# Patient Record
Sex: MALE | Race: BLACK OR AFRICAN AMERICAN | NOT HISPANIC OR LATINO | Employment: OTHER | URBAN - METROPOLITAN AREA
[De-identification: names, ages, dates, MRNs, and addresses within clinical notes are randomized per-mention and may not be internally consistent; named-entity substitution may affect disease eponyms.]

---

## 2017-01-06 ENCOUNTER — CARE COORDINATION (OUTPATIENT)
Dept: GERIATRIC MEDICINE | Facility: CLINIC | Age: 72
End: 2017-01-06

## 2017-01-06 NOTE — PROGRESS NOTES
TM left requesting a call back.  Patricia Isbell RN Case Manager  [facility]  [phone]

## 2017-01-09 ENCOUNTER — CARE COORDINATION (OUTPATIENT)
Dept: GERIATRIC MEDICINE | Facility: CLINIC | Age: 72
End: 2017-01-09

## 2017-01-09 NOTE — PROGRESS NOTES
[facility] Six-Month Telephone Assessment    6 month telephone assessment completed on January 9, 2017.    ER visits: No  Hospitalizations: No  TCU stays: No  Significant health status changes: none  Falls/Injuries: No  ADL/IADL changes: No  Changes in services: No    Caregiver Assessment follow up:  NA    Goals: See POC in chart for goal progress documentation.  Client stated that he got a couple teeth pulled, but he is doing well. No needs at this time.    Will see client in 6 months for an annual health risk assessment.   Encouraged client to call CM with any questions or concerns in the meantime.     Patricia Isbell RN Case Manager  [facility]  [phone]

## 2017-03-31 ENCOUNTER — TELEPHONE (OUTPATIENT)
Dept: FAMILY MEDICINE | Facility: CLINIC | Age: 72
End: 2017-03-31

## 2017-03-31 ENCOUNTER — OFFICE VISIT (OUTPATIENT)
Dept: FAMILY MEDICINE | Facility: CLINIC | Age: 72
End: 2017-03-31
Payer: COMMERCIAL

## 2017-03-31 DIAGNOSIS — Z13.6 SCREENING FOR AAA (ABDOMINAL AORTIC ANEURYSM): ICD-10-CM

## 2017-03-31 DIAGNOSIS — Z13.89 SCREENING FOR DIABETIC PERIPHERAL NEUROPATHY: ICD-10-CM

## 2017-03-31 DIAGNOSIS — Z00.01 ENCOUNTER FOR GENERAL ADULT MEDICAL EXAMINATION WITH ABNORMAL FINDINGS: Primary | ICD-10-CM

## 2017-03-31 DIAGNOSIS — K05.10 GINGIVITIS: ICD-10-CM

## 2017-03-31 DIAGNOSIS — E78.5 HYPERLIPIDEMIA WITH TARGET LDL LESS THAN 100: ICD-10-CM

## 2017-03-31 DIAGNOSIS — I10 ESSENTIAL HYPERTENSION WITH GOAL BLOOD PRESSURE LESS THAN 140/90: ICD-10-CM

## 2017-03-31 DIAGNOSIS — E11.65 TYPE 2 DIABETES MELLITUS WITH HYPERGLYCEMIA, WITHOUT LONG-TERM CURRENT USE OF INSULIN (H): ICD-10-CM

## 2017-03-31 DIAGNOSIS — E11.9 TYPE 2 DIABETES MELLITUS WITHOUT COMPLICATION, WITHOUT LONG-TERM CURRENT USE OF INSULIN (H): ICD-10-CM

## 2017-03-31 DIAGNOSIS — I10 HYPERTENSION GOAL BP (BLOOD PRESSURE) < 140/90: ICD-10-CM

## 2017-03-31 DIAGNOSIS — Z12.5 SCREENING FOR PROSTATE CANCER: ICD-10-CM

## 2017-03-31 DIAGNOSIS — Z87.891 HISTORY OF SMOKING: ICD-10-CM

## 2017-03-31 DIAGNOSIS — Z12.11 SCREEN FOR COLON CANCER: ICD-10-CM

## 2017-03-31 DIAGNOSIS — N40.1 BENIGN NON-NODULAR PROSTATIC HYPERPLASIA WITH LOWER URINARY TRACT SYMPTOMS: ICD-10-CM

## 2017-03-31 DIAGNOSIS — N32.81 OVERACTIVE BLADDER: ICD-10-CM

## 2017-03-31 DIAGNOSIS — Z23 NEED FOR PROPHYLACTIC VACCINATION WITH TETANUS-DIPHTHERIA (TD): ICD-10-CM

## 2017-03-31 LAB — HBA1C MFR BLD: 13.4 % (ref 4.3–6)

## 2017-03-31 PROCEDURE — 80061 LIPID PANEL: CPT | Performed by: FAMILY MEDICINE

## 2017-03-31 PROCEDURE — 84450 TRANSFERASE (AST) (SGOT): CPT | Performed by: FAMILY MEDICINE

## 2017-03-31 PROCEDURE — 99213 OFFICE O/P EST LOW 20 MIN: CPT | Mod: 25 | Performed by: FAMILY MEDICINE

## 2017-03-31 PROCEDURE — 36415 COLL VENOUS BLD VENIPUNCTURE: CPT | Performed by: FAMILY MEDICINE

## 2017-03-31 PROCEDURE — G0103 PSA SCREENING: HCPCS | Performed by: FAMILY MEDICINE

## 2017-03-31 PROCEDURE — 83036 HEMOGLOBIN GLYCOSYLATED A1C: CPT | Performed by: FAMILY MEDICINE

## 2017-03-31 PROCEDURE — 99397 PER PM REEVAL EST PAT 65+ YR: CPT | Performed by: FAMILY MEDICINE

## 2017-03-31 PROCEDURE — 84460 ALANINE AMINO (ALT) (SGPT): CPT | Performed by: FAMILY MEDICINE

## 2017-03-31 PROCEDURE — 99207 C FOOT EXAM  NO CHARGE: CPT | Mod: 25 | Performed by: FAMILY MEDICINE

## 2017-03-31 PROCEDURE — 80048 BASIC METABOLIC PNL TOTAL CA: CPT | Performed by: FAMILY MEDICINE

## 2017-03-31 RX ORDER — DOXYCYCLINE HYCLATE 100 MG
100 TABLET ORAL 2 TIMES DAILY
Qty: 20 TABLET | Refills: 0 | Status: SHIPPED | OUTPATIENT
Start: 2017-03-31 | End: 2017-04-07

## 2017-03-31 RX ORDER — PIOGLITAZONEHYDROCHLORIDE 30 MG/1
30 TABLET ORAL DAILY
Qty: 90 TABLET | Refills: 3 | Status: SHIPPED | OUTPATIENT
Start: 2017-03-31 | End: 2017-08-22

## 2017-03-31 NOTE — LETTER
[facility]   [address] [place]   [address]  [phone]    April 3, 2017      David Gutierrez .  [address] [place] [address]              Dear Mr. Gutierrez,    The blood sugar was EXTREMELY high and in a very dangerous range for heart attacks, stroke and kidney failure.      I hope the new medication (Actos) is going well.      I'll have my team call you to schedule follow up in 3-4 weeks since it doesn't look like you scheduled that at our appointment.     Results for orders placed or performed in visit on 03/31/17   BASIC METABOLIC PANEL   Result Value Ref Range    Sodium 138 133 - 144 mmol/L    Potassium 3.8 3.4 - 5.3 mmol/L    Chloride 100 94 - 109 mmol/L    Carbon Dioxide 26 20 - 32 mmol/L    Anion Gap 12 3 - 14 mmol/L    Glucose 274 (H) 70 - 99 mg/dL    Urea Nitrogen 25 7 - 30 mg/dL    Creatinine 1.17 0.66 - 1.25 mg/dL    GFR Estimate 62 >60 mL/min/1.7m2    GFR Estimate If Black 75 >60 mL/min/1.7m2    Calcium 9.3 8.5 - 10.1 mg/dL   HEMOGLOBIN A1C   Result Value Ref Range    Hemoglobin A1C 13.4 (H) 4.3 - 6.0 %   Lipid panel reflex to direct LDL   Result Value Ref Range    Cholesterol 144 <200 mg/dL    Triglycerides 115 <150 mg/dL    HDL Cholesterol 34 (L) >39 mg/dL    LDL Cholesterol Calculated 87 <100 mg/dL    Non HDL Cholesterol 110 <130 mg/dL   ALT   Result Value Ref Range    ALT 20 0 - 70 U/L   AST   Result Value Ref Range    AST 7 0 - 45 U/L   Prostate spec antigen screen   Result Value Ref Range    PSA 3.88 0 - 4 ug/L           Sincerely,    Joel Wegener, MD/nr

## 2017-03-31 NOTE — PATIENT INSTRUCTIONS
ASSESSMENT AND PLAN  1. Encounter for general adult medical examination with abnormal findings  Recommend:  tdap today, aortic ultrasound screening, lab monitoring for diabetes/ckd, fit test for colon cancer screening.     Generally not interested in increasing metformin or other diabetes medications although a1c generally high.      Up to date on pneumonia vaccines.     2. Hypertension goal BP (blood pressure) < 140/90  Re-check today.   - BASIC METABOLIC PANEL    3. Type 2 diabetes mellitus with hyperglycemia, without long-term current use of insulin (H)    - HEMOGLOBIN A1C    4. Hyperlipidemia with target LDL less than 100    - Lipid panel reflex to direct LDL  - ALT  - AST    5. Screen for colon cancer    - Fecal colorectal cancer screen FIT - Future (S+30); Future    6. Screening for diabetic peripheral neuropathy    - FOOT EXAM  NO CHARGE [08953.204]    7. Need for prophylactic vaccination with tetanus-diphtheria (TD)    - TDAP VACCINE (BOOSTRIX)    8. Screening for AAA (abdominal aortic aneurysm)    - AORTIC CENTER NOTIFICATION  - US Abdominal Aorta Limited; Future    9. History of smoking    - US Abdominal Aorta Limited; Future        MYCHART SIGNUP FOR E-VISITS AND EASIER COMMUNICATION:  [email or web address]     Zipnosis:  [employer].  Sign up for e-visits for common illnesses!     RADIOLOGY:   [facility]:  [phone] to schedule any radiology tests at [facility]: [phone]    Mammograms/colonoscopies:  [phone]    CONSUMER PRICE LINE for estimates of test costs:  [phone]       Preventive Health Recommendations:       Male Ages 65 and over    Yearly exam:             See your health care provider every year in order to  o   Review health changes.   o   Discuss preventive care.    o   Review your medicines if your doctor has prescribed any.    Talk with your health care provider about whether you should have a test to screen for prostate cancer  (PSA).    Every 3 years, have a diabetes test (fasting glucose). If you are at risk for diabetes, you should have this test more often.    Every 5 years, have a cholesterol test. Have this test more often if you are at risk for high cholesterol or heart disease.     Every 10 years, have a colonoscopy. Or, have a yearly FIT test (stool test). These exams will check for colon cancer.    Talk to with your health care provider about screening for Abdominal Aortic Aneurysm if you have a family history of AAA or have a history of smoking.  Shots:     Get a flu shot each year.     Get a tetanus shot every 10 years.     Talk to your doctor about your pneumonia vaccines. There are now two you should receive - Pneumovax (PPSV 23) and Prevnar (PCV 13).    Talk to your doctor about a shingles vaccine.     Talk to your doctor about the hepatitis B vaccine.  Nutrition:     Eat at least 5 servings of fruits and vegetables each day.     Eat whole-grain bread, whole-wheat pasta and brown rice instead of white grains and rice.     Talk to your doctor about Calcium and Vitamin D.   Lifestyle    Exercise for at least 150 minutes a week (30 minutes a day, 5 days a week). This will help you control your weight and prevent disease.     Limit alcohol to one drink per day.     No smoking.     Wear sunscreen to prevent skin cancer.     See your dentist every six months for an exam and cleaning.     See your eye doctor every 1 to 2 years to screen for conditions such as glaucoma, macular degeneration and cataracts.

## 2017-03-31 NOTE — NURSING NOTE
Chief Complaint   Patient presents with     Physical       Initial /70 (BP Location: Left arm, Patient Position: Chair, Cuff Size: Adult Regular)  Pulse 107  Temp 98.8  F (37.1  C) (Oral)  Ht 6' (1.829 m)  Wt 196 lb 8 oz (89.1 kg)  SpO2 96%  BMI 26.65 kg/m2 Estimated body mass index is 26.65 kg/(m^2) as calculated from the following:    Height as of this encounter: 6' (1.829 m).    Weight as of this encounter: 196 lb 8 oz (89.1 kg).  Medication Reconciliation: complete Jassi Winston MA

## 2017-03-31 NOTE — PROGRESS NOTES
"  SUBJECTIVE:                                                            David Gutierrez Jr. is a 71 year old male who presents for Preventive Visit.      Are you in the first 12 months of your Medicare Part B coverage?  No    Healthy Habits:Answers for HPI/ROS submitted by the patient on 3/31/2017   Annual Exam:  Getting at least 3 servings of Calcium per day:: NO  Bi-annual eye exam:: Yes  Dental care twice a year:: Yes  Sleep apnea or symptoms of sleep apnea:: None  Diet:: Regular (no restrictions)  Taking medications regularly:: Not Applicable  Medication side effects:: None  Additional concerns today:: YES  Q1: Little interest or pleasure in doing things: 0=Not at all  Q2: Feeling down, depressed or hopeless: 0=Not at all  PHQ-2 Score: 0    COGNITIVE SCREEN  1) Repeat 3 items (Banana, Sunrise, Chair)      2) Clock draw: ABNORMAL   3) 3 item recall: Recalls 1 object   Results: ABNORMAL clock, 1-2 items recalled: PROBABLE COGNITIVE IMPAIRMENT, **INFORM PROVIDER**    Mini-CogTM Copyright GAYLE Aleman. Licensed by the author for use in [facility]; reprinted with permission ([email or web address]). All rights reserved.          Pt want medication refills    Blood sugars reported at about 300.  Checks daily.  Thinks that is \"better than used to be.\"  When confronted with years of high blood sugars states would like to use a daily \"supplement\" he has seen on TV to get them down.  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  Tries to keep carbs low but doesn't always.  Endorses taking metformin 500mg twice daily but afraid of taking higher due to hypoglycemia.  Med list reviewed and endorses taking the blood pressue meds, stating and ace inhibitor.  Does remember that he is due for a colonoscopy on his own and asks for the scheduling number for this.     No vision loss, no numbness. " Does endorse seeing and eye doctor yearly at least.     Reviewed and updated as needed this visit by clinical staff  Tobacco  Allergies  Meds  Med Hx  Surg Hx  Fam Hx  Soc Hx        Reviewed and updated as needed this visit by Provider  Tobacco        Social History   Substance Use Topics     Smoking status: Former Smoker     Quit date: 1/1/1992     Smokeless tobacco: Never Used      Comment: social     Alcohol use Yes      Comment: rare       The patient does not drink >3 drinks per day nor >7 drinks per week.    Today's PHQ-2 Score:   PHQ-2 ( 1999 Pfizer) 3/31/2017 7/26/2016   Q1: Little interest or pleasure in doing things 0 0   Q2: Feeling down, depressed or hopeless 0 0   PHQ-2 Score 0 0   Little interest or pleasure in doing things Not at all -   Feeling down, depressed or hopeless Not at all -   PHQ-2 Score 0 -       Do you feel safe in your environment - Yes    Do you have a Health Care Directive?: No: Advance care planning reviewed with patient; information given to patient to review.             Current providers sharing in care for this patient include:   Patient Care Team:  Wegener, Joel Daniel Irwin, MD as PCP - General (Family Practice)  Patricia Isbell, RN as   Sita Hernandez as Other (see comments)  Edel Durand as Other (see comments)      Hearing impairment: No    Ability to successfully perform activities of daily living: Yes, no assistance needed     Fall risk:  Fallen 2 or more times in the past year?: Yes  Any fall with injury in the past year?: No    Home safety:  none identified  click delete button to remove this line now    The following health maintenance items are reviewed in Epic and correct as of today:  Health Maintenance   Topic Date Due     COLONOSCOPY Q10 YEARS NO INBASKET MESSAGE  06/05/1955     EYE EXAM Q1 YEAR( NO INBASKET)  06/12/2016     PNEUMOCOCCAL (2 of 2 - PPSV23) 11/10/2016     TETANUS IMMUNIZATION (SYSTEM ASSIGNED)  01/09/2017     ADVANCE  DIRECTIVE PLANNING Q5 YRS (NO INBASKET)  03/23/2017     A1C Q3 MO( NO INBASKET)  06/30/2017     MICROALBUMIN Q1 YEAR( NO INBASKET)  07/29/2017     FIT Q1 YR (NO INBASKET)  07/29/2017     INFLUENZA VACCINE (SYSTEM ASSIGNED)  09/01/2017     BMP Q6 MOS (NO INBASKET)  09/30/2017     LIPID MONITORING Q6 MO( NO INBASKET)  09/30/2017     FOOT EXAM Q1 YEAR( NO INBASKET)  03/31/2018     FALL RISK ASSESSMENT  03/31/2018     TSH W/ FREE T4 REFLEX Q2 YEAR (NO INBASKET)  07/29/2018     AORTIC ANEURYSM SCREENING (SYSTEM ASSIGNED)  Completed     HEPATITIS C SCREENING  Completed           ROS:  Constitutional, HEENT, cardiovascular, pulmonary, GI, , musculoskeletal, neuro, skin, endocrine and psych systems are negative, except as otherwise noted.    Problem list, Medication list, Allergies, and Medical/Social/Surgical histories reviewed in [facility] and updated as appropriate.  OBJECTIVE:                                                            /74  Pulse 107  Temp 98.8  F (37.1  C) (Oral)  Ht 6' (1.829 m)  Wt 196 lb 8 oz (89.1 kg)  SpO2 96%  BMI 26.65 kg/m2 Estimated body mass index is 26.65 kg/(m^2) as calculated from the following:    Height as of this encounter: 6' (1.829 m).    Weight as of this encounter: 196 lb 8 oz (89.1 kg).  EXAM:   GENERAL: healthy, alert and no distress  EYES: Eyes grossly normal to inspection, PERRL and conjunctivae and sclerae normal  HENT: ear canals and TM's normal, nose and mouth without ulcers or lesions  NECK: no adenopathy, no asymmetry, masses, or scars and thyroid normal to palpation  RESP: lungs clear to auscultation - no rales, rhonchi or wheezes  CV: regular rate and rhythm, normal S1 S2, no S3 or S4, no murmur, click or rub, no peripheral edema and peripheral pulses strong  ABDOMEN: soft, nontender, no hepatosplenomegaly, no masses and bowel sounds normal   (male): normal male genitalia without lesions or urethral discharge, no hernia  RECTAL: normal sphincter tone, no rectal  "masses, prostate fairly enlarged but no nodules or masses  MS: no gross musculoskeletal defects noted, no edema  SKIN: no suspicious lesions or rashes  NEURO: Normal strength and tone, mentation intact and speech normal  PSYCH: mentation appears normal, affect normal/bright    Bilateral diabetic monofilament foot exam normal     ASSESSMENT / PLAN:                                                            ASSESSMENT AND PLAN  1. Encounter for general adult medical examination with abnormal findings  Recommend:  tdap today, aortic ultrasound screening, lab monitoring for diabetes/ckd, fit test for colon cancer screening.     Generally not interested in increasing metformin or other diabetes medications although a1c generally high.      Long discussion - more than 1/2 of 25 additional minutes) spent discussing the imminent risks of his prolonged hyperglycemia including heart attack, stroke, neuropathy, kidney failure, dka.  He is not very convinced to change medications or to increase the metformin dose especially since that would be the maximum dose.  Interested in a daily \"supplement\" medication  Given that we discussed adding actos 30mg daily.  Discussed some increased heart risk with this med inherently but overall lower risk if this reduces blood sugar.  I think this would be a good addition to the metformin as it is often covered since it is generic now and is only once a day.  Of all diabetes medications it also has some possibility of being \"disease  Modifying\" meaning unlike others it could have the potential to get him to goal even with just this addition.  Started 30mg after reviewing dosing range of 15-30mg.  He is ok with this since it is not the \"highest\" dose.      I have not seen formal diagnosis in his chart but given his perseverative and slightly odd history about his family and his preoccupation with supplements, personal and family achievements I suspect he has a form of asperger's syndrome or " similar. Adjusting therapy may take a long time and patience for insight.     Up to date on pneumonia vaccines.     2. Hypertension goal BP (blood pressure) < 140/90  Re-check today.   - BASIC METABOLIC PANEL    3. Type 2 diabetes mellitus with hyperglycemia, without long-term current use of insulin (H)    - HEMOGLOBIN A1C    4. Hyperlipidemia with target LDL less than 100    - Lipid panel reflex to direct LDL  - ALT  - AST    5. Screen for colon cancer    - Fecal colorectal cancer screen FIT - Future (S+30); Future    6. Screening for diabetic peripheral neuropathy    - FOOT EXAM  NO CHARGE [34521.114]    7. Need for prophylactic vaccination with tetanus-diphtheria (TD)    - TDAP VACCINE (BOOSTRIX)    8. Screening for AAA (abdominal aortic aneurysm)    - AORTIC CENTER NOTIFICATION  -  Abdominal Aorta Limited; Future    9. History of smoking    -  Abdominal Aorta Limited; Future          End of Life Planning:  Patient currently has an advanced directive: No.  I have verified the patient's ablity to prepare an advanced directive/make health care decisions.  Literature was provided to assist patient in preparing an advanced directive.    COUNSELING:  Reviewed preventive health counseling, as reflected in patient instructions       Regular exercise       Healthy diet/nutrition       Colon cancer screening       Prostate cancer screening        Estimated body mass index is 26.65 kg/(m^2) as calculated from the following:    Height as of this encounter: 6' (1.829 m).    Weight as of this encounter: 196 lb 8 oz (89.1 kg).  Weight management plan: Discussed healthy diet and exercise guidelines and patient will follow up in 1 month in clinic to re-evaluate.   reports that he quit smoking about 25 years ago. He has never used smokeless tobacco.      Appropriate preventive services were discussed with this patient, including applicable screening as appropriate for cardiovascular disease, diabetes, osteopenia/osteoporosis,  and glaucoma.  As appropriate for age/gender, discussed screening for colorectal cancer, prostate cancer, breast cancer, and cervical cancer. Checklist reviewing preventive services available has been given to the patient.    Reviewed patients plan of care and provided an AVS. The Intermediate Care Plan ( asthma action plan, low back pain action plan, and migraine action plan) for David meets the Care Plan requirement. This Care Plan has been established and reviewed with the Patient.    Counseling Resources:  ATP IV Guidelines  Pooled Cohorts Equation Calculator  Breast Cancer Risk Calculator  FRAX Risk Assessment  ICSI Preventive Guidelines  Dietary Guidelines for Americans, 2010  USDA's MyPlate  ASA Prophylaxis  Lung CA Screening    Joel Daniel Wegener, MD  [facility]

## 2017-03-31 NOTE — MR AVS SNAPSHOT
After Visit Summary   3/31/2017    David Gutierrez Jr.    MRN: [id number]           Patient Information     Date Of Birth          1945        Visit Information        Provider Department      3/31/2017 11:20 AM Wegener, Joel Daniel Irwin, MD [facility]        Today's Diagnoses     Encounter for general adult medical examination with abnormal findings    -  1    Hypertension goal BP (blood pressure) < 140/90        Type 2 diabetes mellitus with hyperglycemia, without long-term current use of insulin (H)        Hyperlipidemia with target LDL less than 100        Screen for colon cancer        Screening for diabetic peripheral neuropathy        Need for prophylactic vaccination with tetanus-diphtheria (TD)        Screening for AAA (abdominal aortic aneurysm)        History of smoking          Care Instructions    ASSESSMENT AND PLAN  1. Encounter for general adult medical examination with abnormal findings  Recommend:  tdap today, aortic ultrasound screening, lab monitoring for diabetes/ckd, fit test for colon cancer screening.     Generally not interested in increasing metformin or other diabetes medications although a1c generally high.      Up to date on pneumonia vaccines.     2. Hypertension goal BP (blood pressure) < 140/90  Re-check today.   - BASIC METABOLIC PANEL    3. Type 2 diabetes mellitus with hyperglycemia, without long-term current use of insulin (H)    - HEMOGLOBIN A1C    4. Hyperlipidemia with target LDL less than 100    - Lipid panel reflex to direct LDL  - ALT  - AST    5. Screen for colon cancer    - Fecal colorectal cancer screen FIT - Future (S+30); Future    6. Screening for diabetic peripheral neuropathy    - FOOT EXAM  NO CHARGE [90702.114]    7. Need for prophylactic vaccination with tetanus-diphtheria (TD)    - TDAP VACCINE (BOOSTRIX)    8. Screening for AAA (abdominal aortic aneurysm)    - AORTIC CENTER NOTIFICATION  -  Abdominal Aorta Limited;  Future    9. History of smoking    - US Abdominal Aorta Limited; Future        MYCHART SIGNUP FOR E-VISITS AND EASIER COMMUNICATION:  [email or web address]     Zipnosis:  [email or web address].  Sign up for e-visits for common illnesses!     RADIOLOGY:   [facility]:  [phone] to schedule any radiology tests at [facility]: [phone]    Mammograms/colonoscopies:  [phone]    CONSUMER PRICE LINE for estimates of test costs:  [phone]       Preventive Health Recommendations:       Male Ages 65 and over    Yearly exam:             See your health care provider every year in order to  o   Review health changes.   o   Discuss preventive care.    o   Review your medicines if your doctor has prescribed any.    Talk with your health care provider about whether you should have a test to screen for prostate cancer (PSA).    Every 3 years, have a diabetes test (fasting glucose). If you are at risk for diabetes, you should have this test more often.    Every 5 years, have a cholesterol test. Have this test more often if you are at risk for high cholesterol or heart disease.     Every 10 years, have a colonoscopy. Or, have a yearly FIT test (stool test). These exams will check for colon cancer.    Talk to with your health care provider about screening for Abdominal Aortic Aneurysm if you have a family history of AAA or have a history of smoking.  Shots:     Get a flu shot each year.     Get a tetanus shot every 10 years.     Talk to your doctor about your pneumonia vaccines. There are now two you should receive - Pneumovax (PPSV 23) and Prevnar (PCV 13).    Talk to your doctor about a shingles vaccine.     Talk to your doctor about the hepatitis B vaccine.  Nutrition:     Eat at least 5 servings of fruits and vegetables each day.     Eat whole-grain bread, whole-wheat pasta and brown rice instead of white grains and rice.     Talk to your doctor about Calcium and Vitamin D.  "  Lifestyle    Exercise for at least 150 minutes a week (30 minutes a day, 5 days a week). This will help you control your weight and prevent disease.     Limit alcohol to one drink per day.     No smoking.     Wear sunscreen to prevent skin cancer.     See your dentist every six months for an exam and cleaning.     See your eye doctor every 1 to 2 years to screen for conditions such as glaucoma, macular degeneration and cataracts.        Follow-ups after your visit        Future tests that were ordered for you today     Open Future Orders        Priority Expected Expires Ordered    Fecal colorectal cancer screen FIT - Future (S+30) Routine 4/21/2017 4/30/2017 3/31/2017     Abdominal Aorta Limited Routine  3/31/2018 3/31/2017            Who to contact     If you have questions or need follow up information about today's clinic visit or your schedule please contact [facility] directly at [phone].  Normal or non-critical lab and imaging results will be communicated to you by Citycelebrityhart, letter or phone within 4 business days after the clinic has received the results. If you do not hear from us within 7 days, please contact the clinic through Citycelebrityhart or phone. If you have a critical or abnormal lab result, we will notify you by phone as soon as possible.  Submit refill requests through [email or web address] or call your pharmacy and they will forward the refill request to us. Please allow 3 business days for your refill to be completed.          Additional Information About Your Visit        [employer] Information     [email or web address] lets you send messages to your doctor, view your test results, renew your prescriptions, schedule appointments and more. To sign up, go to [email or web address] . Click on \"Log in\" on the left side of the screen, which will take you to the Welcome page. Then click on \"Sign up Now\" on the right side of the page.     You will be asked to enter the access code listed below, as well as some " personal information. Please follow the directions to create your username and password.     Your access code is: [email or web address]  Expires: 2017 12:32 PM     Your access code will  in 90 days. If you need help or a new code, please call your [place] clinic or [phone].        Care EveryWhere ID     This is your Care EveryWhere ID. This could be used by other organizations to access your [place] medical records  [id number]        Your Vitals Were     Pulse Temperature Height Pulse Oximetry BMI (Body Mass Index)       107 98.8  F (37.1  C) (Oral) 6' (1.829 m) 96% 26.65 kg/m2        Blood Pressure from Last 3 Encounters:   17 140/70   16 124/72   11/10/15 118/68    Weight from Last 3 Encounters:   17 196 lb 8 oz (89.1 kg)   16 188 lb (85.3 kg)   11/10/15 196 lb (88.9 kg)              We Performed the Following     ALT     AORTIC CENTER NOTIFICATION     AST     BASIC METABOLIC PANEL     FOOT EXAM  NO CHARGE [73402.114]     HEMOGLOBIN A1C     Lipid panel reflex to direct LDL     TDAP VACCINE (BOOSTRIX)        Primary Care Provider Office Phone # Fax #    Joel Daniel Irwin Wegener, [phone]       [facility] [address] [place] [id number]        Thank you!     Thank you for choosing [facility]  for your care. Our goal is always to provide you with excellent care. Hearing back from our patients is one way we can continue to improve our services. Please take a few minutes to complete the written survey that you may receive in the mail after your visit with us. Thank you!             Your Updated Medication List - Protect others around you: Learn how to safely use, store and throw away your medicines at www.disposemymeds.org.          This list is accurate as of: 3/31/17 12:32 PM.  Always use your most recent med list.                   Brand Name Dispense Instructions for use    aspirin 81 MG tablet     100 tablet    Take 1 tablet (81 mg)  by mouth daily       blood glucose monitor Kit     1 kit    1 each daily.       blood glucose monitoring test strip    no brand specified    3 Box    Use twice daily before meals and as needed for symptoms of low blood sugar.       DAILY MULTIVITAMIN PO      Take 1 tablet by mouth.       DOXYCYCLINE PO      Take 20 mg by mouth       GARLIC      1 po qd       lisinopril 20 MG tablet    PRINIVIL/ZESTRIL    90 tablet    Take 1 tablet (20 mg) by mouth daily       metFORMIN 500 MG tablet    GLUCOPHAGE    180 tablet    Take 1 tablet (500 mg) by mouth 2 times daily (with meals)       Milk Thistle 500 MG Caps      Take  by mouth every 48 hours.       oxybutynin 5 MG 24 hr tablet    DITROPAN XL    90 tablet    Take 1 tablet (5 mg) by mouth daily       sildenafil 50 MG cap/tab    REVATIO/VIAGRA    12 tablet    Take 0.5-1 tablets (25-50 mg) by mouth daily as needed for erectile dysfunction Take 30 min to 4 hours before intercourse.  Never use with nitroglycerin, terazosin or doxazosin.       simvastatin 20 MG tablet    ZOCOR    90 tablet    Take 1 tablet (20 mg) by mouth At Bedtime       tamsulosin 0.4 MG capsule    FLOMAX    90 capsule    Take 1 capsule (0.4 mg) by mouth daily       thin lancets    NO BRAND SPECIFIED    180 each    1 Device 2 times daily (before meals)       triamterene-hydrochlorothiazide 37.5-25 MG per capsule    DYAZIDE    90 capsule    Take 1 capsule by mouth daily       verapamil 180 MG Cp24 24 hr capsule    VERELAN    90 capsule    Take 1 capsule (180 mg) by mouth daily

## 2017-03-31 NOTE — TELEPHONE ENCOUNTER
PT stopped at the desk after appt with pcp.  Pt was wondering about refill on this med-  DOXYCYCLINE PO     --      Sig: Take 20 mg by mouth       This is listed as hx.  RAHEL Pimentel

## 2017-04-01 LAB
ALT SERPL W P-5'-P-CCNC: 20 U/L (ref 0–70)
ANION GAP SERPL CALCULATED.3IONS-SCNC: 12 MMOL/L (ref 3–14)
AST SERPL W P-5'-P-CCNC: 7 U/L (ref 0–45)
BUN SERPL-MCNC: 25 MG/DL (ref 7–30)
CALCIUM SERPL-MCNC: 9.3 MG/DL (ref 8.5–10.1)
CHLORIDE SERPL-SCNC: 100 MMOL/L (ref 94–109)
CHOLEST SERPL-MCNC: 144 MG/DL
CO2 SERPL-SCNC: 26 MMOL/L (ref 20–32)
CREAT SERPL-MCNC: 1.17 MG/DL (ref 0.66–1.25)
GFR SERPL CREATININE-BSD FRML MDRD: 62 ML/MIN/1.7M2
GLUCOSE SERPL-MCNC: 274 MG/DL (ref 70–99)
HDLC SERPL-MCNC: 34 MG/DL
LDLC SERPL CALC-MCNC: 87 MG/DL
NONHDLC SERPL-MCNC: 110 MG/DL
POTASSIUM SERPL-SCNC: 3.8 MMOL/L (ref 3.4–5.3)
PSA SERPL-ACNC: 3.88 UG/L (ref 0–4)
SODIUM SERPL-SCNC: 138 MMOL/L (ref 133–144)
TRIGL SERPL-MCNC: 115 MG/DL

## 2017-04-02 ENCOUNTER — TELEPHONE (OUTPATIENT)
Dept: FAMILY MEDICINE | Facility: CLINIC | Age: 72
End: 2017-04-02

## 2017-04-02 VITALS
HEIGHT: 72 IN | DIASTOLIC BLOOD PRESSURE: 74 MMHG | TEMPERATURE: 98.8 F | HEART RATE: 107 BPM | SYSTOLIC BLOOD PRESSURE: 135 MMHG | WEIGHT: 196.5 LBS | OXYGEN SATURATION: 96 % | BODY MASS INDEX: 26.61 KG/M2

## 2017-04-02 RX ORDER — TAMSULOSIN HYDROCHLORIDE 0.4 MG/1
0.4 CAPSULE ORAL DAILY
Qty: 90 CAPSULE | Refills: 3 | Status: SHIPPED | OUTPATIENT
Start: 2017-04-02 | End: 2017-12-04

## 2017-04-02 RX ORDER — OXYBUTYNIN CHLORIDE 5 MG/1
5 TABLET, EXTENDED RELEASE ORAL DAILY
Qty: 90 TABLET | Refills: 3 | Status: SHIPPED | OUTPATIENT
Start: 2017-04-02 | End: 2018-11-21

## 2017-04-02 RX ORDER — LISINOPRIL 20 MG/1
20 TABLET ORAL DAILY
Qty: 90 TABLET | Refills: 3 | Status: SHIPPED | OUTPATIENT
Start: 2017-04-02 | End: 2018-04-19

## 2017-04-02 RX ORDER — VERAPAMIL HYDROCHLORIDE 180 MG/1
180 CAPSULE, EXTENDED RELEASE ORAL DAILY
Qty: 90 CAPSULE | Refills: 3 | Status: SHIPPED | OUTPATIENT
Start: 2017-04-02 | End: 2018-06-12

## 2017-04-02 RX ORDER — SIMVASTATIN 20 MG
20 TABLET ORAL AT BEDTIME
Qty: 90 TABLET | Refills: 3 | Status: SHIPPED | OUTPATIENT
Start: 2017-04-02 | End: 2018-08-17

## 2017-04-02 RX ORDER — TRIAMTERENE AND HYDROCHLOROTHIAZIDE 37.5; 25 MG/1; MG/1
1 CAPSULE ORAL DAILY
Qty: 90 CAPSULE | Refills: 3 | Status: SHIPPED | OUTPATIENT
Start: 2017-04-02 | End: 2019-04-01

## 2017-04-02 NOTE — TELEPHONE ENCOUNTER
Call pt re:    Mr. Gutierrez,     The blood sugar was EXTREMELY high and in a very dangerous range for heart attacks, stroke and kidney failure.      I hope the new medication (Actos) is going well.      I'll have my team call you to schedule follow up in 3-4 weeks since it doesn't look like you scheduled that at our appointment.     Best,     Joel Wegener,MD

## 2017-04-03 ENCOUNTER — DOCUMENTATION ONLY (OUTPATIENT)
Dept: VASCULAR SURGERY | Facility: CLINIC | Age: 72
End: 2017-04-03

## 2017-04-05 ENCOUNTER — TELEPHONE (OUTPATIENT)
Dept: FAMILY MEDICINE | Facility: CLINIC | Age: 72
End: 2017-04-05

## 2017-04-06 ENCOUNTER — TELEPHONE (OUTPATIENT)
Dept: FAMILY MEDICINE | Facility: CLINIC | Age: 72
End: 2017-04-06

## 2017-04-06 ENCOUNTER — TELEPHONE (OUTPATIENT)
Dept: PHARMACY | Facility: OTHER | Age: 72
End: 2017-04-06

## 2017-04-06 DIAGNOSIS — K05.10 GINGIVITIS: ICD-10-CM

## 2017-04-06 DIAGNOSIS — E11.65 TYPE 2 DIABETES MELLITUS WITH HYPERGLYCEMIA, WITHOUT LONG-TERM CURRENT USE OF INSULIN (H): Primary | ICD-10-CM

## 2017-04-06 NOTE — TELEPHONE ENCOUNTER
LVM requesting patient call clinic back to discuss Dr. Wegener's message below.    Thanks! Nicolle Mccann RN

## 2017-04-06 NOTE — TELEPHONE ENCOUNTER
MTM referral from: [facility]     MTM referral outreach attempt #1 on April 6, 2017 at 12:50 PM      Outcome: Patient is not interested at this time because he feels its not necessary, will route to MTM Pharmacist/Provider as an FYI. Thank you for the referral.     Rose Marie Boo, MTM Coordinator Intern

## 2017-04-06 NOTE — TELEPHONE ENCOUNTER
Team,     Can you ask Mr. Gutierrez if he started the actos yet?      If he didn't I realized he may qualify for the GRADE study at the [facility] for diabetes care.     He would get free medication and diabetes visit with them.     Is this something he would be interested in?     Joel Wegener,MD

## 2017-04-07 RX ORDER — DOXYCYCLINE HYCLATE 20 MG
20 TABLET ORAL DAILY
Qty: 90 TABLET | Refills: 3 | Status: SHIPPED | OUTPATIENT
Start: 2017-04-07 | End: 2017-04-10

## 2017-04-07 NOTE — TELEPHONE ENCOUNTER
I changed prescription for doxycycline (uses chronically for gingivitis.)     Also Margarito gave him info on grade study which he is interested in.     Joel Wegener,MD        You may be eligible for the GRADE study at the [facility]  (Glycemia reduction approaches in Diabetes: A comparative effectiveness study).    The point of the study is to determine what is the best second drug to add to metformin in patients with type 2 diabetes.  Adullts with type 2 for less than 10 years who take metformin or no medication at all.  Study participants would come to the [facility] 4 times a year for seven years and receive FREE medications, lab tests, diabetes supplies.    Call , email [email or web address], or visit ZACK on faceboook at [email or web address]

## 2017-04-07 NOTE — TELEPHONE ENCOUNTER
Talked to pt.  The doxycycline needs to be fixed. The dose is 20 mg daily. Pended. Discussed with JW.        Pt is interested in the GRADE study.    LM for pt.   I told him to stop the actos. Per JW, Ok to stop if he was only on this for a few days.  Left pt the GRADE phone # [phone] to get registered with Diab study at [facility] [address] [place] [address].  Call clinic with any questions.  RAHEL Pimentel

## 2017-04-10 RX ORDER — DOXYCYCLINE HYCLATE 20 MG
20 TABLET ORAL 2 TIMES DAILY
Qty: 180 TABLET | Refills: 3 | Status: SHIPPED | OUTPATIENT
Start: 2017-04-10 | End: 2017-04-10

## 2017-04-10 RX ORDER — DOXYCYCLINE HYCLATE 20 MG
20 TABLET ORAL DAILY
Qty: 90 TABLET | Refills: 0 | Status: SHIPPED | OUTPATIENT
Start: 2017-04-10 | End: 2017-08-04

## 2017-04-10 NOTE — TELEPHONE ENCOUNTER
Reviewed this message with pt.  He will pu his rx for doxycycline.    I wanted to make sure he got the study info.  After further thought , he doesn't want to do the diab GRADE study.  He wants to continue on the 2 meds rx and hopefully this will handle the diabetes, per the pt.  RAHEL Pimentel

## 2017-04-10 NOTE — TELEPHONE ENCOUNTER
Received two messages today regarding doxycycline.  First message I felt was telling me he thought his previous dose had been 20mg twice daily.     In fact as he comes to get prescription filled clarifies it has been once daily for quite a long time.     Since gingivitis was well controlled/low at his visit with me I feel 20mg once daily is reasonable even though this is lower than recommended dosing guidelines.     I sent in new prescription for once daily.     Joel Wegener,MD

## 2017-04-12 PROCEDURE — 82274 ASSAY TEST FOR BLOOD FECAL: CPT | Performed by: FAMILY MEDICINE

## 2017-04-14 DIAGNOSIS — Z12.11 SCREEN FOR COLON CANCER: ICD-10-CM

## 2017-04-14 LAB — HEMOCCULT STL QL IA: NEGATIVE

## 2017-04-14 NOTE — LETTER
"[facility]   [address] [place] [address]  [phone]      April 17, 2017      David Gutierrez Jr.  [address] [place] [address]          Dear Mr. Gutierrez,    The results of your recent lab tests were within normal limits. Enclosed is a copy of these results.  If you have any further questions or problems, please contact our office.    Results for orders placed or performed in visit on 04/14/17   Fecal colorectal cancer screen FIT - Future (S+30)   Result Value Ref Range    Occult Blood Scn FIT Negative NEG         Sincerely,      Rox \"David\" Yovanny PAPrashanthC/nr        "

## 2017-04-14 NOTE — LETTER
[facility]   [address] [place]   [address]  [phone]    April 20, 2017      David Gutierrez Jr.  [address] [place] [address]              Dear Mr. Gutierrez,    Your results were normal.     Results for orders placed or performed in visit on 04/14/17   Fecal colorectal cancer screen FIT - Future (S+30)   Result Value Ref Range    Occult Blood Scn FIT Negative NEG           Sincerely,    Joel Wegener, MD/nr

## 2017-06-19 ENCOUNTER — CARE COORDINATION (OUTPATIENT)
Dept: GERIATRIC MEDICINE | Facility: CLINIC | Age: 72
End: 2017-06-19

## 2017-06-19 NOTE — PROGRESS NOTES
Attempted to contact client through his number and was told it was the wrong number. It is also for one of his emergency contacts. TM left for client's daughter Dana requesting a call back to get client's number.  Patricia Isbell RN Case Manager  [facility]  [phone]

## 2017-06-20 ENCOUNTER — CARE COORDINATION (OUTPATIENT)
Dept: GERIATRIC MEDICINE | Facility: CLINIC | Age: 72
End: 2017-06-20

## 2017-06-20 NOTE — PROGRESS NOTES
TC to client. Discussed a home visit. He stated he would think about it and get back with CM (CM doubts client will agree to a visit as he has always declined in the past and plans to send a refusal of visit to client). Reports he is doing well, no falls, ER visits or hospitalizations.  Patricia Isbell RN Case Manager  [facility]  [phone]

## 2017-06-21 ENCOUNTER — CARE COORDINATION (OUTPATIENT)
Dept: GERIATRIC MEDICINE | Facility: CLINIC | Age: 72
End: 2017-06-21

## 2017-06-21 NOTE — PROGRESS NOTES
"Per CC, mailed client a \"Refusal of Home Visit\" letter.  MMIS entry.    Lindsay Tavarez  Case Management Specialist  [facility]  [phone]          "

## 2017-08-02 DIAGNOSIS — K05.10 GINGIVITIS: ICD-10-CM

## 2017-08-03 RX ORDER — DOXYCYCLINE HYCLATE 100 MG
100 TABLET ORAL 2 TIMES DAILY
Qty: 30 TABLET | Refills: 0 | Status: SHIPPED | OUTPATIENT
Start: 2017-08-03 | End: 2017-08-04

## 2017-08-03 NOTE — TELEPHONE ENCOUNTER
Call pt.  High levels of no shows, last 4 visits no shows.     Needs to schedule with me for diabetes follow up.     Per current [facility] policy the clinic is preparing to dismiss him unless I provide a plan with him for improved reliability.     He has been a long-standing patient so I want to help with this but he does need to see me and keep the appointment within the next month.     Joel Wegener,MD

## 2017-08-03 NOTE — TELEPHONE ENCOUNTER
Team coordinators-Please contact patient, review message per Dr. Wegener and assist patient to schedule office visit.    Thank you!  ROSE MARIE MayorgaN, RN

## 2017-08-03 NOTE — TELEPHONE ENCOUNTER
Routing refill request to provider for review/approval because:  -Overdue for gingivitis and diabetes follow up.  -four no shows since last office visit on 3/31/17      Dr. Wegener-Please sign if agree. 15 day supply pended.    Team coordinators-please contact patient to schedule diabetes and gingivitis follow up visit.    Thank you!  ROSE MARIE MayorgaN, RN

## 2017-08-03 NOTE — TELEPHONE ENCOUNTER
doxycycline (PERIOSTAT) 20 MG tablet      Last Written Prescription Date: 4/10/17  Last Fill Quantity: 90,  # refills: 0   Last Office Visit with [facility], [facility] or [facility] prescribing provider: 7/21/17

## 2017-08-04 ENCOUNTER — TELEPHONE (OUTPATIENT)
Dept: FAMILY MEDICINE | Facility: CLINIC | Age: 72
End: 2017-08-04

## 2017-08-04 DIAGNOSIS — K05.10 GINGIVITIS: ICD-10-CM

## 2017-08-04 RX ORDER — DOXYCYCLINE HYCLATE 20 MG
20 TABLET ORAL DAILY
Qty: 30 TABLET | Refills: 0 | Status: SHIPPED | OUTPATIENT
Start: 2017-08-04 | End: 2023-07-14

## 2017-08-04 NOTE — TELEPHONE ENCOUNTER
I called pt and left message re:    No-shows:    1.  Needs to follow up with me for diabetes, etc or he will be released from our clinic and will need to find a new clinic.     2.  Doxycycline:  I will refill one more month of 20mg daily for gingivitis.     He will need to establish care with a dentist for further treatment since we are well beyond the normal time frame of use for this medication and I don't normally treat this condition.     If this is not the correct dose I asked him to please call me back and let me know.     Joel Wegener,MD

## 2017-08-22 ENCOUNTER — OFFICE VISIT (OUTPATIENT)
Dept: FAMILY MEDICINE | Facility: CLINIC | Age: 72
End: 2017-08-22
Payer: COMMERCIAL

## 2017-08-22 VITALS
SYSTOLIC BLOOD PRESSURE: 105 MMHG | WEIGHT: 195 LBS | OXYGEN SATURATION: 97 % | BODY MASS INDEX: 26.45 KG/M2 | HEART RATE: 102 BPM | DIASTOLIC BLOOD PRESSURE: 65 MMHG | TEMPERATURE: 96.6 F

## 2017-08-22 DIAGNOSIS — I10 HYPERTENSION GOAL BP (BLOOD PRESSURE) < 140/90: ICD-10-CM

## 2017-08-22 DIAGNOSIS — K05.10 CHRONIC GINGIVITIS: ICD-10-CM

## 2017-08-22 DIAGNOSIS — Z23 NEED FOR VACCINATION: ICD-10-CM

## 2017-08-22 DIAGNOSIS — L84 CALLUS OF FOOT: ICD-10-CM

## 2017-08-22 DIAGNOSIS — E11.65 TYPE 2 DIABETES MELLITUS WITH HYPERGLYCEMIA, WITHOUT LONG-TERM CURRENT USE OF INSULIN (H): Primary | ICD-10-CM

## 2017-08-22 LAB
CREAT UR-MCNC: 144 MG/DL
HBA1C MFR BLD: 9.2 % (ref 4.3–6)
MICROALBUMIN UR-MCNC: 20 MG/L
MICROALBUMIN/CREAT UR: 13.96 MG/G CR (ref 0–17)

## 2017-08-22 PROCEDURE — 99214 OFFICE O/P EST MOD 30 MIN: CPT | Mod: 25 | Performed by: FAMILY MEDICINE

## 2017-08-22 PROCEDURE — 83036 HEMOGLOBIN GLYCOSYLATED A1C: CPT | Performed by: FAMILY MEDICINE

## 2017-08-22 PROCEDURE — 36415 COLL VENOUS BLD VENIPUNCTURE: CPT | Performed by: FAMILY MEDICINE

## 2017-08-22 PROCEDURE — 90732 PPSV23 VACC 2 YRS+ SUBQ/IM: CPT | Performed by: FAMILY MEDICINE

## 2017-08-22 PROCEDURE — G0009 ADMIN PNEUMOCOCCAL VACCINE: HCPCS | Performed by: FAMILY MEDICINE

## 2017-08-22 PROCEDURE — 82043 UR ALBUMIN QUANTITATIVE: CPT | Performed by: FAMILY MEDICINE

## 2017-08-22 RX ORDER — PIOGLITAZONEHYDROCHLORIDE 45 MG/1
45 TABLET ORAL DAILY
Qty: 90 TABLET | Refills: 3 | Status: SHIPPED | OUTPATIENT
Start: 2017-08-22 | End: 2018-05-15

## 2017-08-22 NOTE — NURSING NOTE
Screening Questionnaire for Adult Immunization    Are you sick today?   No   Do you have allergies to medications, food, a vaccine component or latex?   No   Have you ever had a serious reaction after receiving a vaccination?   No   Do you have a long-term health problem with heart disease, lung disease, asthma, kidney disease, metabolic disease (e.g. diabetes), anemia, or other blood disorder?   No-notify PCP   Do you have cancer, leukemia, HIV/AIDS, or any other immune system problem?   No   In the past 3 months, have you taken medications that affect  your immune system, such as prednisone, other steroids, or anticancer drugs; drugs for the treatment of rheumatoid arthritis, Crohn s disease, or psoriasis; or have you had radiation treatments?   No   Have you had a seizure, or a brain or other nervous system problem?   No   During the past year, have you received a transfusion of blood or blood     products, or been given immune (gamma) globulin or antiviral drug?   No   For women: Are you pregnant or is there a chance you could become        pregnant during the next month?   No   Have you received any vaccinations in the past 4 weeks?   No     Immunization questionnaire answers were all negative.          Per orders of Dr. Wegener , injection of  PVC 23 given by Jassi Winston. Patient instructed to remain in clinic for 15 minutes afterwards, and to report any adverse reaction to me immediately.       Screening performed by Jassi Winston on 8/22/2017 at 10:33 AM.

## 2017-08-22 NOTE — MR AVS SNAPSHOT
After Visit Summary   8/22/2017    David Gutierrez Jr.    MRN: [id number]           Patient Information     Date Of Birth          1945        Visit Information        Provider Department      8/22/2017 10:00 AM Wegener, Joel Daniel Irwin, MD [facility]        Today's Diagnoses     Type 2 diabetes mellitus with hyperglycemia, without long-term current use of insulin (H)    -  1    Need for vaccination        Hypertension goal BP (blood pressure) < 140/90        Chronic gingivitis        Callus of foot          Care Instructions    ASSESSMENT AND PLAN  1. Type 2 diabetes mellitus with hyperglycemia, without long-term current use of insulin (H)  Lab Results   Component Value Date    A1C 9.2 08/22/2017    A1C 13.4 03/31/2017    A1C 10.3 07/29/2016    A1C 11.3 11/10/2015    A1C 13.0 01/28/2015     Uncontrolled but improved.     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  She helps keep track of appointments.     Increase metformin to 1000 mg twice daily and actos to 45 mg twice daily.     Follow up three months for next diabetes check /a1c.     Pneumonia vaccine today.     2.  Gingivitis:  Appreciated refill, is looking for a dentist now.     3.  Right great toe callous:  We will help him schedule with our podiatrist.         MYCHART SIGNUP FOR E-VISITS AND EASIER COMMUNICATION:  [email or web address]     Zipnosis:  [email or web address].  Sign up for e-visits for common illnesses!     RADIOLOGY:   [facility]:  [phone] to schedule any radiology tests at [facility]: [phone]    Mammograms/colonoscopies:  [phone]    CONSUMER PRICE LINE for estimates of test costs:  [phone]               Follow-ups after your visit        Your next 10 appointments already scheduled     Nov 24, 2017  9:00 AM CST   SHORT with Joel Daniel Irwin Wegener, MD   [facility] ([facility]  "[place]) [address] [place] [id number] [phone]              Who to contact     If you have questions or need follow up information about today's clinic visit or your schedule please contact [facility] directly at [phone].  Normal or non-critical lab and imaging results will be communicated to you by MyChart, letter or phone within 4 business days after the clinic has received the results. If you do not hear from us within 7 days, please contact the clinic through MyChart or phone. If you have a critical or abnormal lab result, we will notify you by phone as soon as possible.  Submit refill requests through [employer] or call your pharmacy and they will forward the refill request to us. Please allow 3 business days for your refill to be completed.          Additional Information About Your Visit        MyChart Information     [employer] lets you send messages to your doctor, view your test results, renew your prescriptions, schedule appointments and more. To sign up, go to [email or web address] . Click on \"Log in\" on the left side of the screen, which will take you to the Welcome page. Then click on \"Sign up Now\" on the right side of the page.     You will be asked to enter the access code listed below, as well as some personal information. Please follow the directions to create your username and password.     Your access code is: [email or web address]  Expires: 10/19/2017 11:19 AM     Your access code will  in 90 days. If you need help or a new code, please call your [place] clinic or [phone].        Care EveryWhere ID     This is your Care EveryWhere ID. This could be used by other organizations to access your [place] medical records  [id number]        Your Vitals Were     Pulse Temperature Pulse Oximetry BMI (Body Mass Index)          102 96.6  F (35.9  C) (Tympanic) 97% 26.45 kg/m2         Blood Pressure from Last 3 Encounters:   17 105/65   17 " 135/74   07/26/16 124/72    Weight from Last 3 Encounters:   08/22/17 195 lb (88.5 kg)   03/31/17 196 lb 8 oz (89.1 kg)   07/26/16 188 lb (85.3 kg)              We Performed the Following     1st  Administration  [82947]     Albumin Random Urine Quantitative     Hemoglobin A1c     Pneumococcal vaccine 23 valent PPSV23  (Pneumovax) [54198]          Today's Medication Changes          These changes are accurate as of: 8/22/17 11:28 AM.  If you have any questions, ask your nurse or doctor.               These medicines have changed or have updated prescriptions.        Dose/Directions    metFORMIN 1000 MG tablet   Commonly known as:  GLUCOPHAGE   This may have changed:    - medication strength  - how much to take   Used for:  Type 2 diabetes mellitus with hyperglycemia, without long-term current use of insulin (H)   Changed by:  Wegener, Joel Daniel Irwin, MD        Dose:  1000 mg   Take 1 tablet (1,000 mg) by mouth 2 times daily (with meals)   Quantity:  180 tablet   Refills:  3       pioglitazone 45 MG tablet   Commonly known as:  ACTOS   This may have changed:    - medication strength  - how much to take   Used for:  Type 2 diabetes mellitus with hyperglycemia, without long-term current use of insulin (H)   Changed by:  Wegener, Joel Daniel Irwin, MD        Dose:  45 mg   Take 1 tablet (45 mg) by mouth daily   Quantity:  90 tablet   Refills:  3            Where to get your medicines      These medications were sent to [facility] [address]  [address] [facility] [address]     Phone:  [phone]     metFORMIN 1000 MG tablet    pioglitazone 45 MG tablet                Primary Care Provider Office Phone # Fax #    Joel Daniel Irwin Wegener, [phone]       [address]  [facility] [address]        Equal Access to Services     TADEO MOY : Adriana Pena, elías moran, lisa ceballos  lakenrick nayak. So [facility] [phone].    ATENCIÓN: Si habla idnorah, tiene a rodriguez disposición servicios gratuitos de asistencia lingüística. Milly lofton [phone].    We comply with applicable federal civil rights laws and [place] laws. We do not discriminate on the basis of race, color, national origin, age, disability sex, sexual orientation or gender identity.            Thank you!     Thank you for choosing [facility]  for your care. Our goal is always to provide you with excellent care. Hearing back from our patients is one way we can continue to improve our services. Please take a few minutes to complete the written survey that you may receive in the mail after your visit with us. Thank you!             Your Updated Medication List - Protect others around you: Learn how to safely use, store and throw away your medicines at www.disposemymeds.org.          This list is accurate as of: 8/22/17 11:28 AM.  Always use your most recent med list.                   Brand Name Dispense Instructions for use Diagnosis    aspirin 81 MG tablet     90 tablet    Take 1 tablet (81 mg) by mouth daily    Type 2 diabetes mellitus without complication, without long-term current use of insulin (H)       blood glucose monitor Kit     1 kit    1 each daily.    Type 2 diabetes, HbA1c goal < 7% (H)       blood glucose monitoring test strip    no brand specified    3 Box    Use twice daily before meals and as needed for symptoms of low blood sugar.    Type 2 diabetes, HbA1c goal < 7% (H)       DAILY MULTIVITAMIN PO      Take 1 tablet by mouth.        doxycycline 20 MG tablet    PERIOSTAT    30 tablet    Take 1 tablet (20 mg) by mouth daily    Gingivitis       GARLIC      1 po qd        lisinopril 20 MG tablet    PRINIVIL/ZESTRIL    90 tablet    Take 1 tablet (20 mg) by mouth daily    Essential hypertension with goal blood pressure less than 140/90       metFORMIN 1000 MG tablet    GLUCOPHAGE    180 tablet    Take 1 tablet  (1,000 mg) by mouth 2 times daily (with meals)    Type 2 diabetes mellitus with hyperglycemia, without long-term current use of insulin (H)       Milk Thistle 500 MG Caps      Take  by mouth every 48 hours.        oxybutynin 5 MG 24 hr tablet    DITROPAN XL    90 tablet    Take 1 tablet (5 mg) by mouth daily    Overactive bladder       pioglitazone 45 MG tablet    ACTOS    90 tablet    Take 1 tablet (45 mg) by mouth daily    Type 2 diabetes mellitus with hyperglycemia, without long-term current use of insulin (H)       sildenafil 50 MG cap/tab    REVATIO/VIAGRA    12 tablet    Take 0.5-1 tablets (25-50 mg) by mouth daily as needed for erectile dysfunction Take 30 min to 4 hours before intercourse.  Never use with nitroglycerin, terazosin or doxazosin.    ED (erectile dysfunction)       simvastatin 20 MG tablet    ZOCOR    90 tablet    Take 1 tablet (20 mg) by mouth At Bedtime    Hyperlipidemia with target LDL less than 100       tamsulosin 0.4 MG capsule    FLOMAX    90 capsule    Take 1 capsule (0.4 mg) by mouth daily    Benign non-nodular prostatic hyperplasia with lower urinary tract symptoms       thin lancets    NO BRAND SPECIFIED    180 each    1 Device 2 times daily (before meals)    Type 2 diabetes, HbA1c goal < 7% (H)       triamterene-hydrochlorothiazide 37.5-25 MG per capsule    DYAZIDE    90 capsule    Take 1 capsule by mouth daily    Essential hypertension with goal blood pressure less than 140/90       verapamil 180 MG Cp24 24 hr capsule    VERELAN    90 capsule    Take 1 capsule (180 mg) by mouth daily    Essential hypertension with goal blood pressure less than 140/90

## 2017-08-22 NOTE — PATIENT INSTRUCTIONS
ASSESSMENT AND PLAN  1. Type 2 diabetes mellitus with hyperglycemia, without long-term current use of insulin (H)  Lab Results   Component Value Date    A1C 9.2 08/22/2017    A1C 13.4 03/31/2017    A1C 10.3 07/29/2016    A1C 11.3 11/10/2015    A1C 13.0 01/28/2015     Uncontrolled but improved.     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  She helps keep track of appointments.     Increase metformin to 1000 mg twice daily and actos to 45 mg twice daily.     Follow up three months for next diabetes check /a1c.     Pneumonia vaccine today.     2.  Gingivitis:  Appreciated refill, is looking for a dentist now.     3.  Right great toe callous:  We will help him schedule with our podiatrist.         MYCHART SIGNUP FOR E-VISITS AND EASIER COMMUNICATION:  [email or web address]     Zipnosis:  [email or web address].  Sign up for e-visits for common illnesses!     RADIOLOGY:   [facility]:  [phone] to schedule any radiology tests at [facility]: [phone]    Mammograms/colonoscopies:  [phone]    CONSUMER PRICE LINE for estimates of test costs:  [phone]

## 2017-08-22 NOTE — PROGRESS NOTES
SUBJECTIVE:   David Gutierrez Jr. is a 72 year old male who presents to clinic today for the following health issues:      Medication Followup of pioglitazone (ACTOS) 30 MG tablet    Taking Medication as prescribed: yes    Side Effects:  None    Medication Helping Symptoms:  yes     Additional history/life update:      Type 2 diabetes mellitus with hyperglycemia, without long-term current use of insulin (H): cut pop down from 4-5/day to two/day.  Apologizes for missed appointments.  Appreciates my care has new system in place with addison/per for keeping track of appointments.  Does not check often.   Need for vaccination  Hypertension goal BP (blood pressure) < 140/90: usually better second check he feels.   Chronic gingivitis: did get refill.  Is working on getting a land lord.   Callus of foot :right great toe, painful  Wondering if can be shaved off.           Problem list, Medication list, Allergies, and Medical/Social/Surgical histories reviewed in [facility] and updated as appropriate.  Labs reviewed in EPIC  BP Readings from Last 3 Encounters:   08/22/17 105/65   03/31/17 135/74   07/26/16 124/72    Wt Readings from Last 3 Encounters:   08/22/17 195 lb (88.5 kg)   03/31/17 196 lb 8 oz (89.1 kg)   07/26/16 188 lb (85.3 kg)                  Patient Active Problem List   Diagnosis     Hyperlipidemia with target LDL less than 100     Type 2 diabetes, HbA1c goal < 7% (H)     Hypertension goal BP (blood pressure) < 140/90     Advanced directives, counseling/discussion     Health Care Home     Type 2 diabetes mellitus with hyperglycemia (H)     Chronic gingivitis     Past Surgical History:   Procedure Laterality Date     SURGICAL HISTORY OF -   8/2003    thyroid nodule removal       Social History   Substance Use Topics     Smoking status: Former Smoker     Quit date: 1/1/1992     Smokeless tobacco: Never Used      Comment: social     Alcohol use Yes      Comment: rare     Family History   Problem Relation Age of  Onset     Breast Cancer Mother       age 51     C.A.D. Father      Hypertension Father      Cancer - colorectal Father       age 72     C.A.D. Maternal Grandfather      DIABETES Brother      type 2     DIABETES Sister      type 2         Current Outpatient Prescriptions   Medication Sig Dispense Refill     metFORMIN (GLUCOPHAGE) 1000 MG tablet Take 1 tablet (1,000 mg) by mouth 2 times daily (with meals) 180 tablet 3     pioglitazone (ACTOS) 45 MG tablet Take 1 tablet (45 mg) by mouth daily 90 tablet 3     doxycycline (PERIOSTAT) 20 MG tablet Take 1 tablet (20 mg) by mouth daily 30 tablet 0     simvastatin (ZOCOR) 20 MG tablet Take 1 tablet (20 mg) by mouth At Bedtime 90 tablet 3     tamsulosin (FLOMAX) 0.4 MG capsule Take 1 capsule (0.4 mg) by mouth daily 90 capsule 3     lisinopril (PRINIVIL/ZESTRIL) 20 MG tablet Take 1 tablet (20 mg) by mouth daily 90 tablet 3     triamterene-hydrochlorothiazide (DYAZIDE) 37.5-25 MG per capsule Take 1 capsule by mouth daily 90 capsule 3     verapamil (VERELAN) 180 MG CP24 24 hr capsule Take 1 capsule (180 mg) by mouth daily 90 capsule 3     oxybutynin (DITROPAN XL) 5 MG 24 hr tablet Take 1 tablet (5 mg) by mouth daily 90 tablet 3     aspirin 81 MG tablet Take 1 tablet (81 mg) by mouth daily 90 tablet 3     sildenafil (VIAGRA) 50 MG tablet Take 0.5-1 tablets (25-50 mg) by mouth daily as needed for erectile dysfunction Take 30 min to 4 hours before intercourse.  Never use with nitroglycerin, terazosin or doxazosin. 12 tablet 3     lancets thin MISC 1 Device 2 times daily (before meals) 180 each 0     glucose blood VI test strips strip Use twice daily before meals and as needed for symptoms of low blood sugar. 3 Box 3     blood glucose monitor KIT 1 each daily. 1 kit 0     Milk Thistle 500 MG CAPS Take  by mouth every 48 hours.       Multiple Vitamin (DAILY MULTIVITAMIN PO) Take 1 tablet by mouth.       GARLIC 1 po qd       [DISCONTINUED] metFORMIN (GLUCOPHAGE) 500 MG tablet  Take 1 tablet (500 mg) by mouth 2 times daily (with meals) 180 tablet 3     [DISCONTINUED] pioglitazone (ACTOS) 30 MG tablet Take 1 tablet (30 mg) by mouth daily 90 tablet 3     [DISCONTINUED] oxybutynin (DITROPAN XL) 5 MG 24 hr tablet Take 1 tablet by mouth daily. 90 tablet 3     Allergies   Allergen Reactions     No Known Drug Allergies      Recent Labs   Lab Test  08/22/17   1025  03/31/17   1252  07/29/16   1147  11/10/15   1010   02/28/14   1018   A1C  9.2*  13.4*  10.3*  11.3*   < >  13.6*   LDL   --   87  65  71   < >  103   HDL   --   34*  30*  33*   < >  30*   TRIG   --   115  84  84   < >  130   ALT   --   20  23  34   --   32   CR   --   1.17  1.07  1.01   < >  1.15   GFRESTIMATED   --   62  68  73   < >  64   GFRESTBLACK   --   75  83  89   < >  77   POTASSIUM   --   3.8  4.0  3.9   < >  4.1   TSH   --    --   1.10   --    --   1.39    < > = values in this interval not displayed.        ROS:  Constitutional, HEENT, cardiovascular, pulmonary, GI, , musculoskeletal, neuro, skin, endocrine and psych systems are negative, except as otherwise noted.        OBJECTIVE:  /65  Pulse 102  Temp 96.6  F (35.9  C) (Tympanic)  Wt 195 lb (88.5 kg)  SpO2 97%  BMI 26.45 kg/m2    EXAM:  GENERAL APPEARANCE: healthy, alert and no distress  RESP: lungs clear to auscultation - no rales, rhonchi or wheezes  CV: regular rates and rhythm, normal S1 S2, no S3 or S4 and no murmur, click or rub -    Right great toe with large 3 cm oval medial callous.       ASSESSMENT AND PLAN  Patient Instructions     ASSESSMENT AND PLAN  1. Type 2 diabetes mellitus with hyperglycemia, without long-term current use of insulin (H) - uncontrolled.   Lab Results   Component Value Date    A1C 9.2 08/22/2017    A1C 13.4 03/31/2017    A1C 10.3 07/29/2016    A1C 11.3 11/10/2015    A1C 13.0 01/28/2015     Uncontrolled but improved. On statin and aspirin appropriately.     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  She helps keep track of appointments.     Increase metformin to 1000 mg twice daily and actos to 45 mg daily.     Follow up three months for next diabetes check /a1c.     Pneumonia vaccine today.     Great job reducing pop!  Keep up the good work!     2.  Gingivitis:  Appreciated refill, is looking for a dentist now.     3.  Right great toe callous:  We will help him schedule with our podiatrist.     4.  Htn:  Re-check at goal.     Joel Wegener,MD

## 2017-09-12 DIAGNOSIS — K05.10 GINGIVITIS: ICD-10-CM

## 2017-09-12 NOTE — TELEPHONE ENCOUNTER
Medication Detail      Disp Refills Start End PAVAN   doxycycline (PERIOSTAT) 20 MG tablet 30 tablet 0 8/4/2017  No   Sig: Take 1 tablet (20 mg) by mouth daily       Last Office Visit with [facility], [facility] or [facility] prescribing provider: 8/22/17                                         Next 5 appointments (look out 90 days)     Nov 24, 2017  9:00 AM CST   SHORT with Joel Daniel Irwin Wegener, MD   [facility]    [address] [place] [id number] [phone]

## 2017-09-12 NOTE — TELEPHONE ENCOUNTER
Is this a nonstop abx for pt mouth health? If so, I added refills on.  Please advise.  Thanks!     Agnieszka Blood RN

## 2017-09-13 NOTE — TELEPHONE ENCOUNTER
Please call pt.  Remind him I told him I would no longer be prescribing this, he needs to establish care with a dentist and get the prescription from a dentist.     Let pharmacy know after discussing with him.     Joel Wegener,MD

## 2017-09-14 RX ORDER — DOXYCYCLINE HYCLATE 20 MG
TABLET ORAL
Qty: 30 TABLET | Refills: 11 | OUTPATIENT
Start: 2017-09-14

## 2017-12-04 DIAGNOSIS — N40.1 BENIGN NON-NODULAR PROSTATIC HYPERPLASIA WITH LOWER URINARY TRACT SYMPTOMS: ICD-10-CM

## 2017-12-05 RX ORDER — TAMSULOSIN HYDROCHLORIDE 0.4 MG/1
CAPSULE ORAL
Qty: 90 CAPSULE | Refills: 2 | Status: SHIPPED | OUTPATIENT
Start: 2017-12-05 | End: 2018-08-25

## 2017-12-05 NOTE — TELEPHONE ENCOUNTER
Last office visit 11/24/2017    Routing refill request to provider for review/approval because:  Interaction on med list - Sildenafil-  Provider to review and fill please      Thank you,  Modesto Guzman RN

## 2018-01-22 ENCOUNTER — CARE COORDINATION (OUTPATIENT)
Dept: GERIATRIC MEDICINE | Facility: CLINIC | Age: 73
End: 2018-01-22

## 2018-01-22 NOTE — PROGRESS NOTES
1/22/18:  TM left for client requesting a call back to complete 6 month phone screen.  Patricia Hayward RN, BSN, PHN  [facility]   [phone]

## 2018-01-26 ENCOUNTER — CARE COORDINATION (OUTPATIENT)
Dept: GERIATRIC MEDICINE | Facility: CLINIC | Age: 73
End: 2018-01-26

## 2018-01-26 NOTE — PROGRESS NOTES
TM left for client requesting call back to complete 6 month phone screen.  Patricia Hayward RN, BSN, PHN  [facility]   [phone]

## 2018-02-05 ENCOUNTER — OFFICE VISIT (OUTPATIENT)
Dept: FAMILY MEDICINE | Facility: CLINIC | Age: 73
End: 2018-02-05
Payer: COMMERCIAL

## 2018-02-05 VITALS
RESPIRATION RATE: 12 BRPM | TEMPERATURE: 98.6 F | SYSTOLIC BLOOD PRESSURE: 137 MMHG | BODY MASS INDEX: 27.36 KG/M2 | DIASTOLIC BLOOD PRESSURE: 78 MMHG | HEIGHT: 72 IN | HEART RATE: 114 BPM | WEIGHT: 202 LBS | OXYGEN SATURATION: 100 %

## 2018-02-05 DIAGNOSIS — E78.5 HYPERLIPIDEMIA WITH TARGET LDL LESS THAN 100: ICD-10-CM

## 2018-02-05 DIAGNOSIS — Z23 NEED FOR PROPHYLACTIC VACCINATION AND INOCULATION AGAINST INFLUENZA: ICD-10-CM

## 2018-02-05 DIAGNOSIS — Z23 NEED FOR TDAP VACCINATION: ICD-10-CM

## 2018-02-05 DIAGNOSIS — E11.65 TYPE 2 DIABETES MELLITUS WITH HYPERGLYCEMIA, WITHOUT LONG-TERM CURRENT USE OF INSULIN (H): ICD-10-CM

## 2018-02-05 DIAGNOSIS — Z00.01 ENCOUNTER FOR GENERAL ADULT MEDICAL EXAMINATION WITH ABNORMAL FINDINGS: Primary | ICD-10-CM

## 2018-02-05 LAB — HBA1C MFR BLD: 10.4 % (ref 4.3–6)

## 2018-02-05 PROCEDURE — 90662 IIV NO PRSV INCREASED AG IM: CPT | Performed by: FAMILY MEDICINE

## 2018-02-05 PROCEDURE — 82043 UR ALBUMIN QUANTITATIVE: CPT | Performed by: FAMILY MEDICINE

## 2018-02-05 PROCEDURE — 99213 OFFICE O/P EST LOW 20 MIN: CPT | Mod: 25 | Performed by: FAMILY MEDICINE

## 2018-02-05 PROCEDURE — 90471 IMMUNIZATION ADMIN: CPT | Performed by: FAMILY MEDICINE

## 2018-02-05 PROCEDURE — 80061 LIPID PANEL: CPT | Performed by: FAMILY MEDICINE

## 2018-02-05 PROCEDURE — 36415 COLL VENOUS BLD VENIPUNCTURE: CPT | Performed by: FAMILY MEDICINE

## 2018-02-05 PROCEDURE — G0008 ADMIN INFLUENZA VIRUS VAC: HCPCS | Mod: 59 | Performed by: FAMILY MEDICINE

## 2018-02-05 PROCEDURE — G0439 PPPS, SUBSEQ VISIT: HCPCS | Performed by: FAMILY MEDICINE

## 2018-02-05 PROCEDURE — 84443 ASSAY THYROID STIM HORMONE: CPT | Performed by: FAMILY MEDICINE

## 2018-02-05 PROCEDURE — 90715 TDAP VACCINE 7 YRS/> IM: CPT | Performed by: FAMILY MEDICINE

## 2018-02-05 PROCEDURE — 83036 HEMOGLOBIN GLYCOSYLATED A1C: CPT | Performed by: FAMILY MEDICINE

## 2018-02-05 PROCEDURE — 80053 COMPREHEN METABOLIC PANEL: CPT | Mod: 59 | Performed by: FAMILY MEDICINE

## 2018-02-05 PROCEDURE — 99207 C PAF COMPLETED  NO CHARGE: CPT | Performed by: FAMILY MEDICINE

## 2018-02-05 NOTE — PROGRESS NOTES
"    SUBJECTIVE:   David Gutierrez Jr. is a 72 year old male who presents for Preventive Visit.    Are you in the first 12 months of your Medicare Part B coverage?  No    Healthy Habits:    Do you get at least three servings of calcium containing foods daily (dairy, green leafy vegetables, etc.)? no, taking calcium and/or vitamin D supplement: no    Amount of exercise or daily activities, outside of work: 7 day(s) per week    Problems taking medications regularly No    Medication side effects: No    Have you had an eye exam in the past two years? yes    Do you see a dentist twice per year? yes    Do you have sleep apnea, excessive snoring or daytime drowsiness?no      Ability to successfully perform activities of daily living: Yes, no assistance needed    Home safety:  none identified     Hearing impairment: No    Fall risk:  Fallen 2 or more times in the past year?: No  Any fall with injury in the past year?: No      COGNITIVE SCREEN  1) Repeat 3 items (Banana, Sunrise, Chair)    2) Clock draw: ABNORMAL missing number 7   3) 3 item recall: Recalls 1 object   Results: ABNORMAL clock, 1-2 items recalled: PROBABLE COGNITIVE IMPAIRMENT, **INFORM PROVIDER**    Mini-CogTM Copyright S Love. Licensed by the author for use in [facility]; reprinted with permission ([email or web address]). All rights reserved.            -------------------------------------  Diabetes:  Thinks \"better\" than it was .  Sugars around 200.  Less than when diagnosed at 300.      Reviewed and updated as needed this visit by clinical staff  Tobacco  Allergies  Meds  Med Hx  Surg Hx  Fam Hx  Soc Hx        Reviewed and updated as needed this visit by Provider  Meds        Social History   Substance Use Topics     Smoking status: Former Smoker     Quit date: 1/1/1992     Smokeless tobacco: Never Used      Comment: social     Alcohol use Yes      Comment: rare       If you drink alcohol do you typically have >3 drinks per day or >7 drinks " "per week? No                        Today's PHQ-2 Score:   PHQ-2 ( 1999 [employer]) 8/22/2017 3/31/2017   Q1: Little interest or pleasure in doing things 0 0   Q2: Feeling down, depressed or hopeless 0 0   PHQ-2 Score 0 0   Q1: Little interest or pleasure in doing things - Not at all   Q2: Feeling down, depressed or hopeless - Not at all   PHQ-2 Score - 0       Do you feel safe in your environment - Yes    Do you have a Health Care Directive?: No: Advance care planning reviewed with patient; information given to patient to review.    Current providers sharing in care for this patient include:   Patient Care Team:  Wegener, Joel Daniel Irwin, MD as PCP - General (Family Practice)  Patricia Hayward RN as   Sita Hernandez as Other (see comments)  Edel Durand as Other (see comments)    The following health maintenance items are reviewed in Epic and correct as of today:  Health Maintenance   Topic Date Due     EYE EXAM Q1 YEAR  06/12/2016     ADVANCE DIRECTIVE PLANNING Q5 YRS  03/23/2017     FOOT EXAM Q1 YEAR  03/31/2018     FIT Q1 YR  04/12/2018     A1C Q3 MO  05/05/2018     INFLUENZA VACCINE (SYSTEM ASSIGNED)  09/01/2018     BMP Q1 YR  02/05/2019     FALL RISK ASSESSMENT  02/05/2019     LIPID MONITORING Q1 YEAR  02/05/2019     MICROALBUMIN Q1 YEAR  02/05/2019     TSH W/ FREE T4 REFLEX Q2 YEAR  02/05/2020     TETANUS IMMUNIZATION (SYSTEM ASSIGNED)  02/05/2028     PNEUMOCOCCAL  Completed     AORTIC ANEURYSM SCREENING (SYSTEM ASSIGNED)  Completed     HEPATITIS C SCREENING  Completed     Labs reviewed in EPIC        ROS:  Constitutional, HEENT, cardiovascular, pulmonary, GI, , musculoskeletal, neuro, skin, endocrine and psych systems are negative, except as otherwise noted.    OBJECTIVE:   /78 (BP Location: Right arm, Patient Position: Chair, Cuff Size: Adult Large)  Pulse 114  Temp 98.6  F (37  C) (Oral)  Resp 12  Ht 6' 0.25\" (1.835 m)  Wt 202 lb (91.6 kg)  SpO2 100%  BMI 27.21 kg/m2 " "Estimated body mass index is 27.21 kg/(m^2) as calculated from the following:    Height as of this encounter: 6' 0.25\" (1.835 m).    Weight as of this encounter: 202 lb (91.6 kg).  EXAM:   GENERAL: healthy, alert and no distress  EYES: Eyes grossly normal to inspection, PERRL and conjunctivae and sclerae normal  HENT: ear canals and TM's normal, nose and mouth without ulcers or lesions  NECK: no adenopathy, no asymmetry, masses, or scars and thyroid normal to palpation  RESP: lungs clear to auscultation - no rales, rhonchi or wheezes  CV: regular rate and rhythm, normal S1 S2, no S3 or S4, no murmur, click or rub, no peripheral edema and peripheral pulses strong  ABDOMEN: soft, nontender, no hepatosplenomegaly, no masses and bowel sounds normal   (male): normal male genitalia without lesions or urethral discharge, no hernia  RECTAL: normal sphincter tone, no rectal masses, prostate normal size, smooth, nontender without nodules or masses  MS: no gross musculoskeletal defects noted, no edema  SKIN: no suspicious lesions or rashes  NEURO: Normal strength and tone, mentation intact and speech normal  PSYCH: mentation appears normal, affect normal/bright    Lab Results   Component Value Date    A1C 10.4 02/05/2018    A1C 9.2 08/22/2017    A1C 13.4 03/31/2017    A1C 10.3 07/29/2016    A1C 11.3 11/10/2015         ASSESSMENT / PLAN:   1. Encounter for general adult medical examination with abnormal findings  Agrees to tdap and flu vaccines.     2. Type 2 diabetes mellitus with hyperglycemia, without long-term current use of insulin (H)  Uncontrolled.  I explained to him a1c was very high and we really have not made progress.  Drinks some pop, recommended eliminating.  Explained really worse controlled than he feels and it is increasing heart attack and stroke risk.  We have discussed this mutliple times.  Today he voiced more concern that I may be correct.  Does not want to change/add medications.  I discussed several " "options (non insulin injectables, januvia, insulin) will continue metformin and actos.  On ace and statin appropriately.  Will re-start aspirin, not taking regularly.     Follow up 3 months .  He has agreed to advance therapy if not improved.  Offered/declined nutrition consultation/education.  Will eliminate pop.     Labs to evaluate for organ damage and hyperthyroid contributing:   - Comprehensive metabolic panel  - Albumin Random Urine Quantitative with Creat Ratio  - TSH with free T4 reflex  - Hemoglobin A1c    3. Hyperlipidemia with target LDL less than 100    - Lipid panel reflex to direct LDL Fasting    4. Need for Tdap vaccination    - TDAP VACCINE (ADACEL)  - EA ADD'L VACCINE    5. Need for prophylactic vaccination and inoculation against influenza    - FLU VACCINE, INCREASED ANTIGEN, PRESV FREE, AGE 65+ [34594]  - Vaccine Administration, Initial [42259]    End of Life Planning:  Patient currently has an advanced directive: No.  I have verified the patient's ablity to prepare an advanced directive/make health care decisions.  Literature was provided to assist patient in preparing an advanced directive.    COUNSELING:  Reviewed preventive health counseling, as reflected in patient instructions       Regular exercise       Healthy diet/nutrition       Colon cancer screening        Estimated body mass index is 27.21 kg/(m^2) as calculated from the following:    Height as of this encounter: 6' 0.25\" (1.835 m).    Weight as of this encounter: 202 lb (91.6 kg).  Weight management plan: Discussed healthy diet and exercise guidelines and patient will follow up in 12 months in clinic to re-evaluate.     reports that he quit smoking about 26 years ago. He has never used smokeless tobacco.      Appropriate preventive services were discussed with this patient, including applicable screening as appropriate for cardiovascular disease, diabetes, osteopenia/osteoporosis, and glaucoma.  As appropriate for age/gender, " discussed screening for colorectal cancer, prostate cancer, breast cancer, and cervical cancer. Checklist reviewing preventive services available has been given to the patient.    Reviewed patients plan of care and provided an AVS. The Basic Care Plan (routine screening as documented in Health Maintenance) for David meets the Care Plan requirement. This Care Plan has been established and reviewed with the Patient.    Counseling Resources:  ATP IV Guidelines  Pooled Cohorts Equation Calculator  Breast Cancer Risk Calculator  FRAX Risk Assessment  ICSI Preventive Guidelines  Dietary Guidelines for Americans, 2010  Risk Ident's MyPlate  ASA Prophylaxis  Lung CA Screening    Joel Daniel Wegener, MD  [facility]    Injectable Influenza Immunization Documentation    1.  Is the person to be vaccinated sick today?   No    2. Does the person to be vaccinated have an allergy to a component   of the vaccine?   No  Egg Allergy Algorithm Link    3. Has the person to be vaccinated ever had a serious reaction   to influenza vaccine in the past?   No    4. Has the person to be vaccinated ever had Guillain-Barré syndrome?   No    Form completed by Josselin Marte [facility]

## 2018-02-05 NOTE — LETTER
February 9, 2018      David Gutierrez Jr.  [address] [place] [address]        Dear ,    We are writing to inform you of your test results.    Here are your labs - no further medication changes are needed based on these.    Resulted Orders   Comprehensive metabolic panel   Result Value Ref Range    Sodium 137 133 - 144 mmol/L    Potassium 4.1 3.4 - 5.3 mmol/L    Chloride 102 94 - 109 mmol/L    Carbon Dioxide 27 20 - 32 mmol/L    Anion Gap 8 3 - 14 mmol/L    Glucose 260 (H) 70 - 99 mg/dL      Comment:      Non Fasting    Urea Nitrogen 26 7 - 30 mg/dL    Creatinine 1.06 0.66 - 1.25 mg/dL    GFR Estimate 69 >60 mL/min/1.7m2      Comment:      Non  GFR Calc    GFR Estimate If Black 83 >60 mL/min/1.7m2      Comment:       GFR Calc    Calcium 9.3 8.5 - 10.1 mg/dL    Bilirubin Total 0.8 0.2 - 1.3 mg/dL    Albumin 3.8 3.4 - 5.0 g/dL    Protein Total 7.3 6.8 - 8.8 g/dL    Alkaline Phosphatase 96 40 - 150 U/L    ALT 23 0 - 70 U/L    AST 15 0 - 45 U/L   Lipid panel reflex to direct LDL Fasting   Result Value Ref Range    Cholesterol 142 <200 mg/dL    Triglycerides 97 <150 mg/dL      Comment:      Non Fasting    HDL Cholesterol 39 (L) >39 mg/dL    LDL Cholesterol Calculated 84 <100 mg/dL      Comment:      Desirable:       <100 mg/dl    Non HDL Cholesterol 103 <130 mg/dL   Albumin Random Urine Quantitative with Creat Ratio   Result Value Ref Range    Creatinine Urine 92 mg/dL    Albumin Urine mg/L 43 mg/L    Albumin Urine mg/g Cr 46.81 (H) 0 - 17 mg/g Cr   TSH with free T4 reflex   Result Value Ref Range    TSH 1.38 0.40 - 4.00 mU/L   Hemoglobin A1c   Result Value Ref Range    Hemoglobin A1C 10.4 (H) 4.3 - 6.0 %       If you have any questions or concerns, please call the clinic at the number listed above.       Sincerely,        Joel Daniel Wegener, MD/nr

## 2018-02-06 LAB
ALBUMIN SERPL-MCNC: 3.8 G/DL (ref 3.4–5)
ALP SERPL-CCNC: 96 U/L (ref 40–150)
ALT SERPL W P-5'-P-CCNC: 23 U/L (ref 0–70)
ANION GAP SERPL CALCULATED.3IONS-SCNC: 8 MMOL/L (ref 3–14)
AST SERPL W P-5'-P-CCNC: 15 U/L (ref 0–45)
BILIRUB SERPL-MCNC: 0.8 MG/DL (ref 0.2–1.3)
BUN SERPL-MCNC: 26 MG/DL (ref 7–30)
CALCIUM SERPL-MCNC: 9.3 MG/DL (ref 8.5–10.1)
CHLORIDE SERPL-SCNC: 102 MMOL/L (ref 94–109)
CHOLEST SERPL-MCNC: 142 MG/DL
CO2 SERPL-SCNC: 27 MMOL/L (ref 20–32)
CREAT SERPL-MCNC: 1.06 MG/DL (ref 0.66–1.25)
CREAT UR-MCNC: 92 MG/DL
GFR SERPL CREATININE-BSD FRML MDRD: 69 ML/MIN/1.7M2
GLUCOSE SERPL-MCNC: 260 MG/DL (ref 70–99)
HDLC SERPL-MCNC: 39 MG/DL
LDLC SERPL CALC-MCNC: 84 MG/DL
MICROALBUMIN UR-MCNC: 43 MG/L
MICROALBUMIN/CREAT UR: 46.81 MG/G CR (ref 0–17)
NONHDLC SERPL-MCNC: 103 MG/DL
POTASSIUM SERPL-SCNC: 4.1 MMOL/L (ref 3.4–5.3)
PROT SERPL-MCNC: 7.3 G/DL (ref 6.8–8.8)
SODIUM SERPL-SCNC: 137 MMOL/L (ref 133–144)
TRIGL SERPL-MCNC: 97 MG/DL
TSH SERPL DL<=0.005 MIU/L-ACNC: 1.38 MU/L (ref 0.4–4)

## 2018-02-12 ENCOUNTER — CARE COORDINATION (OUTPATIENT)
Dept: GERIATRIC MEDICINE | Facility: CLINIC | Age: 73
End: 2018-02-12

## 2018-02-12 NOTE — PROGRESS NOTES
TM left for client requesting a call back.  Patricia Hayward RN, BSN, PHN  [facility]   [phone]

## 2018-02-12 NOTE — PROGRESS NOTES
"Per CC, mailed client an \"Unable to Contact\" letter.    Lindsay Tavarez  Case Management Specialist  [facility]  [phone]      "

## 2018-04-19 DIAGNOSIS — I10 ESSENTIAL HYPERTENSION WITH GOAL BLOOD PRESSURE LESS THAN 140/90: ICD-10-CM

## 2018-04-20 NOTE — TELEPHONE ENCOUNTER
"Requested Prescriptions   Pending Prescriptions Disp Refills     lisinopril (PRINIVIL/ZESTRIL) 20 MG tablet [Pharmacy Med Name: LISINOPRIL 20MG TABS]  Last Written Prescription Date:  4/2/2017  Last Fill Quantity: 90 tabs,  # refills: 3   Last office visit: 2/5/2018 with prescribing provider:  Choco   Future Office Visit:   Next 5 appointments (look out 90 days)     May 04, 2018  9:40 AM CDT   SHORT with Joel Daniel Irwin Wegener, MD   [facility]    [address] [place] [id number] [phone]                  90 tablet 3     Sig: TAKE ONE TABLET BY MOUTH EVERY DAY    ACE Inhibitors (Including Combos) Protocol Passed    4/19/2018  7:00 PM       Passed - Blood pressure under 140/90 in past 12 months    BP Readings from Last 3 Encounters:   02/05/18 137/78   08/22/17 105/65   03/31/17 135/74                Passed - Recent (12 mo) or future (30 days) visit within the authorizing provider's specialty    Patient had office visit in the last 12 months or has a visit in the next 30 days with authorizing provider or within the authorizing provider's specialty.  See \"Patient Info\" tab in inbasket, or \"Choose Columns\" in Meds & Orders section of the refill encounter.           Passed - Patient is age 18 or older       Passed - Normal serum creatinine on file in past 12 months    Recent Labs   Lab Test  02/05/18   1114   CR  1.06            Passed - Normal serum potassium on file in past 12 months    Recent Labs   Lab Test  02/05/18   1114   POTASSIUM  4.1               "

## 2018-04-23 RX ORDER — LISINOPRIL 20 MG/1
TABLET ORAL
Qty: 90 TABLET | Refills: 2 | Status: SHIPPED | OUTPATIENT
Start: 2018-04-23 | End: 2019-07-05

## 2018-05-10 DIAGNOSIS — E11.65 TYPE 2 DIABETES MELLITUS WITH HYPERGLYCEMIA, WITHOUT LONG-TERM CURRENT USE OF INSULIN (H): ICD-10-CM

## 2018-05-11 NOTE — TELEPHONE ENCOUNTER
"Requested Prescriptions   Pending Prescriptions Disp Refills     pioglitazone (ACTOS) 30 MG tablet [Pharmacy Med Name: PIOGLITAZONE HCL 30MG TABS]  Last Written Prescription Date:  8/22/2017  Last Fill Quantity: 90 tabs,  # refills: 3   Last office visit: 2/5/2018 with prescribing provider:  Wegener   Future Office Visit:     90 tablet 3     Sig: TAKE ONE TABLET BY MOUTH EVERY DAY    Thiazolidinedione Agents (TZDs)  Failed    5/10/2018  6:59 PM       Failed - Patient has documented A1c within the specified period of time.    If HgbA1C is 8 or greater, it needs to be on file within the past 3 months.  If less than 8, must be on file within the past 6 months.     Recent Labs   Lab Test  02/05/18   1114   A1C  10.4*            Passed - Blood pressure less than 140/90 in past 6 months    BP Readings from Last 3 Encounters:   02/05/18 137/78   08/22/17 105/65   03/31/17 135/74                Passed - Patient has documented LDL within the past 12 mos.    Recent Labs   Lab Test  02/05/18   1114   LDL  84            Passed - Patient has a normal ALT within the past 12 mos.    Recent Labs   Lab Test  02/05/18   1114   ALT  23            Passed - Patient has a normal AST within the past 12 mos.     Recent Labs   Lab Test  02/05/18   1114   AST  15            Passed - Patient has had a Microalbumin in the past 12 mos.    Recent Labs   Lab Test  02/05/18   1115   MICROL  43   UMALCR  46.81*            Passed - Diagnosis not CHF       Passed - Patient is age 18 or older       Passed - Patient has a normal serum Creatinine in the past 12 months    Recent Labs   Lab Test  02/05/18   1114   CR  1.06            Passed - Recent (6 mo) or future (30 days) visit within the authorizing provider's specialty    Patient had office visit in the last 6 months or has a visit in the next 30 days with authorizing provider or within the authorizing provider's specialty.  See \"Patient Info\" tab in inbasket, or \"Choose Columns\" in Meds & Orders " section of the refill encounter.

## 2018-05-15 RX ORDER — PIOGLITAZONEHYDROCHLORIDE 45 MG/1
45 TABLET ORAL DAILY
Qty: 90 TABLET | Refills: 3 | Status: SHIPPED | OUTPATIENT
Start: 2018-05-15 | End: 2019-06-03

## 2018-05-15 RX ORDER — PIOGLITAZONEHYDROCHLORIDE 30 MG/1
TABLET ORAL
Qty: 30 TABLET | Refills: 0 | OUTPATIENT
Start: 2018-05-15

## 2018-05-15 NOTE — TELEPHONE ENCOUNTER
Pharmacy is asking for 30 mg tabs  They have a script on file for 45 mg tabs  Provider please review.  Current med list says 45 mg.  Isa Merrill RN

## 2018-06-11 ENCOUNTER — PATIENT OUTREACH (OUTPATIENT)
Dept: GERIATRIC MEDICINE | Facility: CLINIC | Age: 73
End: 2018-06-11

## 2018-06-11 NOTE — PROGRESS NOTES
[facility] Care Coordination Contact  Call placed to member to schedule a home visit appointment to complete the annual HRA. Client's phone was disconnected. TC to client's emergency contact, Daughter Dana Gutierrez, but her mailbox was full. Writer will have CMS send an unable to contact letter to client.  Patricia Hayward RN, BSN, PHN  [facility]   [phone]

## 2018-06-12 ENCOUNTER — PATIENT OUTREACH (OUTPATIENT)
Dept: GERIATRIC MEDICINE | Facility: CLINIC | Age: 73
End: 2018-06-12

## 2018-06-12 NOTE — PROGRESS NOTES
[facility] Care Coordination Contact  Client's phone not in services. TC to client's daughter Dana Gutierrez LM requesting a call back with client's number or to have him call me regarding an annual home visit.  Patricia Hayward, RN, BSN, PHN  [facility]   [phone]

## 2018-06-12 NOTE — LETTER
June 12, 2018    ANDRADE ARANDA JR.  [address]  [place] [address]    Dear Andrade,     I have been unable to reach you by telephone. I am writing to ask you or a family member to call me at [phone]. If you reach my voicemail, please leave a message with your daytime telephone number and a date and time that I can call you. If you are hearing impaired, please call the [place] [employer] at [phone] or [phone] (kwicag-qg-vpulyo relay service).     The reason I am trying to reach you is:    [] Six (6) month check-in  [] To schedule your annual assessment  [] Other:        Please call me as soon as you receive this letter. I look forward to speaking with you.    Sincerely,       Patricia Hayward RN, BSN, PHN  [facility]  [phone]  [email or web address]    [facility] is a health plan that contracts with both Medicare and the [place] Medical Assistance (Medicaid) program to provide benefits of both programs to enrollees. Enrollment in [facility] depends on contract renewal.    MSC+ [id number] ([id number])      (11/16)

## 2018-06-12 NOTE — PROGRESS NOTES
"[facility] Care Coordination Contact  Per CC, mailed client an \"Unable to Contact\" letter.    Lindsay Tavarez  Case Management Specialist  [facility]  [phone]    "

## 2018-06-18 NOTE — PROGRESS NOTES
[facility] Care Coordination Contact    [facility] Refusal Telephone Assessment    Member refused home visit HRA on June 18, 2018.    ER visits: No  Hospitalizations: No  Health concerns: None  Falls/Injuries: No  ADL/IADL Dependencies:        Member currently receiving the following home care services:   None  Member currently receiving the following community resources:  None  Informal support(s):  Unknown    Follow-Up Plan: Member informed of future contact, plan to f/u with member with a 6 month telephone assessment and offer a home visit.  Contact information shared with member and family, encouraged member to call with any questions or concerns at any time.    Requested CMS to mail refusal letter.    Patricia Hayward RN, BSN, PHN  [facility]   [phone]

## 2018-06-19 ENCOUNTER — PATIENT OUTREACH (OUTPATIENT)
Dept: GERIATRIC MEDICINE | Facility: CLINIC | Age: 73
End: 2018-06-19

## 2018-06-19 NOTE — LETTER
[address], [address]  [place] [address]  Phone:  [phone]  Fax:  [phone]        June 19, 2018    ANDRADE ARANDA JR.  [address]  [place] [address]    Dear Andrade,    My name is Patricia Hayward RN, BSN, PHN and I am your [facility] Care Coordinator.  You have indicated that you are not interested in meeting with me in person to complete a Health Risk Assessment.     As your care coordinator, I work in collaboration with your primary physician and clinic.  We also want you to be aware of the health plan initiatives that are available to you, free of charge.     Currently, the health plan is supporting the pneumonia vaccine and prescription ordered calcium and vitamin D supplements.  Please call me for more details, my number is listed below.    [facility] encourages all members to contact their care coordinator if they have had any change in condition, a recent emergency room visit or hospital stay.    Thank you,       Patricia Hayward RN, BSN, PHN  Care Coordinator  [facility]  [phone]

## 2018-06-19 NOTE — PROGRESS NOTES
"[facility] Care Coordination Contact  Per CC, mailed client a \"Refusal of Home Visit\" letter.  MMIS entry.    Lindsay Tavarez  Case Management Specialist  [facility]  [phone]       "

## 2018-08-17 DIAGNOSIS — E78.5 HYPERLIPIDEMIA WITH TARGET LDL LESS THAN 100: ICD-10-CM

## 2018-08-17 NOTE — TELEPHONE ENCOUNTER
"Requested Prescriptions   Pending Prescriptions Disp Refills     simvastatin (ZOCOR) 20 MG tablet [Pharmacy Med Name: SIMVASTATIN 20MG TABS]  Last Written Prescription Date:  4-2-17  Last Fill Quantity: 90 tab,  # refills: 3   Last office visit: 7-31-18 with prescribing provider:  Wegener, Joel   Future Office Visit:    90 tablet 3     Sig: TAKE ONE TABLET BY MOUTH AT BEDTIME    Statins Protocol Passed    8/17/2018  1:33 PM       Passed - LDL on file in past 12 months    Recent Labs   Lab Test  02/05/18   1114   LDL  84          Passed - No abnormal creatine kinase in past 12 months    No lab results found.          Passed - Recent (12 mo) or future (30 days) visit within the authorizing provider's specialty    Patient had office visit in the last 12 months or has a visit in the next 30 days with authorizing provider or within the authorizing provider's specialty.  See \"Patient Info\" tab in inbasket, or \"Choose Columns\" in Meds & Orders section of the refill encounter.           Passed - Patient is age 18 or older          "

## 2018-08-22 RX ORDER — SIMVASTATIN 20 MG
TABLET ORAL
Qty: 90 TABLET | Refills: 3 | Status: SHIPPED | OUTPATIENT
Start: 2018-08-22 | End: 2019-04-01

## 2018-08-22 NOTE — TELEPHONE ENCOUNTER
Routing refill request to provider for review/approval because:  A break in medication  Agnieszka Mahoney RN - Triage  [facility]

## 2018-08-25 DIAGNOSIS — N40.1 BENIGN NON-NODULAR PROSTATIC HYPERPLASIA WITH LOWER URINARY TRACT SYMPTOMS: ICD-10-CM

## 2018-08-26 NOTE — TELEPHONE ENCOUNTER
"Requested Prescriptions   Pending Prescriptions Disp Refills     tamsulosin (FLOMAX) 0.4 MG capsule [Pharmacy Med Name: TAMSULOSIN HCL 0.4MG CAPS]  Last Written Prescription Date:  12/5/17  Last Fill Quantity: 90,  # refills: 2   Last office visit: 2/5/2018 with prescribing provider:     Future Office Visit:   Next 5 appointments (look out 90 days)     Sep 04, 2018  8:40 AM CDT   Office Visit with Joel Daniel Irwin Wegener, MD   [facility] ([facility])    [address] [place] [id number] [phone]                90 capsule 2     Sig: TAKE ONE CAPSULE BY MOUTH EVERY DAY    Alpha Blockers Failed    8/25/2018 11:30 AM       Failed - Patient does not have Tadalafil, Vardenafil, or Sildenafil on their medication list       Passed - Blood pressure under 140/90 in past 12 months    BP Readings from Last 3 Encounters:   02/05/18 137/78   08/22/17 105/65   03/31/17 135/74          Passed - Recent (12 mo) or future (30 days) visit within the authorizing provider's specialty    Patient had office visit in the last 12 months or has a visit in the next 30 days with authorizing provider or within the authorizing provider's specialty.  See \"Patient Info\" tab in inbasket, or \"Choose Columns\" in Meds & Orders section of the refill encounter.        Passed - Patient is 18 years of age or older          "

## 2018-08-27 RX ORDER — TAMSULOSIN HYDROCHLORIDE 0.4 MG/1
CAPSULE ORAL
Qty: 90 CAPSULE | Refills: 1 | Status: SHIPPED | OUTPATIENT
Start: 2018-08-27 | End: 2019-02-16

## 2018-08-27 NOTE — TELEPHONE ENCOUNTER
Prescription approved per [facility] Refill Protocol.    Signed Prescriptions:                        Disp   Refills    tamsulosin (FLOMAX) 0.4 MG capsule         90 cap*1        Sig: TAKE ONE CAPSULE BY MOUTH EVERY DAY  Authorizing Provider: WEGENER, JOEL DANIEL IRWIN  Ordering User: ORION STEVEN      Closing encounter - no further actions needed at this time    Orion Steven RN

## 2018-09-17 ENCOUNTER — TELEPHONE (OUTPATIENT)
Dept: FAMILY MEDICINE | Facility: CLINIC | Age: 73
End: 2018-09-17

## 2018-10-12 ENCOUNTER — TELEPHONE (OUTPATIENT)
Dept: FAMILY MEDICINE | Facility: CLINIC | Age: 73
End: 2018-10-12

## 2018-10-12 NOTE — TELEPHONE ENCOUNTER
1st attempt Mailed letter to patient letting them know they a due for a diabetes check appointment

## 2018-10-15 ENCOUNTER — TELEPHONE (OUTPATIENT)
Dept: FAMILY MEDICINE | Facility: CLINIC | Age: 73
End: 2018-10-15

## 2018-10-15 DIAGNOSIS — I10 HYPERTENSION GOAL BP (BLOOD PRESSURE) < 140/90: Primary | ICD-10-CM

## 2018-10-15 NOTE — TELEPHONE ENCOUNTER
Verapamil 180mg caps are no longer available. Med is on long term backorder no eta available. We do have tablets in stock. Please send a new rx for tablets asap.         Thanks, Melina Maria [facility]

## 2018-10-16 RX ORDER — VERAPAMIL HYDROCHLORIDE 180 MG/1
180 TABLET, EXTENDED RELEASE ORAL DAILY
Qty: 90 TABLET | Refills: 2 | Status: SHIPPED | OUTPATIENT
Start: 2018-10-16 | End: 2019-04-01

## 2018-10-19 NOTE — TELEPHONE ENCOUNTER
2nd attempt lm on vm to call clinic for message from PCP that he is due for a diabetes follow up appt

## 2018-11-15 ENCOUNTER — TELEPHONE (OUTPATIENT)
Dept: FAMILY MEDICINE | Facility: CLINIC | Age: 73
End: 2018-11-15

## 2018-11-21 DIAGNOSIS — N32.81 OVERACTIVE BLADDER: ICD-10-CM

## 2018-11-21 NOTE — TELEPHONE ENCOUNTER
"Requested Prescriptions   Pending Prescriptions Disp Refills     oxybutynin (DITROPAN-XL) 5 MG 24 hr tablet [Pharmacy Med Name: OXYBUTYNIN CHLORIDE ER 5MG TB24]  Last Written Prescription Date:  4/2/2107  Last Fill Quantity: 90 tablet,  # refills: 3   Last Office Visit: 9/4/2018   Future Office Visit:      90 tablet 3     Sig: TAKE ONE TABLET BY MOUTH EVERY DAY    Muscarinic Antagonists (Urinary Incontinence Agents) Passed    11/21/2018 12:30 PM       Passed - Recent (12 mo) or future (30 days) visit within the authorizing provider's specialty    Patient had office visit in the last 12 months or has a visit in the next 30 days with authorizing provider or within the authorizing provider's specialty.  See \"Patient Info\" tab in inbasket, or \"Choose Columns\" in Meds & Orders section of the refill encounter.           Passed - Medication is Oxybutynin and patient is 5 years of age or older       Passed - Patient does not have a diagnosis of glaucoma on the problem list    If glaucoma diagnosis is new, refer refill to physician.       Passed - Patient is 18 years of age or older          "

## 2018-11-24 NOTE — TELEPHONE ENCOUNTER
Routing refill request to provider for review/approval because:  --Appears that staff have been trying to get this patient in for a diabetes visit for some time.  --Last office visit was 2/2018.  --Many canceled office visits OR now shows since then.  --Vm and several letters have been sent.  --Seems to be a gap in taking this urinary incontinence medication.  --Do you want to deny?

## 2018-11-26 RX ORDER — OXYBUTYNIN CHLORIDE 5 MG/1
TABLET, EXTENDED RELEASE ORAL
Qty: 14 TABLET | Refills: 0 | Status: SHIPPED | OUTPATIENT
Start: 2018-11-26 | End: 2019-04-01

## 2018-12-27 ENCOUNTER — TELEPHONE (OUTPATIENT)
Dept: GASTROENTEROLOGY | Facility: OUTPATIENT CENTER | Age: 73
End: 2018-12-27

## 2018-12-27 DIAGNOSIS — Z12.11 ENCOUNTER FOR SCREENING COLONOSCOPY: Primary | ICD-10-CM

## 2018-12-27 NOTE — TELEPHONE ENCOUNTER
Patient taking any blood thinners ? 81 mg aspirin    Heart disease ? Denies     Lung disease ? Denies       Sleep apnea ? Denies     Diabetic ? Type 2    Kidney disease ? Denies     Dialysis ? N/a     Electronic implanted medical devices ? Denies     Are you taking any narcotic pain medication ?   No What is your daily dosage ?    PTSD ? N/a     Prep instructions reviewed with patient ? Instructions, policy,MAC sedation plan reviewed. Advised patient to have someone stay with them post exam.    Ph[facility]acy : [facility]    Indication for procedure : Screening colonoscopy    Referring provider : Self     Arrival Time : 9 AM

## 2019-01-04 ENCOUNTER — PATIENT OUTREACH (OUTPATIENT)
Dept: GERIATRIC MEDICINE | Facility: CLINIC | Age: 74
End: 2019-01-04

## 2019-01-07 NOTE — PROGRESS NOTES
[facility] Care Coordination Contact      [facility] Six-Month Telephone Assessment    6 month telephone assessment completed on January 4, 2019.    ER visits: No  Hospitalizations: No  TCU stays: No  Significant health status changes: None  Falls/Injuries: No  ADL/IADL changes: No  Changes in services: No    Caregiver Assessment follow up:  NA    Goals: See POC in chart for goal progress documentation.      Client reports that he had a colonoscopy. He doesn't want a home visit. He stated he has had no falls, ED Visits or Hospitalizations. He also stated that when he wins the lottery he will call for a visit.    Will call member in 6 months for an annual health risk assessment.   Encouraged member to call CC with any questions or concerns in the meantime.     Patricia Hayward RN, BSN, PHN  [facility] Care Coordinator  [phone]

## 2019-02-16 DIAGNOSIS — N40.1 BENIGN NON-NODULAR PROSTATIC HYPERPLASIA WITH LOWER URINARY TRACT SYMPTOMS: ICD-10-CM

## 2019-02-19 RX ORDER — TAMSULOSIN HYDROCHLORIDE 0.4 MG/1
CAPSULE ORAL
Qty: 90 CAPSULE | Refills: 3 | Status: SHIPPED | OUTPATIENT
Start: 2019-02-19 | End: 2020-04-16

## 2019-02-19 NOTE — TELEPHONE ENCOUNTER
"Requested Prescriptions   Pending Prescriptions Disp Refills     tamsulosin (FLOMAX) 0.4 MG capsule [Pharmacy Med Name: TAMSULOSIN HCL 0.4MG CAPS] 90 capsule 1     Sig: TAKE ONE CAPSULE BY MOUTH EVERY DAY    Alpha Blockers Failed - 2/16/2019 11:35 AM       Failed - Blood pressure under 140/90 in past 12 months    BP Readings from Last 3 Encounters:   02/05/18 137/78   08/22/17 105/65   03/31/17 135/74                Failed - Recent (12 mo) or future (30 days) visit within the authorizing provider's specialty    Patient had office visit in the last 12 months or has a visit in the next 30 days with authorizing provider or within the authorizing provider's specialty.  See \"Patient Info\" tab in inbasket, or \"Choose Columns\" in Meds & Orders section of the refill encounter.             Failed - Patient does not have Tadalafil, Vardenafil, or Sildenafil on their medication list       Passed - Medication is active on med list       Passed - Patient is 18 years of age or older          "

## 2019-03-29 ENCOUNTER — DOCUMENTATION ONLY (OUTPATIENT)
Dept: FAMILY MEDICINE | Facility: CLINIC | Age: 74
End: 2019-03-29

## 2019-03-29 NOTE — PROGRESS NOTES
FIT test kit sent by inter office mail to:    Reema Trujillo, [phone], [email or web address]   [address] [place] [id number]

## 2019-04-01 ENCOUNTER — OFFICE VISIT (OUTPATIENT)
Dept: FAMILY MEDICINE | Facility: CLINIC | Age: 74
End: 2019-04-01
Payer: COMMERCIAL

## 2019-04-01 VITALS
SYSTOLIC BLOOD PRESSURE: 118 MMHG | DIASTOLIC BLOOD PRESSURE: 64 MMHG | HEART RATE: 107 BPM | RESPIRATION RATE: 18 BRPM | BODY MASS INDEX: 27.77 KG/M2 | OXYGEN SATURATION: 98 % | HEIGHT: 72 IN | TEMPERATURE: 98.5 F | WEIGHT: 205 LBS

## 2019-04-01 DIAGNOSIS — I10 HYPERTENSION GOAL BP (BLOOD PRESSURE) < 140/90: ICD-10-CM

## 2019-04-01 DIAGNOSIS — N32.81 OVERACTIVE BLADDER: ICD-10-CM

## 2019-04-01 DIAGNOSIS — I10 ESSENTIAL HYPERTENSION WITH GOAL BLOOD PRESSURE LESS THAN 140/90: ICD-10-CM

## 2019-04-01 DIAGNOSIS — E11.9 TYPE 2 DIABETES MELLITUS WITHOUT COMPLICATION, WITHOUT LONG-TERM CURRENT USE OF INSULIN (H): ICD-10-CM

## 2019-04-01 DIAGNOSIS — E11.65 TYPE 2 DIABETES MELLITUS WITH HYPERGLYCEMIA, WITHOUT LONG-TERM CURRENT USE OF INSULIN (H): Primary | ICD-10-CM

## 2019-04-01 DIAGNOSIS — Z79.899 MEDICATION MANAGEMENT: ICD-10-CM

## 2019-04-01 DIAGNOSIS — E78.5 HYPERLIPIDEMIA WITH TARGET LDL LESS THAN 100: ICD-10-CM

## 2019-04-01 DIAGNOSIS — M25.561 ACUTE PAIN OF RIGHT KNEE: ICD-10-CM

## 2019-04-01 PROCEDURE — 99214 OFFICE O/P EST MOD 30 MIN: CPT | Performed by: FAMILY MEDICINE

## 2019-04-01 PROCEDURE — 82043 UR ALBUMIN QUANTITATIVE: CPT | Performed by: FAMILY MEDICINE

## 2019-04-01 RX ORDER — TRIAMTERENE AND HYDROCHLOROTHIAZIDE 37.5; 25 MG/1; MG/1
1 CAPSULE ORAL DAILY
Qty: 90 CAPSULE | Refills: 3 | Status: SHIPPED | OUTPATIENT
Start: 2019-04-01 | End: 2020-06-15

## 2019-04-01 RX ORDER — OXYBUTYNIN CHLORIDE 5 MG/1
5 TABLET, EXTENDED RELEASE ORAL DAILY
Qty: 14 TABLET | Refills: 0 | Status: SHIPPED | OUTPATIENT
Start: 2019-04-01 | End: 2023-07-14

## 2019-04-01 RX ORDER — SIMVASTATIN 20 MG
TABLET ORAL
Qty: 90 TABLET | Refills: 3 | Status: SHIPPED | OUTPATIENT
Start: 2019-04-01 | End: 2020-04-13

## 2019-04-01 RX ORDER — VERAPAMIL HYDROCHLORIDE 180 MG/1
180 TABLET, EXTENDED RELEASE ORAL DAILY
Qty: 90 TABLET | Refills: 3 | Status: SHIPPED | OUTPATIENT
Start: 2019-04-01 | End: 2020-06-15

## 2019-04-01 ASSESSMENT — MIFFLIN-ST. JEOR: SCORE: 1714.93

## 2019-04-01 NOTE — PATIENT INSTRUCTIONS
Will follow up Thursday for labs/right knee xray.     Desires diabetes nutrition education which I agree with.

## 2019-04-01 NOTE — PROGRESS NOTES
"  SUBJECTIVE:   David Gutierrez Jr. is a 73 year old male who presents to clinic today for the following health issues:  Diabetes Follow-up      Patient is checking blood sugars: not at all    Diabetic concerns: None and other - weakness on his knees      Symptoms of hypoglycemia (low blood sugar): none     Paresthesias (numbness or burning in feet) or sores: No     Date of last diabetic eye exam: 01/2019    BP Readings from Last 2 Encounters:   02/05/18 137/78   08/22/17 105/65     Hemoglobin A1C (%)   Date Value   02/05/2018 10.4 (H)   08/22/2017 9.2 (H)     LDL Cholesterol Calculated (mg/dL)   Date Value   02/05/2018 84   03/31/2017 87       Diabetes Management Resources    Amount of exercise or physical activity: walking    Problems taking medications regularly: No    Medication side effects: none    Diet: regular (no restrictions)         Type 2 diabetes mellitus with hyperglycemia, without long-term current use of insulin (H)  Hypertension goal BP (blood pressure) < 140/90  Hyperlipidemia with target LDL less than 100  Essential hypertension with goal blood pressure less than 140/90  Overactive bladder  Type 2 diabetes mellitus without complication, without long-term current use of insulin (H)  Acute pain of right knee: no major trauma, but aching with walking.  Wants to \"see if has arthritis in it.\"  Does ice and this does help.     Medication management :does not want to do labs today, in a hurry.      Feeling well otherwise.      Trying to keep active walking not checking blood sugars often but says \"has the stuff.\"         Problem list, Medication list, Allergies, and Medical/Social/Surgical histories reviewed in [facility] and updated as appropriate.  Labs reviewed in EPIC  BP Readings from Last 3 Encounters:   04/01/19 118/64   02/05/18 137/78   08/22/17 105/65    Wt Readings from Last 3 Encounters:   04/01/19 93 kg (205 lb)   02/05/18 91.6 kg (202 lb)   08/22/17 88.5 kg (195 lb)                  Patient " Active Problem List   Diagnosis     Hyperlipidemia with target LDL less than 100     Type 2 diabetes, HbA1c goal < 7% (H)     Hypertension goal BP (blood pressure) < 140/90     Advanced directives, counseling/discussion     Health Care Home     Type 2 diabetes mellitus with hyperglycemia (H)     Chronic gingivitis     Past Surgical History:   Procedure Laterality Date     SURGICAL HISTORY OF -   2003    thyroid nodule removal       Social History     Tobacco Use     Smoking status: Former Smoker     Last attempt to quit: 1992     Years since quittin.2     Smokeless tobacco: Never Used     Tobacco comment: social   Substance Use Topics     Alcohol use: Yes     Comment: rare     Family History   Problem Relation Age of Onset     Breast Cancer Mother          age 51     C.A.D. Father      Hypertension Father      Cancer - colorectal Father          age 72     C.A.D. Maternal Grandfather      Diabetes Brother         type 2     Diabetes Sister         type 2         Current Outpatient Medications   Medication Sig Dispense Refill     aspirin (ASA) 81 MG tablet Take 1 tablet (81 mg) by mouth daily 90 tablet 3     blood glucose monitor KIT 1 each daily. 1 kit 0     doxycycline (PERIOSTAT) 20 MG tablet Take 1 tablet (20 mg) by mouth daily 30 tablet 0     GARLIC 1 po qd       glucose blood VI test strips strip Use twice daily before meals and as needed for symptoms of low blood sugar. 3 Box 3     lancets thin MISC 1 Device 2 times daily (before meals) 180 each 0     lisinopril (PRINIVIL/ZESTRIL) 20 MG tablet TAKE ONE TABLET BY MOUTH EVERY DAY 90 tablet 2     metFORMIN (GLUCOPHAGE) 1000 MG tablet Take 1 tablet (1,000 mg) by mouth 2 times daily (with meals) 180 tablet 3     Milk Thistle 500 MG CAPS Take  by mouth every 48 hours.       Multiple Vitamin (DAILY MULTIVITAMIN PO) Take 1 tablet by mouth.       oxybutynin ER (DITROPAN-XL) 5 MG 24 hr tablet Take 1 tablet (5 mg) by mouth daily 14 tablet 0      "pioglitazone (ACTOS) 45 MG tablet Take 1 tablet (45 mg) by mouth daily 90 tablet 3     sildenafil (VIAGRA) 50 MG tablet Take 0.5-1 tablets (25-50 mg) by mouth daily as needed for erectile dysfunction Take 30 min to 4 hours before intercourse.  Never use with nitroglycerin, terazosin or doxazosin. 12 tablet 3     simvastatin (ZOCOR) 20 MG tablet TAKE ONE TABLET BY MOUTH AT BEDTIME 90 tablet 3     tamsulosin (FLOMAX) 0.4 MG capsule TAKE ONE CAPSULE BY MOUTH EVERY DAY 90 capsule 3     triamterene-HCTZ (DYAZIDE) 37.5-25 MG capsule Take 1 capsule by mouth daily 90 capsule 3     verapamil ER (CALAN-SR) 180 MG CR tablet Take 1 tablet (180 mg) by mouth daily 90 tablet 3     Allergies   Allergen Reactions     No Known Drug Allergies      Recent Labs   Lab Test 02/05/18  1114 08/22/17  1025 03/31/17  1252 07/29/16  1147   A1C 10.4* 9.2* 13.4* 10.3*   LDL 84  --  87 65   HDL 39*  --  34* 30*   TRIG 97  --  115 84   ALT 23  --  20 23   CR 1.06  --  1.17 1.07   GFRESTIMATED 69  --  62 68   GFRESTBLACK 83  --  75 83   POTASSIUM 4.1  --  3.8 4.0   TSH 1.38  --   --  1.10        ROS:  Constitutional, HEENT, cardiovascular, pulmonary, GI, , musculoskeletal, neuro, skin, endocrine and psych systems are negative, except as otherwise noted.        OBJECTIVE:  /64   Pulse 107   Temp 98.5  F (36.9  C) (Oral)   Resp 18   Ht 1.816 m (5' 11.5\")   Wt 93 kg (205 lb)   SpO2 98%   BMI 28.19 kg/m      EXAM:  GENERAL APPEARANCE: healthy, alert and no distress      ASSESSMENT AND PLAN  Patient Instructions   Will follow up Thursday for labs/right knee xray. (xray not working today)  Endorses being compliant withtaking aspirin, lisinopril, metformin, actos, simvastatin as in med list along with the other active medications .       I highly recommended advancing diabetes therapy with either insulin or GLP 1 analog however he does not want to do this.     I attempted to discuss with him that in fact his diabetes has NOT been well " controlled although he seems feel it has been.     Reviewed goal blood sugars  fasting and less than 160 after meals.     He did not want to change medications but was actually interested in working with a nutritionist on his diet.      Desires diabetes nutrition education which I agree with and gave him referral.     We will update plan further after labs return.     ASSESSMENT AND PLAN  1. Type 2 diabetes mellitus with hyperglycemia, without long-term current use of insulin (H)    - metFORMIN (GLUCOPHAGE) 1000 MG tablet; Take 1 tablet (1,000 mg) by mouth 2 times daily (with meals)  Dispense: 180 tablet; Refill: 3  - DIABETES EDUCATOR REFERRAL  - Comprehensive metabolic panel; Future  - Albumin Random Urine Quantitative with Creat Ratio; Future  - Hemoglobin A1c; Future  - TSH with free T4 reflex; Future  - Albumin Random Urine Quantitative with Creat Ratio    2. Hypertension goal BP (blood pressure) < 140/90    - verapamil ER (CALAN-SR) 180 MG CR tablet; Take 1 tablet (180 mg) by mouth daily  Dispense: 90 tablet; Refill: 3  - Comprehensive metabolic panel; Future  - Albumin Random Urine Quantitative with Creat Ratio; Future  - Albumin Random Urine Quantitative with Creat Ratio    3. Hyperlipidemia with target LDL less than 100    - simvastatin (ZOCOR) 20 MG tablet; TAKE ONE TABLET BY MOUTH AT BEDTIME  Dispense: 90 tablet; Refill: 3  - Lipid panel reflex to direct LDL Fasting; Future    4. Essential hypertension with goal blood pressure less than 140/90    - triamterene-HCTZ (DYAZIDE) 37.5-25 MG capsule; Take 1 capsule by mouth daily  Dispense: 90 capsule; Refill: 3    5. Overactive bladder    - oxybutynin ER (DITROPAN-XL) 5 MG 24 hr tablet; Take 1 tablet (5 mg) by mouth daily  Dispense: 14 tablet; Refill: 0    6. Type 2 diabetes mellitus without complication, without long-term current use of insulin (H)    - aspirin (ASA) 81 MG tablet; Take 1 tablet (81 mg) by mouth daily  Dispense: 90 tablet; Refill:  3    7. Acute pain of right knee    - XR Knee Right 3 Views; Future    8. Medication management    - CBC with platelets; Future                Joel Wegener, MD

## 2019-04-01 NOTE — LETTER
[place] [id number] [place] [id number] [phone]        August 12, 2019    David Gutierrez Jr.  [facility] [place] [address]              Dear David Gutierrez Jr.    This is to remind you that your fasting lab is due.    You may call our office at [phone] to schedule an appointment.    Please disregard this notice if you have already had your labs drawn or made an appointment.        Sincerely,        Joel Daniel Irwin Wegener MD

## 2019-04-02 LAB
CREAT UR-MCNC: 208 MG/DL
MICROALBUMIN UR-MCNC: 239 MG/L
MICROALBUMIN/CREAT UR: 114.9 MG/G CR (ref 0–17)

## 2019-05-03 ENCOUNTER — PATIENT OUTREACH (OUTPATIENT)
Dept: GERIATRIC MEDICINE | Facility: CLINIC | Age: 74
End: 2019-05-03

## 2019-05-03 NOTE — LETTER
[address], [address]  [place] [address]  Phone:  [phone]  Fax:  [phone]        May 8, 2019    ANDRADE ARANDA JR.  [address]  [place] [address]    Dear Andrade,    My name is Patricia Hayward RN, BSN, PHN and I am your [facility] Care Coordinator.  You have indicated that you are not interested in meeting with me in person to complete a Health Risk Assessment.     As your care coordinator, I work in collaboration with your primary physician and clinic.  We also want you to be aware of the health plan initiatives that are available to you, free of charge.     Currently, the health plan is supporting the pneumonia vaccine and prescription ordered calcium and vitamin D supplements.  Please call me for more details, my number is listed below.    [facility] encourages all members to contact their care coordinator if they have had any change in condition, a recent emergency room visit or hospital stay.    Thank you,    Patricia Hayward RN, BSN, PHN    E-mail: [email or web address]   Phone: [phone]      [facility]

## 2019-05-08 NOTE — PROGRESS NOTES
[facility] Care Coordination Contact      [facility] Refusal Telephone Assessment    Member refused home visit HRA on May 3, 2019 (reason: Doesn't feel he needs a visit).    ER visits: No  Hospitalizations: No  Health concerns: None  Falls/Injuries: No  ADL/IADL Dependencies: Unknown        Member currently receiving the following home care services:   None  Member currently receiving the following community resources:    Informal support(s):  None    Advanced Care Planning discussion, complete code section.    [facility] Health Plan sponsored benefits: Shared information re: Silver Sneakers/gym memberships, ASA, Calcium +D.    Follow-Up Plan: Member informed of future contact, plan to f/u with member with a 6 month telephone assessment and offer a home visit.  Contact information shared with member and family, encouraged member to call with any questions or concerns at any time.    Requested CMS to mail refusal letter.    Patricia Hayward RN, BSN, PHN  [facility] Care Coordinator  [phone]

## 2019-05-08 NOTE — PROGRESS NOTES
"[facility] Care Coordination Contact    Per CC, mailed client a \"Refusal of Home Visit\" letter.      Mercy Hughes  Care Management Specialist  [facility]  [phone]        "

## 2019-06-03 DIAGNOSIS — E11.65 TYPE 2 DIABETES MELLITUS WITH HYPERGLYCEMIA, WITHOUT LONG-TERM CURRENT USE OF INSULIN (H): ICD-10-CM

## 2019-06-03 NOTE — LETTER
[address] [address]  Phone: [phone]    06/06/19    David Gutierrez Jr.  [address] [place] [address]      To whom it may concern:     In order to ensure we are providing the best quality care, we have reviewed your chart and see that you are due for a fasting lab appointment.    We have also sent your pioglitazone (ACTOS) 45 MG tablet medication to your pharmacy. For future medication refills, please contact your primary care clinic to schedule a lab appointment. This can be requested via [facility] [employer] or by calling the clinic at [phone].    We greatly appreciate the opportunity to serve you. Thank you for trusting us with your health care.      Sincerely,      Your care team at [facility]

## 2019-06-04 NOTE — TELEPHONE ENCOUNTER
"Requested Prescriptions   Pending Prescriptions Disp Refills     pioglitazone (ACTOS) 45 MG tablet [Pharmacy Med Name: PIOGLITAZONE HCL 45MG TABS] 90 tablet 0     Sig: TAKE 1 TABLET BY MOUTH DAILY  Last Written Prescription Date:  5/15/2018  Last Fill Quantity: 90 tablet,  # refills: 3   Last Office Visit: 4/1/2019   Future Office Visit:            Thiazolidinedione Agents (TZDs)  Failed - 6/3/2019  5:42 PM        Failed - Patient has documented LDL within the past 12 mos.     Recent Labs   Lab Test 02/05/18  1114   LDL 84           Failed - Patient has a normal ALT within the past 12 mos.     Recent Labs   Lab Test 02/05/18  1114   ALT 23           Failed - Patient has a normal AST within the past 12 mos.      Recent Labs   Lab Test 02/05/18  1114   AST 15           Failed - Patient has documented A1c within the specified period of time.     If HgbA1C is 8 or greater, it needs to be on file within the past 3 months.  If less than 8, must be on file within the past 6 months.     Recent Labs   Lab Test 02/05/18  1114   A1C 10.4*           Failed - Patient has a normal serum Creatinine in the past 12 months     Recent Labs   Lab Test 02/05/18  1114   CR 1.06           Passed - Blood pressure less than 140/90 in past 6 months     BP Readings from Last 3 Encounters:   04/01/19 118/64   02/05/18 137/78   08/22/17 105/65           Passed - Patient has had a Microalbumin in the past 12 mos.     Recent Labs   Lab Test 04/01/19  1129   MICROL 239   UMALCR 114.90*           Passed - Diagnosis not CHF        Passed - Medication is active on med list        Passed - Patient is age 18 or older        Passed - Recent (6 mo) or future (30 days) visit within the authorizing provider's specialty     Patient had office visit in the last 6 months or has a visit in the next 30 days with authorizing provider or within the authorizing provider's specialty.  See \"Patient Info\" tab in inbasket, or \"Choose Columns\" in Meds & Orders section " of the refill encounter.

## 2019-06-06 RX ORDER — PIOGLITAZONEHYDROCHLORIDE 45 MG/1
TABLET ORAL
Qty: 90 TABLET | Refills: 0 | Status: SHIPPED | OUTPATIENT
Start: 2019-06-06 | End: 2019-09-17

## 2019-06-06 NOTE — TELEPHONE ENCOUNTER
Left message on answering machine that medication was approved and sent to pharmacy and that a fasting lab only appointment must be scheduled prior to additional refills given.     Lyndsay Antoine

## 2019-07-05 DIAGNOSIS — I10 ESSENTIAL HYPERTENSION WITH GOAL BLOOD PRESSURE LESS THAN 140/90: ICD-10-CM

## 2019-07-07 RX ORDER — LISINOPRIL 20 MG/1
TABLET ORAL
Qty: 30 TABLET | Refills: 0 | Status: SHIPPED | OUTPATIENT
Start: 2019-07-07 | End: 2019-09-12

## 2019-07-07 NOTE — TELEPHONE ENCOUNTER
"Requested Prescriptions   Pending Prescriptions Disp Refills     lisinopril (PRINIVIL/ZESTRIL) 20 MG tablet [Pharmacy Med Name: LISINOPRIL 20MG TABS] 90 tablet 2     Sig: TAKE ONE TABLET BY MOUTH EVERY DAY       ACE Inhibitors (Including Combos) Protocol Failed - 7/5/2019  5:36 PM        Failed - Normal serum creatinine on file in past 12 months     Recent Labs   Lab Test 02/05/18  1114   CR 1.06             Failed - Normal serum potassium on file in past 12 months     Recent Labs   Lab Test 02/05/18  1114   POTASSIUM 4.1             Passed - Blood pressure under 140/90 in past 12 months     BP Readings from Last 3 Encounters:   04/01/19 118/64   02/05/18 137/78   08/22/17 105/65                 Passed - Recent (12 mo) or future (30 days) visit within the authorizing provider's specialty     Patient had office visit in the last 12 months or has a visit in the next 30 days with authorizing provider or within the authorizing provider's specialty.  See \"Patient Info\" tab in inbasket, or \"Choose Columns\" in Meds & Orders section of the refill encounter.              Passed - Medication is active on med list        Passed - Patient is age 18 or older          Medication is being filled for 1 time refill only due to:  Patient needs labs BMP. Future labs ordered BMP.     Signed Prescriptions:                        Disp   Refills    lisinopril (PRINIVIL/ZESTRIL) 20 MG tablet 30 tab*0        Sig: TAKE ONE TABLET BY MOUTH EVERY DAY  Authorizing Provider: WEGENER, JOEL DANIEL IRWIN  Ordering User: ORION STEVEN Reception - one month due for lab work please    "

## 2019-09-12 DIAGNOSIS — I10 ESSENTIAL HYPERTENSION WITH GOAL BLOOD PRESSURE LESS THAN 140/90: ICD-10-CM

## 2019-09-12 NOTE — TELEPHONE ENCOUNTER
"Requested Prescriptions   Pending Prescriptions Disp Refills     lisinopril (PRINIVIL/ZESTRIL) 20 MG tablet [Pharmacy Med Name: LISINOPRIL 20MG TABS] 30 tablet 0     Sig: TAKE ONE TABLET BY MOUTH ONCE DAILY, PATIENT NEEDS LABS FOR FURTHER REFILLS  Last Written Prescription Date:  7/7/2019  Last Fill Quantity: 30 tablet,  # refills: 0   Last Office Visit: 4/1/2019   Future Office Visit:            ACE Inhibitors (Including Combos) Protocol Failed - 9/12/2019 12:31 PM        Failed - Normal serum creatinine on file in past 12 months     Recent Labs   Lab Test 02/05/18  1114   CR 1.06           Failed - Normal serum potassium on file in past 12 months     Recent Labs   Lab Test 02/05/18  1114   POTASSIUM 4.1           Passed - Blood pressure under 140/90 in past 12 months     BP Readings from Last 3 Encounters:   04/01/19 118/64   02/05/18 137/78   08/22/17 105/65           Passed - Recent (12 mo) or future (30 days) visit within the authorizing provider's specialty     Patient had office visit in the last 12 months or has a visit in the next 30 days with authorizing provider or within the authorizing provider's specialty.  See \"Patient Info\" tab in inbasket, or \"Choose Columns\" in Meds & Orders section of the refill encounter.            Passed - Medication is active on med list        Passed - Patient is age 18 or older          "

## 2019-09-15 NOTE — TELEPHONE ENCOUNTER
Routing refill request to provider for review/approval because:  Joyce given x1 and patient did not follow up, please advise  Labs not current:     Return in about 3 months (around 7/1/2019) for diabetes

## 2019-09-16 RX ORDER — LISINOPRIL 20 MG/1
TABLET ORAL
Qty: 15 TABLET | Refills: 0 | Status: SHIPPED | OUTPATIENT
Start: 2019-09-16 | End: 2019-11-16

## 2019-09-16 NOTE — TELEPHONE ENCOUNTER
Call pt. No lab monitoring for over 18 months. It is essential he follow up with me for diabetes/lab visit.   Joel Wegener,MD

## 2019-09-17 ENCOUNTER — DOCUMENTATION ONLY (OUTPATIENT)
Dept: FAMILY MEDICINE | Facility: CLINIC | Age: 74
End: 2019-09-17

## 2019-09-17 DIAGNOSIS — E11.65 TYPE 2 DIABETES MELLITUS WITH HYPERGLYCEMIA, WITHOUT LONG-TERM CURRENT USE OF INSULIN (H): ICD-10-CM

## 2019-09-17 RX ORDER — PIOGLITAZONEHYDROCHLORIDE 45 MG/1
45 TABLET ORAL DAILY
Qty: 90 TABLET | Refills: 0 | Status: SHIPPED | OUTPATIENT
Start: 2019-09-17 | End: 2020-01-01

## 2019-09-17 NOTE — TELEPHONE ENCOUNTER
Routing refill request to provider for review/approval because:  Labs out of range:   a1c and all other labs are over 1.5 year old.  RAHEL Pimentel

## 2019-09-17 NOTE — TELEPHONE ENCOUNTER
Reason for Call:  Medication or medication refill:    Do you use a [facility]?  Name of the pharmacy and phone number for the current request:  [facility] - [phone]    Name of the medication requested: pioglitazone    Other request: patient said's he is completely out of this medication patient has a diabetes appointment scheduled for Monday 9-23-19    Can we leave a detailed message on this number? YES    Phone number patient can be reached at: Home number on file [phone] (home)    Best Time: any    Call taken on 9/17/2019 at 9:49 AM by Salima Adams

## 2019-09-17 NOTE — TELEPHONE ENCOUNTER
Requested Prescriptions   Pending Prescriptions Disp Refills     pioglitazone (ACTOS) 45 MG tablet 90 tablet 0     Sig: Take 1 tablet (45 mg) by mouth daily  Last Written Prescription Date:  6/6/2019  Last Fill Quantity: 90 tablet,  # refills: 0   Last Office Visit: 4/1/2019   Future Office Visit:    Next 5 appointments (look out 90 days)    Sep 23, 2019  7:40 AM CDT  SHORT with Joel Daniel Irwin Wegener, MD  [facility] ([facility]) [address] [place] [id number] [phone]              Thiazolidinedione Agents (TZDs)  Failed - 9/17/2019  9:50 AM        Failed - Patient has documented LDL within the past 12 mos.     Recent Labs   Lab Test 02/05/18  1114   LDL 84           Failed - Patient has a normal ALT within the past 12 mos.     Recent Labs   Lab Test 02/05/18  1114   ALT 23           Failed - Patient has a normal AST within the past 12 mos.      Recent Labs   Lab Test 02/05/18  1114   AST 15           Failed - Patient has documented A1c within the specified period of time.     If HgbA1C is 8 or greater, it needs to be on file within the past 3 months.  If less than 8, must be on file within the past 6 months.     Recent Labs   Lab Test 02/05/18  1114   A1C 10.4*           Failed - Patient has a normal serum Creatinine in the past 12 months     Recent Labs   Lab Test 02/05/18  1114   CR 1.06           Passed - Blood pressure less than 140/90 in past 6 months     BP Readings from Last 3 Encounters:   04/01/19 118/64   02/05/18 137/78   08/22/17 105/65           Passed - Patient has had a Microalbumin in the past 12 mos.     Recent Labs   Lab Test 04/01/19  1129   MICROL 239   UMALCR 114.90*           Passed - Diagnosis not CHF        Passed - Medication is active on med list        Passed - Patient is age 18 or older        Passed - Recent (6 mo) or future (30 days) visit within the authorizing provider's specialty     Patient had office visit in the last 6  "months or has a visit in the next 30 days with authorizing provider or within the authorizing provider's specialty.  See \"Patient Info\" tab in inbasket, or \"Choose Columns\" in Meds & Orders section of the refill encounter.              "

## 2019-09-17 NOTE — PROGRESS NOTES
Labs on 19 have  and letter was sent on 19.    Please close (not just done) this encounter if nothing more needs to be done with it.    Thanks,  lab

## 2019-09-23 DIAGNOSIS — I10 HYPERTENSION GOAL BP (BLOOD PRESSURE) < 140/90: ICD-10-CM

## 2019-09-23 DIAGNOSIS — E78.5 HYPERLIPIDEMIA WITH TARGET LDL LESS THAN 100: ICD-10-CM

## 2019-09-23 DIAGNOSIS — E11.65 TYPE 2 DIABETES MELLITUS WITH HYPERGLYCEMIA, WITHOUT LONG-TERM CURRENT USE OF INSULIN (H): ICD-10-CM

## 2019-09-23 DIAGNOSIS — I10 ESSENTIAL HYPERTENSION WITH GOAL BLOOD PRESSURE LESS THAN 140/90: ICD-10-CM

## 2019-09-23 DIAGNOSIS — Z79.899 MEDICATION MANAGEMENT: ICD-10-CM

## 2019-09-23 LAB
ALBUMIN SERPL-MCNC: 3.8 G/DL (ref 3.4–5)
ALP SERPL-CCNC: 105 U/L (ref 40–150)
ALT SERPL W P-5'-P-CCNC: 20 U/L (ref 0–70)
ANION GAP SERPL CALCULATED.3IONS-SCNC: 6 MMOL/L (ref 3–14)
AST SERPL W P-5'-P-CCNC: 12 U/L (ref 0–45)
BILIRUB SERPL-MCNC: 0.5 MG/DL (ref 0.2–1.3)
BUN SERPL-MCNC: 25 MG/DL (ref 7–30)
CALCIUM SERPL-MCNC: 9.1 MG/DL (ref 8.5–10.1)
CHLORIDE SERPL-SCNC: 108 MMOL/L (ref 94–109)
CHOLEST SERPL-MCNC: 168 MG/DL
CO2 SERPL-SCNC: 25 MMOL/L (ref 20–32)
CREAT SERPL-MCNC: 1.18 MG/DL (ref 0.66–1.25)
ERYTHROCYTE [DISTWIDTH] IN BLOOD BY AUTOMATED COUNT: 14.9 % (ref 10–15)
GFR SERPL CREATININE-BSD FRML MDRD: 61 ML/MIN/{1.73_M2}
GLUCOSE SERPL-MCNC: 141 MG/DL (ref 70–99)
HBA1C MFR BLD: 7.5 % (ref 0–5.6)
HCT VFR BLD AUTO: 42 % (ref 40–53)
HDLC SERPL-MCNC: 38 MG/DL
HGB BLD-MCNC: 13.5 G/DL (ref 13.3–17.7)
LDLC SERPL CALC-MCNC: 105 MG/DL
MCH RBC QN AUTO: 29.5 PG (ref 26.5–33)
MCHC RBC AUTO-ENTMCNC: 32.1 G/DL (ref 31.5–36.5)
MCV RBC AUTO: 92 FL (ref 78–100)
NONHDLC SERPL-MCNC: 130 MG/DL
PLATELET # BLD AUTO: 288 10E9/L (ref 150–450)
POTASSIUM SERPL-SCNC: 3.8 MMOL/L (ref 3.4–5.3)
PROT SERPL-MCNC: 7.6 G/DL (ref 6.8–8.8)
RBC # BLD AUTO: 4.58 10E12/L (ref 4.4–5.9)
SODIUM SERPL-SCNC: 139 MMOL/L (ref 133–144)
TRIGL SERPL-MCNC: 123 MG/DL
TSH SERPL DL<=0.005 MIU/L-ACNC: 2.24 MU/L (ref 0.4–4)
WBC # BLD AUTO: 7 10E9/L (ref 4–11)

## 2019-09-23 PROCEDURE — 80053 COMPREHEN METABOLIC PANEL: CPT | Performed by: FAMILY MEDICINE

## 2019-09-23 PROCEDURE — 80061 LIPID PANEL: CPT | Performed by: FAMILY MEDICINE

## 2019-09-23 PROCEDURE — 36415 COLL VENOUS BLD VENIPUNCTURE: CPT | Performed by: FAMILY MEDICINE

## 2019-09-23 PROCEDURE — 85027 COMPLETE CBC AUTOMATED: CPT | Performed by: FAMILY MEDICINE

## 2019-09-23 PROCEDURE — 84443 ASSAY THYROID STIM HORMONE: CPT | Performed by: FAMILY MEDICINE

## 2019-09-23 PROCEDURE — 83036 HEMOGLOBIN GLYCOSYLATED A1C: CPT | Performed by: FAMILY MEDICINE

## 2019-11-16 DIAGNOSIS — I10 ESSENTIAL HYPERTENSION WITH GOAL BLOOD PRESSURE LESS THAN 140/90: ICD-10-CM

## 2019-11-16 RX ORDER — LISINOPRIL 20 MG/1
TABLET ORAL
Qty: 15 TABLET | Refills: 0 | Status: CANCELLED | OUTPATIENT
Start: 2019-11-16

## 2019-11-17 RX ORDER — LISINOPRIL 20 MG/1
TABLET ORAL
Qty: 90 TABLET | Refills: 1 | Status: SHIPPED | OUTPATIENT
Start: 2019-11-16 | End: 2020-08-11

## 2019-11-17 NOTE — TELEPHONE ENCOUNTER
"Requested Prescriptions   Pending Prescriptions Disp Refills     lisinopril (PRINIVIL/ZESTRIL) 20 MG tablet [Pharmacy Med Name: LISINOPRIL 20MG TABS] 90 tablet 2     Sig: TAKE ONE TABLET BY MOUTH EVERY DAY         Last Written Prescription Date:  9/16/19  Last Fill Quantity: 15,   # refills: 0  Last Office Visit: 4/1/19  Future Office visit:             ACE Inhibitors (Including Combos) Protocol Passed - 11/16/2019 11:55 AM        Passed - Blood pressure under 140/90 in past 12 months     BP Readings from Last 3 Encounters:   04/01/19 118/64   02/05/18 137/78   08/22/17 105/65                 Passed - Recent (12 mo) or future (30 days) visit within the authorizing provider's specialty     Patient has had an office visit with the authorizing provider or a provider within the authorizing providers department within the previous 12 mos or has a future within next 30 days. See \"Patient Info\" tab in inbasket, or \"Choose Columns\" in Meds & Orders section of the refill encounter.              Passed - Medication is active on med list        Passed - Patient is age 18 or older        Passed - Normal serum creatinine on file in past 12 months     Recent Labs   Lab Test 09/23/19  0839   CR 1.18             Passed - Normal serum potassium on file in past 12 months     Recent Labs   Lab Test 09/23/19  0839   POTASSIUM 3.8             Signed Prescriptions:                        Disp   Refills    lisinopril (PRINIVIL/ZESTRIL) 20 MG tablet 90 tab*1        Sig: TAKE ONE TABLET BY MOUTH EVERY DAY  Authorizing Provider: WEGENER, JOEL DANIEL IRWIN  Ordering User: ORION STEVEN      "

## 2019-12-03 ENCOUNTER — PATIENT OUTREACH (OUTPATIENT)
Dept: GERIATRIC MEDICINE | Facility: CLINIC | Age: 74
End: 2019-12-03

## 2019-12-03 NOTE — PROGRESS NOTES
[facility] Care Coordination Contact      [facility] Six-Month Telephone Assessment    6 month telephone assessment completed on December 3, 2019.    ER visits: No  Hospitalizations: No  TCU stays: No  Significant health status changes: None  Falls/Injuries: No  ADL/IADL changes: No  Changes in services: No    Caregiver Assessment follow up:  NA    Goals: See POC in chart for goal progress documentation.      Member reports he is doing fine and doesn't want a home visit.    Will call member in 6 months for an annual health risk assessment.   Encouraged member to call CC with any questions or concerns in the meantime.     Patricia Hayward RN, BSN, PHN  [facility] Care Coordinator  [phone]

## 2019-12-27 DIAGNOSIS — E11.65 TYPE 2 DIABETES MELLITUS WITH HYPERGLYCEMIA, WITHOUT LONG-TERM CURRENT USE OF INSULIN (H): ICD-10-CM

## 2019-12-30 NOTE — TELEPHONE ENCOUNTER
"Requested Prescriptions   Pending Prescriptions Disp Refills     pioglitazone (ACTOS) 45 MG tablet [Pharmacy Med Name: PIOGLITAZONE HCL 45MG TABS] 90 tablet 0     Sig: TAKE ONE TABLET BY MOUTH ONCE DAILY  Last Written Prescription Date:  9/17/2019  Last Fill Quantity: 90 tablet,  # refills: 0   Last Office Visit: 4/1/2019   Future Office Visit:            Thiazolidinedione Agents (TZDs)  Failed - 12/27/2019  5:39 PM        Failed - Blood pressure less than 140/90 in past 6 months     BP Readings from Last 3 Encounters:   04/01/19 118/64   02/05/18 137/78   08/22/17 105/65           Failed - Recent (6 mo) or future (30 days) visit within the authorizing provider's specialty     Patient had office visit in the last 6 months or has a visit in the next 30 days with authorizing provider or within the authorizing provider's specialty.  See \"Patient Info\" tab in inbasket, or \"Choose Columns\" in Meds & Orders section of the refill encounter.            Passed - Patient has documented LDL within the past 12 mos.     Recent Labs   Lab Test 09/23/19  0839   *           Passed - Patient has a normal ALT within the past 12 mos.     Recent Labs   Lab Test 09/23/19  0839   ALT 20           Passed - Patient has a normal AST within the past 12 mos.      Recent Labs   Lab Test 09/23/19  0839   AST 12           Passed - Patient has had a Microalbumin in the past 12 mos.     Recent Labs   Lab Test 04/01/19  1129   MICROL 239   UMALCR 114.90*           Passed - Patient has documented A1c within the specified period of time.     If HgbA1C is 8 or greater, it needs to be on file within the past 3 months.  If less than 8, must be on file within the past 6 months.     Recent Labs   Lab Test 09/23/19  0839   A1C 7.5*           Passed - Diagnosis not CHF        Passed - Medication is active on med list        Passed - Patient is age 18 or older        Passed - Patient has a normal serum Creatinine in the past 12 months     Recent Labs "   Lab Test 09/23/19  0839   CR 1.18

## 2020-01-01 RX ORDER — PIOGLITAZONEHYDROCHLORIDE 45 MG/1
TABLET ORAL
Qty: 30 TABLET | Refills: 0 | Status: SHIPPED | OUTPATIENT
Start: 2020-01-01 | End: 2020-02-04

## 2020-01-01 NOTE — TELEPHONE ENCOUNTER
,  --Please contact patient and ask him to schedule a diabetes visit with Wegener.    --A refill order for below medication was sent to the pharmacy.        --Plan in last office visit 4/1/19 per Wegener was to C 3 months for diabetes visit.

## 2020-02-03 DIAGNOSIS — E11.65 TYPE 2 DIABETES MELLITUS WITH HYPERGLYCEMIA, WITHOUT LONG-TERM CURRENT USE OF INSULIN (H): ICD-10-CM

## 2020-02-03 NOTE — TELEPHONE ENCOUNTER
"Requested Prescriptions   Pending Prescriptions Disp Refills     pioglitazone (ACTOS) 45 MG tablet [Pharmacy Med Name: PIOGLITAZONE HCL 45MG TABS] 30 tablet 0     Sig: TAKE ONE TABLET BY MOUTH ONCE DAILY . (NEED TO BE SEEN IN CLINIC FOR FURTHER REFILLS)  Last Written Prescription Date:  1/1/2020  Last Fill Quantity: 30 tablet,  # refills: 0   Last Office Visit: 4/1/2019   Future Office Visit:            Thiazolidinedione Agents (TZDs)  Failed - 2/3/2020  8:01 AM        Failed - Blood pressure less than 140/90 in past 6 months     BP Readings from Last 3 Encounters:   04/01/19 118/64   02/05/18 137/78   08/22/17 105/65           Failed - Recent (6 mo) or future (30 days) visit within the authorizing provider's specialty     Patient had office visit in the last 6 months or has a visit in the next 30 days with authorizing provider or within the authorizing provider's specialty.  See \"Patient Info\" tab in inbasket, or \"Choose Columns\" in Meds & Orders section of the refill encounter.            Passed - Patient has documented LDL within the past 12 mos.     Recent Labs   Lab Test 09/23/19  0839   *           Passed - Patient has a normal ALT within the past 12 mos.     Recent Labs   Lab Test 09/23/19  0839   ALT 20           Passed - Patient has a normal AST within the past 12 mos.      Recent Labs   Lab Test 09/23/19  0839   AST 12           Passed - Patient has had a Microalbumin in the past 12 mos.     Recent Labs   Lab Test 04/01/19  1129   MICROL 239   UMALCR 114.90*           Passed - Patient has documented A1c within the specified period of time.     If HgbA1C is 8 or greater, it needs to be on file within the past 3 months.  If less than 8, must be on file within the past 6 months.     Recent Labs   Lab Test 09/23/19  0839   A1C 7.5*           Passed - Diagnosis not CHF        Passed - Medication is active on med list        Passed - Patient is age 18 or older        Passed - Patient has a normal serum " Creatinine in the past 12 months     Recent Labs   Lab Test 09/23/19  0839   CR 1.18

## 2020-02-04 NOTE — TELEPHONE ENCOUNTER
Routing refill request to provider for review/approval because:  Joyce given x1 and patient did not follow up, please advise      HW Reception - please encourage office visit for future refills

## 2020-02-05 RX ORDER — PIOGLITAZONEHYDROCHLORIDE 45 MG/1
TABLET ORAL
Qty: 30 TABLET | Refills: 0 | Status: SHIPPED | OUTPATIENT
Start: 2020-02-05 | End: 2020-03-18

## 2020-02-12 ENCOUNTER — PATIENT OUTREACH (OUTPATIENT)
Dept: GERIATRIC MEDICINE | Facility: CLINIC | Age: 75
End: 2020-02-12

## 2020-02-12 NOTE — PROGRESS NOTES
[facility] Care Coordination Contact    TM from member asking if his employer can pay his premium. TC to member informing him he will need to speak to the county, his financial worker Supa Merino. He stated he sent in the paperwork 11 times and wasn't going to work with his worker. Member then hung up.  Patricia Hayward, RN, BSN, PHN  [facility] Care Coordinator  [phone]

## 2020-02-12 NOTE — PROGRESS NOTES
[facility] Care Coordination Contact    TC to member to discuss MA closure on 11/30/19 ([employer] closure 2/29/20). He stated that he sent in the information but the county took money from him and he is going to appeal. He was very upset and stated they discriminated against him. TC to [facility]. They told CC that the [facility] spoke with David on 2/4/20 and were going to re-send out the combined application form. TM left for member informing him that the [facility] was sending this information out and he should get it to them as soon as possible.  Patricia Hayward, RN, BSN, PHN  [facility] Coordinator  [phone]

## 2020-03-16 DIAGNOSIS — E11.65 TYPE 2 DIABETES MELLITUS WITH HYPERGLYCEMIA, WITHOUT LONG-TERM CURRENT USE OF INSULIN (H): ICD-10-CM

## 2020-03-17 NOTE — TELEPHONE ENCOUNTER
"Requested Prescriptions   Pending Prescriptions Disp Refills     pioglitazone (ACTOS) 45 MG tablet [Pharmacy Med Name: PIOGLITAZONE HCL 45MG TABS] 30 tablet 0     Sig: TAKE ONE TABLET BY MOUTH ONCE DAILY (NEED TO BE SEEN IN CLINIC FOR FURTHER REFILLS)  Last Written Prescription Date:  2/5/2020  Last Fill Quantity: 30 tablet,  # refills: 0   Last Office Visit: 4/1/2019   Future Office Visit:            Thiazolidinedione Agents (TZDs)  Failed - 3/16/2020  3:56 PM        Failed - Blood pressure less than 140/90 in past 6 months     BP Readings from Last 3 Encounters:   04/01/19 118/64   02/05/18 137/78   08/22/17 105/65           Failed - Recent (6 mo) or future (30 days) visit within the authorizing provider's specialty     Patient had office visit in the last 6 months or has a visit in the next 30 days with authorizing provider or within the authorizing provider's specialty.  See \"Patient Info\" tab in inbasket, or \"Choose Columns\" in Meds & Orders section of the refill encounter.            Passed - Patient has documented LDL within the past 12 mos.     Recent Labs   Lab Test 09/23/19  0839   *           Passed - Patient has a normal ALT within the past 12 mos.     Recent Labs   Lab Test 09/23/19  0839   ALT 20           Passed - Patient has a normal AST within the past 12 mos.      Recent Labs   Lab Test 09/23/19  0839   AST 12           Passed - Patient has had a Microalbumin in the past 12 mos.     Recent Labs   Lab Test 04/01/19  1129   MICROL 239   UMALCR 114.90*           Passed - Patient has documented A1c within the specified period of time.     If HgbA1C is 8 or greater, it needs to be on file within the past 3 months.  If less than 8, must be on file within the past 6 months.     Recent Labs   Lab Test 09/23/19  0839   A1C 7.5*           Passed - Diagnosis not CHF        Passed - Medication is active on med list        Passed - Patient is age 18 or older        Passed - Patient has a normal serum " Creatinine in the past 12 months     Recent Labs   Lab Test 09/23/19  0839   CR 1.18       Ok to refill medication if creatinine is low

## 2020-03-17 NOTE — TELEPHONE ENCOUNTER
"Requested Prescriptions   Pending Prescriptions Disp Refills     metFORMIN (GLUCOPHAGE) 1000 MG tablet 180 tablet 3     Sig: Take 1 tablet (1,000 mg) by mouth 2 times daily (with meals)  Last Written Prescription Date:  4/1/2019  Last Fill Quantity: 180 tablet,  # refills: 3   Last Office Visit: 4/1/2019   Future Office Visit:            Biguanide Agents Failed - 3/16/2020  5:27 PM        Failed - Blood pressure less than 140/90 in past 6 months     BP Readings from Last 3 Encounters:   04/01/19 118/64   02/05/18 137/78   08/22/17 105/65           Failed - Recent (6 mo) or future (30 days) visit within the authorizing provider's specialty     Patient had office visit in the last 6 months or has a visit in the next 30 days with authorizing provider or within the authorizing provider's specialty.  See \"Patient Info\" tab in inbasket, or \"Choose Columns\" in Meds & Orders section of the refill encounter.            Passed - Patient has documented LDL within the past 12 mos.     Recent Labs   Lab Test 09/23/19  0839   *           Passed - Patient has had a Microalbumin in the past 15 mos.     Recent Labs   Lab Test 04/01/19  1129   MICROL 239   UMALCR 114.90*           Passed - Patient is age 10 or older        Passed - Patient has documented A1c within the specified period of time.     If HgbA1C is 8 or greater, it needs to be on file within the past 3 months.  If less than 8, must be on file within the past 6 months.     Recent Labs   Lab Test 09/23/19  0839   A1C 7.5*           Passed - Patient's CR is NOT>1.4 OR Patient's EGFR is NOT<45 within past 12 mos.     Recent Labs   Lab Test 09/23/19  0839   GFRESTIMATED 61   GFRESTBLACK 71     Recent Labs   Lab Test 09/23/19  0839   CR 1.18           Passed - Patient does NOT have a diagnosis of CHF.        Passed - Medication is active on med list             "

## 2020-03-18 RX ORDER — PIOGLITAZONEHYDROCHLORIDE 45 MG/1
TABLET ORAL
Qty: 90 TABLET | Refills: 1 | Status: SHIPPED | OUTPATIENT
Start: 2020-03-18 | End: 2021-04-23

## 2020-04-14 DIAGNOSIS — N40.1 BENIGN NON-NODULAR PROSTATIC HYPERPLASIA WITH LOWER URINARY TRACT SYMPTOMS: ICD-10-CM

## 2020-04-14 NOTE — TELEPHONE ENCOUNTER
"Requested Prescriptions   Pending Prescriptions Disp Refills     tamsulosin (FLOMAX) 0.4 MG capsule [Pharmacy Med Name: TAMSULOSIN HCL 0.4MG CAPS] 90 capsule 3     Sig: TAKE ONE CAPSULE BY MOUTH EVERY DAY  Last Written Prescription Date:  2/19/2019  Last Fill Quantity: 90 capsule,  # refills: 3   Last Office Visit: 4/1/2019   Future Office Visit:            Alpha Blockers Failed - 4/14/2020  3:26 PM        Failed - Blood pressure under 140/90 in past 12 months     BP Readings from Last 3 Encounters:   04/01/19 118/64   02/05/18 137/78   08/22/17 105/65           Failed - Recent (12 mo) or future (30 days) visit within the authorizing provider's specialty     Patient has had an office visit with the authorizing provider or a provider within the authorizing providers department within the previous 12 mos or has a future within next 30 days. See \"Patient Info\" tab in inbasket, or \"Choose Columns\" in Meds & Orders section of the refill encounter.            Failed - Patient does not have Tadalafil, Vardenafil, or Sildenafil on their medication list        Passed - Medication is active on med list        Passed - Patient is 18 years of age or older             "

## 2020-04-16 RX ORDER — TAMSULOSIN HYDROCHLORIDE 0.4 MG/1
CAPSULE ORAL
Qty: 90 CAPSULE | Refills: 3 | Status: SHIPPED | OUTPATIENT
Start: 2020-04-16 | End: 2021-10-12

## 2020-04-16 NOTE — TELEPHONE ENCOUNTER
Routing refill request to provider for review/approval because:  Patient needs to be seen because it has been more than 1 year since last office visit.

## 2020-06-11 ENCOUNTER — PATIENT OUTREACH (OUTPATIENT)
Dept: GERIATRIC MEDICINE | Facility: CLINIC | Age: 75
End: 2020-06-11

## 2020-06-11 NOTE — PROGRESS NOTES
[facility] Care Coordination Contact    No longer active with [facility] case management effective 2/29/2020. Reason for community disenrollment: VALENTE Gaspar.  Patricia Hayward, RN, BSN, PHN  [facility] Care Coordinator  [phone]

## 2020-06-12 DIAGNOSIS — I10 ESSENTIAL HYPERTENSION WITH GOAL BLOOD PRESSURE LESS THAN 140/90: ICD-10-CM

## 2020-06-12 DIAGNOSIS — E78.5 HYPERLIPIDEMIA WITH TARGET LDL LESS THAN 100: ICD-10-CM

## 2020-06-12 DIAGNOSIS — I10 HYPERTENSION GOAL BP (BLOOD PRESSURE) < 140/90: ICD-10-CM

## 2020-06-15 RX ORDER — VERAPAMIL HYDROCHLORIDE 180 MG/1
TABLET, EXTENDED RELEASE ORAL
Qty: 90 TABLET | Refills: 0 | Status: SHIPPED | OUTPATIENT
Start: 2020-06-15 | End: 2020-08-11

## 2020-06-15 RX ORDER — TRIAMTERENE AND HYDROCHLOROTHIAZIDE 37.5; 25 MG/1; MG/1
CAPSULE ORAL
Qty: 90 CAPSULE | Refills: 0 | Status: SHIPPED | OUTPATIENT
Start: 2020-06-15 | End: 2020-08-11

## 2020-06-15 RX ORDER — SIMVASTATIN 20 MG
TABLET ORAL
Qty: 90 TABLET | Refills: 0 | Status: SHIPPED | OUTPATIENT
Start: 2020-06-15 | End: 2021-07-19

## 2020-06-15 NOTE — TELEPHONE ENCOUNTER
Very overdue for multiple follow up.  Schedule virtual annual wellness/diabetes follow up visit.  If declines and only will do in-person visit then schedule this as in-person visit please.     Joel Wegener,MD

## 2020-06-15 NOTE — TELEPHONE ENCOUNTER
.Routing refill request to provider for review/approval because:  Patient needs to be seen because it has been more than 1 year since last office visit.  Lab Results   Component Value Date    A1C 7.5 09/23/2019    A1C 10.4 02/05/2018    A1C 9.2 08/22/2017    A1C 13.4 03/31/2017    A1C 10.3 07/29/2016

## 2020-08-01 DIAGNOSIS — I10 HYPERTENSION GOAL BP (BLOOD PRESSURE) < 140/90: ICD-10-CM

## 2020-08-03 RX ORDER — VERAPAMIL HYDROCHLORIDE 180 MG/1
TABLET, EXTENDED RELEASE ORAL
Qty: 90 TABLET | Refills: 0 | OUTPATIENT
Start: 2020-08-03

## 2020-08-07 DIAGNOSIS — I10 ESSENTIAL HYPERTENSION WITH GOAL BLOOD PRESSURE LESS THAN 140/90: ICD-10-CM

## 2020-08-07 DIAGNOSIS — I10 HYPERTENSION GOAL BP (BLOOD PRESSURE) < 140/90: ICD-10-CM

## 2020-08-11 RX ORDER — TRIAMTERENE AND HYDROCHLOROTHIAZIDE 37.5; 25 MG/1; MG/1
CAPSULE ORAL
Qty: 90 CAPSULE | Refills: 0 | Status: SHIPPED | OUTPATIENT
Start: 2020-08-11 | End: 2020-11-11

## 2020-08-11 RX ORDER — LISINOPRIL 20 MG/1
TABLET ORAL
Qty: 90 TABLET | Refills: 0 | Status: SHIPPED | OUTPATIENT
Start: 2020-08-11 | End: 2020-11-11

## 2020-08-11 RX ORDER — VERAPAMIL HYDROCHLORIDE 180 MG/1
180 TABLET, EXTENDED RELEASE ORAL DAILY
Qty: 90 TABLET | Refills: 0 | Status: SHIPPED | OUTPATIENT
Start: 2020-08-11 | End: 2020-11-11

## 2020-08-18 ENCOUNTER — OFFICE VISIT (OUTPATIENT)
Dept: FAMILY MEDICINE | Facility: CLINIC | Age: 75
End: 2020-08-18

## 2020-08-18 VITALS
OXYGEN SATURATION: 98 % | HEART RATE: 110 BPM | SYSTOLIC BLOOD PRESSURE: 112 MMHG | BODY MASS INDEX: 27.71 KG/M2 | DIASTOLIC BLOOD PRESSURE: 62 MMHG | RESPIRATION RATE: 16 BRPM | HEIGHT: 72 IN | WEIGHT: 204.6 LBS | TEMPERATURE: 97.5 F

## 2020-08-18 DIAGNOSIS — Z12.11 SPECIAL SCREENING FOR MALIGNANT NEOPLASMS, COLON: ICD-10-CM

## 2020-08-18 DIAGNOSIS — E11.9 TYPE 2 DIABETES, HBA1C GOAL < 7% (H): ICD-10-CM

## 2020-08-18 DIAGNOSIS — I10 HYPERTENSION GOAL BP (BLOOD PRESSURE) < 140/90: ICD-10-CM

## 2020-08-18 DIAGNOSIS — E11.65 TYPE 2 DIABETES MELLITUS WITH HYPERGLYCEMIA, WITHOUT LONG-TERM CURRENT USE OF INSULIN (H): ICD-10-CM

## 2020-08-18 DIAGNOSIS — E78.5 HYPERLIPIDEMIA WITH TARGET LDL LESS THAN 100: ICD-10-CM

## 2020-08-18 DIAGNOSIS — Z00.00 ENCOUNTER FOR MEDICARE ANNUAL WELLNESS EXAM: Primary | ICD-10-CM

## 2020-08-18 LAB
ALBUMIN SERPL-MCNC: 3.7 G/DL (ref 3.4–5)
ALP SERPL-CCNC: 110 U/L (ref 40–150)
ALT SERPL W P-5'-P-CCNC: 19 U/L (ref 0–70)
ANION GAP SERPL CALCULATED.3IONS-SCNC: 7 MMOL/L (ref 3–14)
AST SERPL W P-5'-P-CCNC: 11 U/L (ref 0–45)
BILIRUB SERPL-MCNC: 0.7 MG/DL (ref 0.2–1.3)
BUN SERPL-MCNC: 18 MG/DL (ref 7–30)
CALCIUM SERPL-MCNC: 8.8 MG/DL (ref 8.5–10.1)
CHLORIDE SERPL-SCNC: 105 MMOL/L (ref 94–109)
CHOLEST SERPL-MCNC: 171 MG/DL
CO2 SERPL-SCNC: 25 MMOL/L (ref 20–32)
CREAT SERPL-MCNC: 1.13 MG/DL (ref 0.66–1.25)
CREAT UR-MCNC: 122 MG/DL
GFR SERPL CREATININE-BSD FRML MDRD: 64 ML/MIN/{1.73_M2}
GLUCOSE SERPL-MCNC: 166 MG/DL (ref 70–99)
HBA1C MFR BLD: 9 % (ref 0–5.6)
HDLC SERPL-MCNC: 34 MG/DL
LDLC SERPL CALC-MCNC: 105 MG/DL
MICROALBUMIN UR-MCNC: 76 MG/L
MICROALBUMIN/CREAT UR: 62.21 MG/G CR (ref 0–17)
NONHDLC SERPL-MCNC: 137 MG/DL
POTASSIUM SERPL-SCNC: 4 MMOL/L (ref 3.4–5.3)
PROT SERPL-MCNC: 7.9 G/DL (ref 6.8–8.8)
PSA SERPL-ACNC: 4.87 UG/L (ref 0–4)
SODIUM SERPL-SCNC: 137 MMOL/L (ref 133–144)
TRIGL SERPL-MCNC: 158 MG/DL

## 2020-08-18 PROCEDURE — 83036 HEMOGLOBIN GLYCOSYLATED A1C: CPT | Performed by: FAMILY MEDICINE

## 2020-08-18 PROCEDURE — 80053 COMPREHEN METABOLIC PANEL: CPT | Performed by: FAMILY MEDICINE

## 2020-08-18 PROCEDURE — 80061 LIPID PANEL: CPT | Performed by: FAMILY MEDICINE

## 2020-08-18 PROCEDURE — 99207 ZZC NO BILLABLE SERVICE THIS VISIT: CPT | Performed by: FAMILY MEDICINE

## 2020-08-18 PROCEDURE — 82043 UR ALBUMIN QUANTITATIVE: CPT | Performed by: FAMILY MEDICINE

## 2020-08-18 PROCEDURE — G0103 PSA SCREENING: HCPCS | Performed by: FAMILY MEDICINE

## 2020-08-18 PROCEDURE — 36415 COLL VENOUS BLD VENIPUNCTURE: CPT | Performed by: FAMILY MEDICINE

## 2020-08-18 ASSESSMENT — MIFFLIN-ST. JEOR: SCORE: 1703.12

## 2020-08-18 NOTE — PATIENT INSTRUCTIONS
Patient Education   Personalized Prevention Plan  You are due for the preventive services outlined below.  Your care team is available to assist you in scheduling these services.  If you have already completed any of these items, please share that information with your care team to update in your medical record.  Health Maintenance Due   Topic Date Due     Hepatitis B Vaccine (1 of 3 - Risk 3-dose series) [birth year]05/1964     Zoster (Shingles) Vaccine (2 of 3) 09/24/2009     Eye Exam  06/12/2016     Discuss Advance Care Planning  03/23/2017     Diabetic Foot Exam  03/31/2018     Colorectal Cancer Screening  04/12/2018     Annual Wellness Visit  02/05/2019     FALL RISK ASSESSMENT  02/05/2019     A1C Lab  12/23/2019     PHQ-2  01/01/2020     Kidney Microalbumin Urine Test  04/01/2020

## 2020-08-18 NOTE — PROGRESS NOTES
"  SUBJECTIVE:   David Gutierrez Jr. is a 75 year old male who presents for Preventive Visit.      Are you in the first 12 months of your Medicare Part B coverage?  No    Physical Health:    In general, how would you rate your overall physical health? good    Outside of work, how many days during the week do you exercise? 6-7 days/week    Outside of work, approximately how many minutes a day do you exercise?15-30 minutes    If you drink alcohol do you typically have >3 drinks per day or >7 drinks per week? No    Do you usually eat at least 4 servings of fruit and vegetables a day, include whole grains & fiber and avoid regularly eating high fat or \"junk\" foods? Yes    Do you have any problems taking medications regularly?  YEs-sometimes forget to take in the AM    Do you have any side effects from medications? none    Needs assistance for the following daily activities: no assistance needed    Which of the following safety concerns are present in your home?  none identified     Hearing impairment: No    In the past 6 months, have you been bothered by leaking of urine? no    Mental Health:    In general, how would you rate your overall mental or emotional health? good  PHQ-2 Score:      Do you feel safe in your environment? Yes    Have you ever done Advance Care Planning? (For example, a Health Directive, POLST, or a discussion with a medical provider or your loved ones about your wishes): No, advance care planning information given to patient to review.  Advanced care planning was discussed at today's visit.    Additional concerns to address?  No    Fall risk:  Fallen 2 or more times in the past year?: No  Any fall with injury in the past year?: No    Cognitive Screenin) Repeat 3 items (Leader, Season, Table)    2) Clock draw: NORMAL  3) 3 item recall: Recalls 1 object   Results: NORMAL clock, 1-2 items recalled: COGNITIVE IMPAIRMENT LESS LIKELY    Mini-CogTM Copyright S Love. Licensed by the author for use " in [facility]; reprinted with permission (somaritza@[facility]). All rights reserved.      Do you have sleep apnea, excessive snoring or daytime drowsiness?: yes        Diabetes Follow-up      How often are you checking your blood sugar? Not at all    What concerns do you have today about your diabetes? None     Do you have any of these symptoms? (Select all that apply)  Numbness in feet and Burning in feet    Have you had a diabetic eye exam in the last 12 months? No, but going to this week.        BP Readings from Last 2 Encounters:   19 118/64   18 137/78     Hemoglobin A1C (%)   Date Value   2019 7.5 (H)   2018 10.4 (H)     LDL Cholesterol Calculated (mg/dL)   Date Value   2019 105 (H)   2018 84           Reviewed and updated as needed this visit by clinical staff         Reviewed and updated as needed this visit by Provider        Social History     Tobacco Use     Smoking status: Former Smoker     Last attempt to quit: 1992     Years since quittin.6     Smokeless tobacco: Never Used     Tobacco comment: social   Substance Use Topics     Alcohol use: Yes     Comment: rare                           Current providers sharing in care for this patient include:   Patient Care Team:  Wegener, Joel Daniel Irwin, MD as PCP - General (Family Practice)  Wegener, Joel Daniel Irwin, MD as Assigned PCP    The following health maintenance items are reviewed in Epic and correct as of today:  Health Maintenance   Topic Date Due     HEPATITIS B IMMUNIZATION (1 of 3 - Risk 3-dose series) 1966     ZOSTER IMMUNIZATION (2 of 3) 2009     EYE EXAM  2016     ADVANCE CARE PLANNING  2017     DIABETIC FOOT EXAM  2018     COLORECTAL CANCER SCREENING  2018     MEDICARE ANNUAL WELLNESS VISIT  2019     FALL RISK ASSESSMENT  2019     A1C  2019     PHQ-2  2020     MICROALBUMIN  2020     INFLUENZA VACCINE (1) 2020     BMP   "09/23/2020     LIPID  09/23/2020     DTAP/TDAP/TD IMMUNIZATION (3 - Td) 02/05/2028     HEPATITIS C SCREENING  Completed     PNEUMOCOCCAL IMMUNIZATION 65+ LOW/MEDIUM RISK  Completed     AORTIC ANEURYSM SCREENING (SYSTEM ASSIGNED)  Completed     IPV IMMUNIZATION  Aged Out     MENINGITIS IMMUNIZATION  Aged Out           ROS:      OBJECTIVE:   There were no vitals taken for this visit. Estimated body mass index is 28.19 kg/m  as calculated from the following:    Height as of 4/1/19: 1.816 m (5' 11.5\").    Weight as of 4/1/19: 93 kg (205 lb).  EXAM:           ASSESSMENT / PLAN:    unfortunately I was running behind and sanket left before being seen.  I will reach out to re-schedule and send report/plan with lab follow up.     COUNSELING:      Estimated body mass index is 28.19 kg/m  as calculated from the following:    Height as of 4/1/19: 1.816 m (5' 11.5\").    Weight as of 4/1/19: 93 kg (205 lb).         reports that he quit smoking about 28 years ago. He has never used smokeless tobacco.      Appropriate preventive services were discussed with this patient, including applicable screening as appropriate for cardiovascular disease, diabetes, osteopenia/osteoporosis, and glaucoma.  As appropriate for age/gender, discussed screening for colorectal cancer, prostate cancer, breast cancer, and cervical cancer. Checklist reviewing preventive services available has been given to the patient.      Counseling Resources:  ATP IV Guidelines  Pooled Cohorts Equation Calculator  Breast Cancer Risk Calculator  FRAX Risk Assessment  ICSI Preventive Guidelines  Dietary Guidelines for Americans, 2010  USDA's MyPlate  ASA Prophylaxis  Lung CA Screening    Joel Daniel Wegener, MD  [facility]  " [No Acute Distress] : no acute distress [Alert] : alert [EOMI Bilateral] : EOMI bilateral [Normocephalic] : normocephalic [Clear tympanic membranes with bony landmarks and light reflex present bilaterally] : clear tympanic membranes with bony landmarks and light reflex present bilaterally  [Nonerythematous Oropharynx] : nonerythematous oropharynx [Pink Nasal Mucosa] : pink nasal mucosa [Supple, full passive range of motion] : supple, full passive range of motion [No Palpable Masses] : no palpable masses [Regular Rate and Rhythm] : regular rate and rhythm [Clear to Auscultation Bilaterally] : clear to auscultation bilaterally [Normal S1, S2 audible] : normal S1, S2 audible [+2 Femoral Pulses] : +2 femoral pulses [No Murmurs] : no murmurs [Soft] : soft [Non Distended] : non distended [NonTender] : non tender [Normoactive Bowel Sounds] : normoactive bowel sounds [No Abnormal Lymph Nodes Palpated] : no abnormal lymph nodes palpated [No Hepatomegaly] : no hepatomegaly [No Splenomegaly] : no splenomegaly [No pain or deformities with palpation of bone, muscles, joints] : no pain or deformities with palpation of bone, muscles, joints [No Gait Asymmetry] : no gait asymmetry [Normal Muscle Tone] : normal muscle tone [+2 Patella DTR] : +2 patella DTR [Cranial Nerves Grossly Intact] : cranial nerves grossly intact [Straight] : straight [No Rash or Lesions] : no rash or lesions

## 2021-05-25 ENCOUNTER — RECORDS - HEALTHEAST (OUTPATIENT)
Dept: ADMINISTRATIVE | Facility: CLINIC | Age: 76
End: 2021-05-25

## 2021-06-01 ENCOUNTER — TRANSFERRED RECORDS (OUTPATIENT)
Dept: HEALTH INFORMATION MANAGEMENT | Facility: CLINIC | Age: 76
End: 2021-06-01

## 2021-06-01 LAB — RETINOPATHY: NORMAL

## 2021-11-18 DIAGNOSIS — N40.1 BENIGN NON-NODULAR PROSTATIC HYPERPLASIA WITH LOWER URINARY TRACT SYMPTOMS: ICD-10-CM

## 2021-11-18 DIAGNOSIS — I10 ESSENTIAL HYPERTENSION WITH GOAL BLOOD PRESSURE LESS THAN 140/90: ICD-10-CM

## 2021-11-18 DIAGNOSIS — E11.65 TYPE 2 DIABETES MELLITUS WITH HYPERGLYCEMIA, WITHOUT LONG-TERM CURRENT USE OF INSULIN (H): ICD-10-CM

## 2021-11-18 DIAGNOSIS — E78.5 HYPERLIPIDEMIA WITH TARGET LDL LESS THAN 100: ICD-10-CM

## 2021-11-19 NOTE — TELEPHONE ENCOUNTER
One month supply sent for meds 10/12/21  Due for physical  Last 8/18/2020    Attempt to call patient x 2 - line busy.  Will try to call later  Maria Elena Linder RN

## 2021-11-24 RX ORDER — SIMVASTATIN 20 MG
TABLET ORAL
Qty: 90 TABLET | Refills: 3 | Status: SHIPPED | OUTPATIENT
Start: 2021-11-24 | End: 2022-11-21

## 2021-11-24 RX ORDER — PIOGLITAZONEHYDROCHLORIDE 45 MG/1
TABLET ORAL
Qty: 90 TABLET | Refills: 1 | Status: SHIPPED | OUTPATIENT
Start: 2021-11-24 | End: 2022-07-08

## 2021-11-24 RX ORDER — TAMSULOSIN HYDROCHLORIDE 0.4 MG/1
CAPSULE ORAL
Qty: 30 CAPSULE | Refills: 0 | Status: SHIPPED | OUTPATIENT
Start: 2021-11-24 | End: 2022-01-24

## 2021-11-24 RX ORDER — LISINOPRIL 20 MG/1
TABLET ORAL
Qty: 30 TABLET | Refills: 0 | Status: SHIPPED | OUTPATIENT
Start: 2021-11-24 | End: 2022-07-11

## 2021-11-24 NOTE — TELEPHONE ENCOUNTER
DAVID,  Unable to reach pt   See below messages  No response from patient to our outreach  Please advise on further refill  Thanks,  Marli BEASLEY RN

## 2021-12-28 DIAGNOSIS — I10 HYPERTENSION GOAL BP (BLOOD PRESSURE) < 140/90: ICD-10-CM

## 2021-12-28 DIAGNOSIS — I10 ESSENTIAL HYPERTENSION WITH GOAL BLOOD PRESSURE LESS THAN 140/90: ICD-10-CM

## 2021-12-28 NOTE — LETTER
[facility] [address] [place] [address]  Phone: [phone]  December 29, 2021      David Gutierrez Jr.  [address] [place] [address]      Dear David,    We care about your health and have reviewed your health plan including your medical conditions, medications, and lab results.  Based on this review, it is recommended that you follow up regarding the following health topic(s):  -Wellness (Physical) Visit     We recommend you take the following action(s):  -schedule a WELLNESS (Physical) APPOINTMENT.  We will perform the following labs: Lipids (fasting cholesterol - nothing to eat except water and/or meds for 8-10 hours), CMP(complete metabolic panel), TSH (thyroid test) and CBC (complete blood cell counts).     Please call us at [phone] (or use [employer]) to address the above recommendations.     Please schedule to prevent any delays in refills of your prescriptions. Thank you for trusting [facility] and we appreciate the opportunity to serve you.  We look forward to supporting your healthcare needs in the future.    Healthy Regards,    Your Health Care Team  [facility]

## 2021-12-29 RX ORDER — VERAPAMIL HYDROCHLORIDE 180 MG/1
180 TABLET, EXTENDED RELEASE ORAL DAILY
Qty: 30 TABLET | Refills: 0 | Status: SHIPPED | OUTPATIENT
Start: 2021-12-29 | End: 2022-01-31

## 2021-12-29 RX ORDER — TRIAMTERENE AND HYDROCHLOROTHIAZIDE 37.5; 25 MG/1; MG/1
CAPSULE ORAL
Qty: 30 CAPSULE | Refills: 0 | Status: SHIPPED | OUTPATIENT
Start: 2021-12-29 | End: 2022-01-31

## 2021-12-29 NOTE — TELEPHONE ENCOUNTER
Called patient but phone number on file is no good.    Sent letter to schedule appointment for physical.    Sent in one month refill.     Thank you,  Bekah Saleem RN  [facility]

## 2022-01-31 DIAGNOSIS — I10 HYPERTENSION GOAL BP (BLOOD PRESSURE) < 140/90: ICD-10-CM

## 2022-01-31 DIAGNOSIS — I10 ESSENTIAL HYPERTENSION WITH GOAL BLOOD PRESSURE LESS THAN 140/90: ICD-10-CM

## 2022-01-31 RX ORDER — TRIAMTERENE AND HYDROCHLOROTHIAZIDE 37.5; 25 MG/1; MG/1
1 CAPSULE ORAL DAILY
Qty: 30 CAPSULE | Refills: 0 | Status: SHIPPED | OUTPATIENT
Start: 2022-01-31 | End: 2022-03-08

## 2022-01-31 RX ORDER — VERAPAMIL HYDROCHLORIDE 180 MG/1
180 TABLET, EXTENDED RELEASE ORAL DAILY
Qty: 30 TABLET | Refills: 0 | Status: SHIPPED | OUTPATIENT
Start: 2022-01-31 | End: 2022-03-08

## 2022-01-31 NOTE — TELEPHONE ENCOUNTER
Prescription approved per [facility] Refill Protocol.  Upcoming appt with new PCP  Marli BEASLEY RN

## 2022-03-07 DIAGNOSIS — I10 ESSENTIAL HYPERTENSION WITH GOAL BLOOD PRESSURE LESS THAN 140/90: ICD-10-CM

## 2022-03-07 DIAGNOSIS — I10 HYPERTENSION GOAL BP (BLOOD PRESSURE) < 140/90: ICD-10-CM

## 2022-03-08 RX ORDER — VERAPAMIL HYDROCHLORIDE 180 MG/1
180 TABLET, EXTENDED RELEASE ORAL DAILY
Qty: 30 TABLET | Refills: 0 | Status: SHIPPED | OUTPATIENT
Start: 2022-03-08 | End: 2022-03-28

## 2022-03-08 RX ORDER — TRIAMTERENE AND HYDROCHLOROTHIAZIDE 37.5; 25 MG/1; MG/1
1 CAPSULE ORAL DAILY
Qty: 30 CAPSULE | Refills: 0 | Status: SHIPPED | OUTPATIENT
Start: 2022-03-08 | End: 2022-06-01

## 2022-03-08 NOTE — TELEPHONE ENCOUNTER
Medication is being filled for 1 time refill only due to:  Patient needs to be seen because due for visit, labs.     Blood pressure under 140/90 in past 12 months    Normal serum creatinine on file in past 12 months    Normal serum potassium on file in past 12 months    Normal serum sodium on file in past 12 months         Normal ALT in past 12 months     Next 5 appointments (look out 90 days)    Mar 23, 2022 10:00 AM  (Arrive by 9:40 AM)  Adult Preventative Visit with DANIEL Arita CNP  [facility] ([facility] ) [address] [place] [address] [phone]

## 2022-03-29 NOTE — PROGRESS NOTES
David Gutierrez Jr. has an upcoming lab appointment:    Future Appointments   Date Time Provider Department Center   3/30/2022 10:15 AM [facility]     Patient is scheduled for the following lab(s): Patient on lab schedule for 3/30/2022    There is no order available. Please review and place either future orders or HMPO (Review of Health Maintenance Protocol Orders), as appropriate.    Health Maintenance Due   Topic     ANNUAL REVIEW OF HM ORDERS      A1C      BMP      LIPID      MICROALBUMIN      Silvana Ramires

## 2022-05-28 DIAGNOSIS — N40.1 BENIGN NON-NODULAR PROSTATIC HYPERPLASIA WITH LOWER URINARY TRACT SYMPTOMS: ICD-10-CM

## 2022-06-03 RX ORDER — TAMSULOSIN HYDROCHLORIDE 0.4 MG/1
0.4 CAPSULE ORAL DAILY
Qty: 30 CAPSULE | Refills: 0 | Status: SHIPPED | OUTPATIENT
Start: 2022-06-03 | End: 2022-07-08

## 2022-06-03 NOTE — TELEPHONE ENCOUNTER
JW,   Patient has future appt with [facility] provider  However has no shown 3x to that same provider  Please advise  Marli BEASLEY RN

## 2022-06-07 ENCOUNTER — TELEPHONE (OUTPATIENT)
Dept: FAMILY MEDICINE | Facility: CLINIC | Age: 77
End: 2022-06-07
Payer: COMMERCIAL

## 2022-06-07 NOTE — TELEPHONE ENCOUNTER
Yoanna is calling to get a refill of tamsulosin for patient. Informed her that a refill was sent to the [facility] on 6/3/22. She will let the patient know to go to the pharmacy to pick it up.    Felicitas Keyes RN

## 2022-07-07 DIAGNOSIS — I10 ESSENTIAL HYPERTENSION WITH GOAL BLOOD PRESSURE LESS THAN 140/90: ICD-10-CM

## 2022-07-08 DIAGNOSIS — E11.65 TYPE 2 DIABETES MELLITUS WITH HYPERGLYCEMIA, WITHOUT LONG-TERM CURRENT USE OF INSULIN (H): ICD-10-CM

## 2022-07-08 DIAGNOSIS — N40.1 BENIGN NON-NODULAR PROSTATIC HYPERPLASIA WITH LOWER URINARY TRACT SYMPTOMS: ICD-10-CM

## 2022-07-08 DIAGNOSIS — I10 HYPERTENSION GOAL BP (BLOOD PRESSURE) < 140/90: ICD-10-CM

## 2022-07-08 RX ORDER — VERAPAMIL HYDROCHLORIDE 180 MG/1
TABLET, EXTENDED RELEASE ORAL
Qty: 30 TABLET | Refills: 0 | Status: SHIPPED | OUTPATIENT
Start: 2022-07-08 | End: 2022-07-11

## 2022-07-08 RX ORDER — PIOGLITAZONEHYDROCHLORIDE 45 MG/1
TABLET ORAL
Qty: 30 TABLET | Refills: 0 | Status: SHIPPED | OUTPATIENT
Start: 2022-07-08 | End: 2022-08-09

## 2022-07-08 RX ORDER — TAMSULOSIN HYDROCHLORIDE 0.4 MG/1
CAPSULE ORAL
Qty: 30 CAPSULE | Refills: 0 | Status: SHIPPED | OUTPATIENT
Start: 2022-07-08 | End: 2022-08-09

## 2022-07-11 ENCOUNTER — OFFICE VISIT (OUTPATIENT)
Dept: FAMILY MEDICINE | Facility: CLINIC | Age: 77
End: 2022-07-11

## 2022-07-11 VITALS
HEIGHT: 71 IN | DIASTOLIC BLOOD PRESSURE: 77 MMHG | SYSTOLIC BLOOD PRESSURE: 156 MMHG | BODY MASS INDEX: 30.52 KG/M2 | WEIGHT: 218 LBS | HEART RATE: 104 BPM | RESPIRATION RATE: 16 BRPM | TEMPERATURE: 98.4 F | OXYGEN SATURATION: 99 %

## 2022-07-11 DIAGNOSIS — R41.89 IMPAIRED COGNITION: ICD-10-CM

## 2022-07-11 DIAGNOSIS — E11.65 TYPE 2 DIABETES MELLITUS WITH HYPERGLYCEMIA, WITHOUT LONG-TERM CURRENT USE OF INSULIN (H): Primary | ICD-10-CM

## 2022-07-11 DIAGNOSIS — I10 ESSENTIAL HYPERTENSION WITH GOAL BLOOD PRESSURE LESS THAN 140/90: ICD-10-CM

## 2022-07-11 DIAGNOSIS — N18.2 CHRONIC KIDNEY DISEASE, STAGE 2 (MILD): ICD-10-CM

## 2022-07-11 DIAGNOSIS — I10 HYPERTENSION GOAL BP (BLOOD PRESSURE) < 140/90: ICD-10-CM

## 2022-07-11 DIAGNOSIS — Z13.220 SCREENING FOR HYPERLIPIDEMIA: ICD-10-CM

## 2022-07-11 LAB — HBA1C MFR BLD: 9.4 % (ref 0–5.6)

## 2022-07-11 PROCEDURE — 83036 HEMOGLOBIN GLYCOSYLATED A1C: CPT | Performed by: NURSE PRACTITIONER

## 2022-07-11 PROCEDURE — 36415 COLL VENOUS BLD VENIPUNCTURE: CPT | Performed by: NURSE PRACTITIONER

## 2022-07-11 PROCEDURE — 82043 UR ALBUMIN QUANTITATIVE: CPT | Performed by: NURSE PRACTITIONER

## 2022-07-11 PROCEDURE — 80061 LIPID PANEL: CPT | Performed by: NURSE PRACTITIONER

## 2022-07-11 PROCEDURE — 80048 BASIC METABOLIC PNL TOTAL CA: CPT | Performed by: NURSE PRACTITIONER

## 2022-07-11 PROCEDURE — 99214 OFFICE O/P EST MOD 30 MIN: CPT | Performed by: NURSE PRACTITIONER

## 2022-07-11 RX ORDER — TRIAMTERENE AND HYDROCHLOROTHIAZIDE 37.5; 25 MG/1; MG/1
1 CAPSULE ORAL DAILY
Qty: 14 CAPSULE | Refills: 0 | Status: SHIPPED | OUTPATIENT
Start: 2022-07-11 | End: 2022-11-21

## 2022-07-11 RX ORDER — TRIAMTERENE AND HYDROCHLOROTHIAZIDE 37.5; 25 MG/1; MG/1
1 CAPSULE ORAL DAILY
Qty: 90 CAPSULE | Refills: 3 | OUTPATIENT
Start: 2022-07-11

## 2022-07-11 RX ORDER — LISINOPRIL 20 MG/1
20 TABLET ORAL DAILY
Qty: 30 TABLET | Refills: 3 | Status: SHIPPED | OUTPATIENT
Start: 2022-07-11 | End: 2023-07-14

## 2022-07-11 RX ORDER — GLUCOSAMINE HCL/CHONDROITIN SU 500-400 MG
CAPSULE ORAL
Qty: 100 EACH | Refills: 3 | Status: SHIPPED | OUTPATIENT
Start: 2022-07-11

## 2022-07-11 RX ORDER — LANCETS
EACH MISCELLANEOUS
Qty: 100 EACH | Refills: 6 | Status: SHIPPED | OUTPATIENT
Start: 2022-07-11

## 2022-07-11 RX ORDER — VERAPAMIL HYDROCHLORIDE 180 MG/1
180 TABLET, EXTENDED RELEASE ORAL AT BEDTIME
Qty: 30 TABLET | Refills: 3 | Status: SHIPPED | OUTPATIENT
Start: 2022-07-11 | End: 2023-07-14

## 2022-07-11 NOTE — PROGRESS NOTES
Assessment & Plan     David was seen today for diabetes.    Diagnoses and all orders for this visit:    Type 2 diabetes mellitus with hyperglycemia, without long-term current use of insulin (H)  Uncontrolled with current therapy; offered/discussed MTM assistant for close monitoring; will check A1C, albumin and creat today; renewed medications  -     HEMOGLOBIN A1C; Future  -     BASIC METABOLIC PANEL; Future  -     Albumin Random Urine Quantitative with Creat Ratio; Future  -     blood glucose monitoring (NO BRAND SPECIFIED) meter device kit; Use to test blood sugar 2 times daily before breakfast and at dinner. Preferred blood glucose meter OR supplies to accompany: Blood Glucose Monitor Brands: per insurance.  -     blood glucose (NO BRAND SPECIFIED) test strip; Use to test blood sugar 2 times daily before breakfast and at dinner. Preferred blood glucose meter OR supplies to accompany: Blood Glucose Monitor Brands: per insurance.  -     blood glucose calibration (NO BRAND SPECIFIED) solution; To accompany: Blood Glucose Monitor Brands: per insurance.  -     thin (NO BRAND SPECIFIED) lancets; Use with lanceting device. To accompany: Blood Glucose Monitor Brands: per insurance.  -     alcohol swab prep pads; Use to swab area of injection/checo as directed.  -     HEMOGLOBIN A1C  -     BASIC METABOLIC PANEL  -     Albumin Random Urine Quantitative with Creat Ratio    Hypertension goal BP (blood pressure) < 140/90  Essential hypertension with goal blood pressure less than 140/90  suboptimal control in clinic today; will repeat at check at; recommend  Monitor home blood pressure 2-3 times per week; notify if above goal <130/80; dietary and exercise changes to improve blood pressure  Plan: DME order for Blood pressure machine  -     Home Blood Pressure Monitor Order  -     Renew verapamil ER (CALAN-SR) 180 MG CR tablet; Take 1 tablet (180 mg) by mouth At Bedtime  -     triamterene-HCTZ (DYAZIDE) 37.5-25 MG capsule;  "Take 1 capsule by mouth daily  -     lisinopril (ZESTRIL) 20 MG tablet; Take 1 tablet (20 mg) by mouth daily      Screening for hyperlipidemia  The 10-year ASCVD risk score (Marcelo KATHRYN Jr., et al., 2013) is: 58.5%  - Stable; recommend change to lipitor high dose vs simvastatin increase; repeat in 6 months    -     Lipid panel reflex to direct LDL Fasting; Future          Chronic kidney disease, stage 2 (mild)  -     Albumin Random Urine Quantitative with Creat Ratio; Future  -     Albumin Random Urine Quantitative with Creat Ratio    Impaired cognition  -     Occupational Therapy Referral; Future    Other orders  -     REVIEW OF HEALTH MAINTENANCE PROTOCOL ORDERS                   BMI:   Estimated body mass index is 30.4 kg/m  as calculated from the following:    Height as of this encounter: 1.803 m (5' 11\").    Weight as of this encounter: 98.9 kg (218 lb).           Return in about 3 weeks (around 8/1/2022) for Follow up, in person, DM, HTN.    DANIEL Arita [facility]      77 year old  year old male with [facility]   Patient Active Problem List   Diagnosis Code     Hyperlipidemia with target LDL less than 100 E78.5     Type 2 diabetes, HbA1c goal < 7% (H) E11.9     Hypertension goal BP (blood pressure) < 140/90 I10     Advanced directives, counseling/discussion Z71.89     Type 2 diabetes mellitus with hyperglycemia (H) E11.65     Chronic gingivitis K05.10     in clinic for acute office visit related to/establish care/to discuss       Subjective   [place] is a 77 year old presenting for the following health issues: Diabetes    Patient in clinic for DM follow up; PCP Dr. Wegener last seen 8/2020 w/ A1c 9.0% current regimen   Metformin 2000 mg daily; actos 45 mg; not consistently checking home readings.     - Hypertension: bp elevated in clinic today; not monitoring home bp measurements; taking prescribed regimen as ordered    HPI     Diabetes Follow-up      How often are you checking " "your blood sugar? Not at all    What concerns do you have today about your diabetes? None and Low blood sugar     Do you have any of these symptoms? (Select all that apply)  No numbness or tingling in feet.  No redness, sores or blisters on feet.  No complaints of excessive thirst.  No reports of blurry vision.  No significant changes to weight.    Have you had a diabetic eye exam in the last 12 months? Yes-  Location: 6/2021        BP Readings from Last 2 Encounters:   07/11/22 (!) 158/79   08/18/20 112/62     Hemoglobin A1C POCT (%)   Date Value   08/18/2020 9.0 (H)   09/23/2019 7.5 (H)     LDL Cholesterol Calculated (mg/dL)   Date Value   08/18/2020 105 (H)   09/23/2019 105 (H)           How many servings of fruits and vegetables do you eat daily?  2-3    On average, how many sweetened beverages do you drink each day (Examples: soda, juice, sweet tea, etc.  Do NOT count diet or artificially sweetened beverages)?   2    How many days per week do you exercise enough to make your heart beat faster? 3 or less    How many minutes a day do you exercise enough to make your heart beat faster? 10 - 19    How many days per week do you miss taking your medication? 0        Review of Systems   Constitutional, HEENT, cardiovascular, pulmonary, gi and gu systems are negative, except as otherwise noted.      Objective    BP (!) 158/79 (BP Location: Left arm, Patient Position: Sitting, Cuff Size: Adult Large)   Pulse 104   Temp 98.4  F (36.9  C) (Temporal)   Resp 16   Ht 1.803 m (5' 11\")   Wt 98.9 kg (218 lb)   SpO2 99%   BMI 30.40 kg/m    Body mass index is 30.4 kg/m .       Physical Exam   GENERAL: healthy, alert and no distress  NECK: no adenopathy, no asymmetry, masses, or scars and thyroid normal to palpation  RESP: lungs clear to auscultation - no rales, rhonchi or wheezes  CV: regular rate and rhythm, normal S1 S2, no S3 or S4, no murmur, click or rub, no peripheral edema and peripheral pulses strong  ABDOMEN: " soft, nontender, no hepatosplenomegaly, no masses and bowel sounds normal  MS: no gross musculoskeletal defects noted, no edema    Results for orders placed or performed in visit on 07/11/22   HEMOGLOBIN A1C     Status: Abnormal   Result Value Ref Range    Hemoglobin A1C 9.4 (H) 0.0 - 5.6 %   BASIC METABOLIC PANEL     Status: Abnormal   Result Value Ref Range    Sodium 139 133 - 144 mmol/L    Potassium 4.2 3.4 - 5.3 mmol/L    Chloride 106 94 - 109 mmol/L    Carbon Dioxide (CO2) 22 20 - 32 mmol/L    Anion Gap 11 3 - 14 mmol/L    Urea Nitrogen 24 7 - 30 mg/dL    Creatinine 1.21 0.66 - 1.25 mg/dL    Calcium 9.0 8.5 - 10.1 mg/dL    Glucose 185 (H) 70 - 99 mg/dL    GFR Estimate 62 >60 mL/min/1.73m2   Lipid panel reflex to direct LDL Fasting     Status: Abnormal   Result Value Ref Range    Cholesterol 141 <200 mg/dL    Triglycerides 154 (H) <150 mg/dL    Direct Measure HDL 40 >=40 mg/dL    LDL Cholesterol Calculated 70 <=100 mg/dL    Non HDL Cholesterol 101 <130 mg/dL    Patient Fasting > 8hrs? Yes     Narrative    Cholesterol  Desirable:  <200 mg/dL    Triglycerides  Normal:  Less than 150 mg/dL  Borderline High:  150-199 mg/dL  High:  200-499 mg/dL  Very High:  Greater than or equal to 500 mg/dL    Direct Measure HDL  Female:  Greater than or equal to 50 mg/dL   Male:  Greater than or equal to 40 mg/dL    LDL Cholesterol  Desirable:  <100mg/dL  Above Desirable:  100-129 mg/dL   Borderline High:  130-159 mg/dL   High:  160-189 mg/dL   Very High:  >= 190 mg/dL    Non HDL Cholesterol  Desirable:  130 mg/dL  Above Desirable:  130-159 mg/dL  Borderline High:  160-189 mg/dL  High:  190-219 mg/dL  Very High:  Greater than or equal to 220 mg/dL   Albumin Random Urine Quantitative with Creat Ratio     Status: Abnormal   Result Value Ref Range    Creatinine Urine mg/dL 189 mg/dL    Albumin Urine mg/L 271 mg/L    Albumin Urine mg/g Cr 143.39 (H) 0.00 - 17.00 mg/g Cr                   .  ..  DME (Durable Medical Equipment) Orders  and Documentation  Orders Placed This Encounter   Procedures     Home Blood Pressure Monitor Order      The patient was assessed and it was determined the patient is in need of the following listed DME Supplies/Equipment. Please complete supporting documentation below to demonstrate medical necessity.      DME All Other Item(s) Documentation    List reason for need and supporting documentation for medical necessity below for each DME item.     1.

## 2022-07-12 LAB
ANION GAP SERPL CALCULATED.3IONS-SCNC: 11 MMOL/L (ref 3–14)
BUN SERPL-MCNC: 24 MG/DL (ref 7–30)
CALCIUM SERPL-MCNC: 9 MG/DL (ref 8.5–10.1)
CHLORIDE BLD-SCNC: 106 MMOL/L (ref 94–109)
CHOLEST SERPL-MCNC: 141 MG/DL
CO2 SERPL-SCNC: 22 MMOL/L (ref 20–32)
CREAT SERPL-MCNC: 1.21 MG/DL (ref 0.66–1.25)
FASTING STATUS PATIENT QL REPORTED: YES
GFR SERPL CREATININE-BSD FRML MDRD: 62 ML/MIN/1.73M2
GLUCOSE BLD-MCNC: 185 MG/DL (ref 70–99)
HDLC SERPL-MCNC: 40 MG/DL
LDLC SERPL CALC-MCNC: 70 MG/DL
NONHDLC SERPL-MCNC: 101 MG/DL
POTASSIUM BLD-SCNC: 4.2 MMOL/L (ref 3.4–5.3)
SODIUM SERPL-SCNC: 139 MMOL/L (ref 133–144)
TRIGL SERPL-MCNC: 154 MG/DL

## 2022-07-12 RX ORDER — TRIAMTERENE AND HYDROCHLOROTHIAZIDE 37.5; 25 MG/1; MG/1
CAPSULE ORAL
Qty: 14 CAPSULE | Refills: 0 | OUTPATIENT
Start: 2022-07-12

## 2022-07-12 NOTE — TELEPHONE ENCOUNTER
Message sent to pharmacy - Refusal reason: Tanya Grace RN    This request came in on 7/11/22 and I see order sent to requesting pharm on 7/11/22.

## 2022-07-13 LAB
CREAT UR-MCNC: 189 MG/DL
MICROALBUMIN UR-MCNC: 271 MG/L
MICROALBUMIN/CREAT UR: 143.39 MG/G CR (ref 0–17)

## 2022-08-05 DIAGNOSIS — N40.1 BENIGN NON-NODULAR PROSTATIC HYPERPLASIA WITH LOWER URINARY TRACT SYMPTOMS: ICD-10-CM

## 2022-08-05 DIAGNOSIS — E11.65 TYPE 2 DIABETES MELLITUS WITH HYPERGLYCEMIA, WITHOUT LONG-TERM CURRENT USE OF INSULIN (H): ICD-10-CM

## 2022-08-08 NOTE — TELEPHONE ENCOUNTER
I haven't seen David for two years so sending refill request to provider Waylon Arizmendi who has seen him recently.     I do not know if has changed primary providers.     Joel Wegener,MD

## 2022-08-08 NOTE — TELEPHONE ENCOUNTER
Routing refill request to provider for review/approval because:  Labs out of range:  A1c  BP also above 140/90 goal  Marli BEASLEY RN

## 2022-08-09 RX ORDER — TAMSULOSIN HYDROCHLORIDE 0.4 MG/1
CAPSULE ORAL
Qty: 30 CAPSULE | Refills: 0 | Status: SHIPPED | OUTPATIENT
Start: 2022-08-09 | End: 2022-09-08

## 2022-08-09 RX ORDER — PIOGLITAZONEHYDROCHLORIDE 45 MG/1
TABLET ORAL
Qty: 30 TABLET | Refills: 0 | Status: SHIPPED | OUTPATIENT
Start: 2022-08-09 | End: 2022-09-08

## 2022-09-07 DIAGNOSIS — E11.65 TYPE 2 DIABETES MELLITUS WITH HYPERGLYCEMIA, WITHOUT LONG-TERM CURRENT USE OF INSULIN (H): ICD-10-CM

## 2022-09-07 DIAGNOSIS — N40.1 BENIGN NON-NODULAR PROSTATIC HYPERPLASIA WITH LOWER URINARY TRACT SYMPTOMS: ICD-10-CM

## 2022-09-07 NOTE — TELEPHONE ENCOUNTER
Patient came into clinic regarding medications. Was told that on the bottle it reads must be seen for further refills. Patient has an appointment scheduled 9/27, but will be out within next few days. Requesting bridge rx to last until appt later this month

## 2022-09-08 RX ORDER — PIOGLITAZONEHYDROCHLORIDE 45 MG/1
45 TABLET ORAL DAILY
Qty: 90 TABLET | Refills: 3 | Status: SHIPPED | OUTPATIENT
Start: 2022-09-08 | End: 2023-07-14

## 2022-09-08 RX ORDER — TAMSULOSIN HYDROCHLORIDE 0.4 MG/1
0.4 CAPSULE ORAL DAILY
Qty: 90 CAPSULE | Refills: 3 | Status: SHIPPED | OUTPATIENT
Start: 2022-09-08 | End: 2023-07-14

## 2022-09-08 NOTE — TELEPHONE ENCOUNTER
Left message on machine to call back.  Ask to speak to any triage nurse, let them know it's a return call.      Calling to let patient know refill has been sent.  Isa Merrill RN  [facility]

## 2022-11-19 DIAGNOSIS — I10 ESSENTIAL HYPERTENSION WITH GOAL BLOOD PRESSURE LESS THAN 140/90: ICD-10-CM

## 2022-11-19 DIAGNOSIS — E78.5 HYPERLIPIDEMIA WITH TARGET LDL LESS THAN 100: ICD-10-CM

## 2022-11-21 DIAGNOSIS — E78.5 HYPERLIPIDEMIA WITH TARGET LDL LESS THAN 100: ICD-10-CM

## 2022-11-21 RX ORDER — TRIAMTERENE AND HYDROCHLOROTHIAZIDE 37.5; 25 MG/1; MG/1
1 CAPSULE ORAL DAILY
Qty: 14 CAPSULE | Refills: 0 | Status: SHIPPED | OUTPATIENT
Start: 2022-11-21 | End: 2023-07-14

## 2022-11-21 RX ORDER — SIMVASTATIN 20 MG
TABLET ORAL
Qty: 90 TABLET | Refills: 3 | OUTPATIENT
Start: 2022-11-21

## 2022-11-21 NOTE — TELEPHONE ENCOUNTER
Routing refill request to provider for review/approval because:  -- Joyce given x1. PENDED.   -- Patient has been cancelling appointments  -- Patient needs to be seen because:  Due for annual visit  -- Blood pressure out of target range    Diuretics (Including Combos) Protocol Failed      Blood pressure under 140/90 in past 12 months      BP Readings from Last 3 Encounters:   07/11/22 (!) 156/77   08/18/20 112/62   04/01/19 118/64     Last Written Prescription Date:  7/11/2022  Last Fill Quantity: 14,  # refills: 0   Last office visit: 7/11/2022 with prescribing provider:  Wegener at [facility] on 8/18/2020   Future Office Visit:  None    Scheduling:   Please contact patient to schedule an annual preventative. Thank you.     Sophie Tinajero, BSN RN  [facility]

## 2022-11-23 NOTE — TELEPHONE ENCOUNTER
Routing refill request to provider for review/approval because:  --Last authorized by previous provider at [facility].    --Last visit:  7/11/2022 LEN plan - Return in about 3 weeks (around 8/1/2022) for Follow up, in person, DM, HTN.    --Patient cancelled appointment 8/12/22 and no showed 11/7/22.    --Future Visit: none.    --Last Written Prescription Date:     Disp Refills Start End PAVAN   simvastatin (ZOCOR) 20 MG tablet 90 tablet 3 11/24/2021  No   Sig: TAKE ONE TABLET BY MOUTH AT BEDTIME

## 2022-11-26 ENCOUNTER — NURSE TRIAGE (OUTPATIENT)
Dept: NURSING | Facility: CLINIC | Age: 77
End: 2022-11-26

## 2022-11-27 NOTE — TELEPHONE ENCOUNTER
Partner is calling with patient's question: Should he take his metformin if he is not eating a big meal ?  He ate a hamburger and grilled cheese sandwhich and Glucerna today. Advised to take tonight's Metformin.   He last tested his blood sugar the day before yesterday and it was 300 after not taking Metformin for a couple of days. After taking it his blood sugar came down to 201.   He had a fall a year ago, since then he has had weakness in his legs. He is able to walk without assistance. He is taking liquids OK. No other sx noted at this time.     Advised to contact PCP or diabetic education to discuss his medication question further. She states they will call on Monday.    Charlette Forbes RN Triage Nurse Advisor 9:46 PM 11/26/2022    Reason for Disposition    [1] Caller has NON-URGENT medicine question about med that PCP prescribed AND [2] triager unable to answer question    Additional Information    Negative: [1] Intentional drug overdose AND [2] suicidal thoughts or ideas    Negative: Drug overdose and triager unable to answer question    Negative: Caller requesting a renewal or refill of a medicine patient is currently taking    Negative: Caller requesting information unrelated to medicine    Negative: Caller requesting information about COVID-19 Vaccine    Negative: Caller requesting information about Emergency Contraception    Negative: Caller requesting information about Combined Birth Control Pills    Negative: Caller requesting information about Progestin Birth Control Pills    Negative: Caller requesting information about Post-Op pain or medicines    Negative: Caller requesting a prescription antibiotic (such as Penicillin) for Strep throat and has a positive culture result    Negative: Caller requesting a prescription anti-viral med (such as Tamiflu) and has influenza (flu) symptoms    Negative: Immunization reaction suspected    Negative: Rash while taking a medicine or within 3 days of stopping it     Negative: [1] Asthma and [2] having symptoms of asthma (cough, wheezing, etc.)    Negative: [1] Symptom of illness (e.g., headache, abdominal pain, earache, vomiting) AND [2] more than mild    Negative: Breastfeeding questions about mother's medicines and diet    Negative: MORE THAN A DOUBLE DOSE of a prescription or over-the-counter (OTC) drug    Negative: [1] DOUBLE DOSE (an extra dose or lesser amount) of prescription drug AND [2] any symptoms (e.g., dizziness, nausea, pain, sleepiness)    Negative: [1] DOUBLE DOSE (an extra dose or lesser amount) of over-the-counter (OTC) drug AND [2] any symptoms (e.g., dizziness, nausea, pain, sleepiness)    Negative: Took another person's prescription drug    Negative: [1] DOUBLE DOSE (an extra dose or lesser amount) of prescription drug AND [2] NO symptoms (Exception: a double dose of antibiotics)    Negative: Diabetes drug error or overdose (e.g., took wrong type of insulin or took extra dose)    Negative: [1] Prescription not at pharmacy AND [2] was prescribed by PCP recently (Exception: triager has access to EMR and prescription is recorded there. Go to Home Care and confirm for pharmacy.)    Negative: [1] Pharmacy calling with prescription question AND [2] triager unable to answer question    Negative: [1] Caller has URGENT medicine question about med that PCP or specialist prescribed AND [2] triager unable to answer question    Negative: Medicine patch causing local rash or itching    Negative: [1] Caller has medicine question about med NOT prescribed by PCP AND [2] triager unable to answer question (e.g., compatibility with other med, storage)    Negative: Prescription request for new medicine (not a refill)    Protocols used: MEDICATION QUESTION CALL-A-

## 2022-11-28 RX ORDER — SIMVASTATIN 20 MG
TABLET ORAL
Qty: 30 TABLET | Refills: 0 | Status: SHIPPED | OUTPATIENT
Start: 2022-11-28 | End: 2023-07-14

## 2022-12-07 ENCOUNTER — TELEPHONE (OUTPATIENT)
Dept: FAMILY MEDICINE | Facility: CLINIC | Age: 77
End: 2022-12-07

## 2022-12-07 NOTE — TELEPHONE ENCOUNTER
General Call      Reason for Call:  Shahla from home health care is Calling to find out if you will be following home care services for patient with faxes etc.    What are your questions or concerns:  Need to know if you will be follow patient on her home care services and faxes    Date of last appointment with provider: 7/11/2022    Okay to leave a detailed message?: Yes at Other phone number:  [phone]

## 2022-12-07 NOTE — TELEPHONE ENCOUNTER
7/11/22 was last ov with Waylon Arizmendi.  No future appt currently made.    Will send to Waylon.  RAHEL Pimentel

## 2022-12-08 NOTE — TELEPHONE ENCOUNTER
Left detailed message on Shahla's identified voicemail relaying comments per MARCIO Arizmendi CNP.    ROSE MARIE SkinnerN, RN-BC  [facility]

## 2022-12-08 NOTE — TELEPHONE ENCOUNTER
Patient has no-showed 3 visits; seen once 7/2022; no visit since admission. Unable to cover home health services at this time. Recommend follow up visit.  Thank you,  SW

## 2022-12-22 ENCOUNTER — TELEPHONE (OUTPATIENT)
Dept: FAMILY MEDICINE | Facility: CLINIC | Age: 77
End: 2022-12-22
Payer: COMMERCIAL

## 2022-12-22 NOTE — TELEPHONE ENCOUNTER
Caregiver stated that he was put on amlodipine 10mg tablets at the hospital. Please send new rx.    Thank You  Bria Gupta [facility]

## 2022-12-22 NOTE — TELEPHONE ENCOUNTER
Patient scheduled to see Rachell at [facility] 12/23/22 and per appt notes that was patient request. Patient most likely going to est care with rachell after that visit. Closing TE.

## 2022-12-22 NOTE — TELEPHONE ENCOUNTER
It does appear from 11/27/22 hospitalization that pt was on 10mg amlodipine    Pended pharmacy    TCs- please contact pt to schedule visit with Waylon for any further refills    RADHA Tam RN  [facility]

## 2023-01-01 ENCOUNTER — TRANSFERRED RECORDS (OUTPATIENT)
Dept: MULTI SPECIALTY CLINIC | Facility: CLINIC | Age: 78
End: 2023-01-01

## 2023-01-01 LAB — RETINOPATHY: NORMAL

## 2023-01-18 NOTE — TELEPHONE ENCOUNTER
Reached out to only number on file and got busy tone 3x. No MyC and with appointment this afternoon sending letter to home address would not be much help.

## 2023-02-08 ENCOUNTER — OFFICE VISIT (OUTPATIENT)
Dept: FAMILY MEDICINE | Facility: CLINIC | Age: 78
End: 2023-02-08

## 2023-02-08 VITALS
OXYGEN SATURATION: 100 % | BODY MASS INDEX: 27.43 KG/M2 | SYSTOLIC BLOOD PRESSURE: 150 MMHG | HEART RATE: 116 BPM | DIASTOLIC BLOOD PRESSURE: 72 MMHG | HEIGHT: 73 IN | RESPIRATION RATE: 16 BRPM | WEIGHT: 207 LBS | TEMPERATURE: 98.1 F

## 2023-02-08 DIAGNOSIS — Z53.9 ERRONEOUS ENCOUNTER--DISREGARD: Primary | ICD-10-CM

## 2023-02-08 NOTE — PROGRESS NOTES
"  {PROVIDER CHARTING PREFERENCE:056546}    Genia Hernandez is a 77 year old accompanied by his spouse, presenting for the following health issues:  Follow Up and Hospital F/U      HPI       Hospital Follow-up Visit:    Hospital/Nursing Home/IP Rehab Facility: [facility]  Date of Admission: 11/30/2022  Date of Discharge: 12/07/2022  Reason(s) for Admission: Pneumonia     Was your hospitalization related to COVID-19? No   Problems taking medications regularly:  None  Medication changes since discharge: None  Problems adhering to non-medication therapy:  None    Summary of hospitalization:  See outside records, reviewed and scanned  Diagnostic Tests/Treatments reviewed.  Follow up needed: none  Other Healthcare Providers Involved in Patient s Care:         None  Update since discharge: improved. {TIP  Include information from family/caregivers, SNF, Care Coordination :700412}        Plan of care communicated with {Communicate Plan to:586224::\"patient\"}     {Reference  Coding guidelines- Moderate Complexity F2F/Video within 7 - 14 days of discharge 2674660, High Complexity F2F/Video within 7 days 4499975 or [id number] days 5853094 :308318}      {additonal problems for provider to add (Optional):500374}  {additonal problems for provider to add (Optional):228300}    Review of Systems   {ROS COMP (Optional):437483}      Objective    BP (!) 150/72 (BP Location: Left arm, Patient Position: Sitting, Cuff Size: Adult Regular)   Pulse 116   Temp 98.1  F (36.7  C) (Temporal)   Resp 16   Ht 1.854 m (6' 1\")   Wt 93.9 kg (207 lb)   SpO2 100%   BMI 27.31 kg/m    Body mass index is 27.31 kg/m .  Physical Exam   {Exam List (Optional):404613}    {Diagnostic Test Results (Optional):208903}    {AMBULATORY ATTESTATION (Optional):643843}            "

## 2023-02-27 DIAGNOSIS — Z76.0 ENCOUNTER FOR MEDICATION REFILL: Primary | ICD-10-CM

## 2023-02-27 NOTE — TELEPHONE ENCOUNTER
Waylon-Please review, sign if agree and may close encounter.    Routing refill request to provider for review/approval because:  BP elevated and medication started when patient hospitalized at [facility].    BP Readings from Last 3 Encounters:   02/08/23 (!) 150/72   07/11/22 (!) 156/77   08/18/20 112/62     Thank you!  ROSE MARIE SkinnerN, RN-BC  [facility]

## 2023-02-27 NOTE — TELEPHONE ENCOUNTER
Medication Question or Refill    Contacts       Type Contact Phone/Fax    02/27/2023 04:07 PM CST Phone (Incoming) JORDEN GIBBS (Emergency Contact) [phone]          What medication are you calling about (include dose and sig)?: amilodipine    Preferred Pharmacy:     [facility] - [place], [place] - [address] [place] [address]  Phone: [phone] Fax: [phone]    Controlled Substance Agreement on file:   CSA -- Patient Level:    CSA: None found at the patient level.       Who prescribed the medication?: Hospital provider    Do you need a refill? Yes    When did you use the medication last? This am    Patient offered an appointment? No has upcoming on 3/21/23    Do you have any questions or concerns?  No      Okay to leave a detailed message?: Yes a t [phone]    Call taken on 2/27/23  At 411 pm by Lena Craig CMA CT

## 2023-03-01 RX ORDER — AMLODIPINE BESYLATE 10 MG/1
10 TABLET ORAL DAILY
Qty: 30 TABLET | Refills: 0 | Status: SHIPPED | OUTPATIENT
Start: 2023-03-01 | End: 2023-04-03

## 2023-03-02 NOTE — TELEPHONE ENCOUNTER
Brian Connors,     Did you see RN note below about pt.  Came in during the clinic's move.  Thanks    MARLON Paredes TC

## 2023-03-03 NOTE — PROGRESS NOTES
This encounter was opened in error. Please disregard.    Patient left before being seen.  DANIEL Arita CNP

## 2023-04-03 DIAGNOSIS — Z76.0 ENCOUNTER FOR MEDICATION REFILL: ICD-10-CM

## 2023-04-03 RX ORDER — AMLODIPINE BESYLATE 10 MG/1
TABLET ORAL
Qty: 30 TABLET | Refills: 0 | Status: SHIPPED | OUTPATIENT
Start: 2023-04-03 | End: 2023-05-02

## 2023-04-03 NOTE — TELEPHONE ENCOUNTER
Routing refill request to provider for review/approval because:  --Blood pressure not at goal.    --Last visit: 7/11/22 Ugo for diabetes and plan RTC 3 weeks in person.    --Future Visit: 4/17/23 Ugo for hosp follow up.          --Last Written Prescription:     Disp Refills Start End PAVAN   amLODIPine (NORVASC) 10 MG tablet 30 tablet 0 3/1/2023  --   Sig - Route: Take 1 tablet (10 mg) by mouth daily          BP Readings from Last 6 Encounters:   02/08/23 (!) 150/72   07/11/22 (!) 156/77   08/18/20 112/62   04/01/19 118/64   02/05/18 137/78   08/22/17 105/65

## 2023-04-03 NOTE — TELEPHONE ENCOUNTER
"Pt's daughter call to check on med refill status. Pt is out of med and can not wait. Daughter stated that med was refill by Waylon Arizmendi before. Told daughter that to get a response for the refill usually take up 3 to 5 business days. Offer to put the order in high priority. Daughter stated she will call back later to check on status. Med that need refill is \"amLODIPine (NORVASC) 10 MG\". Confirmed with daughter, pharmacy is [facility]. Call back number to notify is pt' daughter phone number at [phone].  "

## 2023-04-17 DIAGNOSIS — E11.65 TYPE 2 DIABETES MELLITUS WITH HYPERGLYCEMIA, WITHOUT LONG-TERM CURRENT USE OF INSULIN (H): ICD-10-CM

## 2023-04-17 NOTE — TELEPHONE ENCOUNTER
HP refills, PCP      Patient's significant other called.  Patient has 3 Metformin left.    Needs refill.  Looks like patient has appointment for today at 4:30.  Not sure if patient aware of this.  Significant other did not mention.    Thank you,  Maria Elena Linder RN

## 2023-05-02 DIAGNOSIS — Z76.0 ENCOUNTER FOR MEDICATION REFILL: ICD-10-CM

## 2023-05-02 RX ORDER — AMLODIPINE BESYLATE 10 MG/1
10 TABLET ORAL DAILY
Qty: 30 TABLET | Refills: 0 | Status: SHIPPED | OUTPATIENT
Start: 2023-05-02 | End: 2023-05-31

## 2023-05-02 NOTE — TELEPHONE ENCOUNTER
pts significant other called in for this request, he has 2 tabs left.       Routing refill request to provider for review/approval because:  Joyce refill given    Pt now scheduled 5/23/23 with PCP.     Pended bridge.     Ximena Carmona, ROSE MARIEN RN  [facility]

## 2023-05-16 DIAGNOSIS — E11.65 TYPE 2 DIABETES MELLITUS WITH HYPERGLYCEMIA, WITHOUT LONG-TERM CURRENT USE OF INSULIN (H): ICD-10-CM

## 2023-05-16 NOTE — TELEPHONE ENCOUNTER
Yoanna, SO, calling.  Pt's metformin was filled only for 2 weeks.  His next appt is 5/23/23.  Can pt get a one month supply to get until this appt.  PT already had a angel refill.  RAHEL Pimentel

## 2023-05-31 DIAGNOSIS — Z76.0 ENCOUNTER FOR MEDICATION REFILL: ICD-10-CM

## 2023-05-31 NOTE — TELEPHONE ENCOUNTER
Routing refill request to provider for review/approval because:  -- Joyce given x1 and patient did follow up: 6/26/2023  -- Blood pressure elevated    Calcium Channel Blockers Protocol  Failed      Blood pressure under 140/90 in past 12 months        BP Readings from Last 3 Encounters:   02/08/23 (!) 150/72   07/11/22 (!) 156/77   08/18/20 112/62      Last Written Prescription Date:  5/2/2023  Last Fill Quantity: 30,  # refills: 0   Last office visit: 7/11/2022 with prescribing provider:  Waylon Arizmendi   Future Office Visit:   Next 5 appointments (look out 90 days)    Jun 26, 2023 11:00 AM  (Arrive by 10:40 AM)  Provider Visit with DANIEL Arita CNP  [facility] ([facility] ) [address] [place] [address]  [phone]         RADHA Beaver RN  [facility]

## 2023-06-01 RX ORDER — AMLODIPINE BESYLATE 10 MG/1
10 TABLET ORAL DAILY
Qty: 30 TABLET | Refills: 0 | Status: SHIPPED | OUTPATIENT
Start: 2023-06-01 | End: 2023-06-28

## 2023-06-01 NOTE — TELEPHONE ENCOUNTER
AST RENEWAL BY ME. PATIENT WAS SEEN ONCE 7/2022 FOR ACUTE VISIT AND NO SHOWED OVER 3 APPOINTMENTS. HOSPITALIZATION SINCE MY LAST VISIT.   SW

## 2023-06-01 NOTE — TELEPHONE ENCOUNTER
LAST RENEWAL BY ME. PATIENT WAS SEEN ONCE 7/2022 FOR ACUTE VISIT AND NO SHOWED OVER 3 APPOINTMENTS. HOSPITALIZATION SINCE MY LAST VISIT.

## 2023-06-20 DIAGNOSIS — E11.65 TYPE 2 DIABETES MELLITUS WITH HYPERGLYCEMIA, WITHOUT LONG-TERM CURRENT USE OF INSULIN (H): ICD-10-CM

## 2023-06-20 NOTE — TELEPHONE ENCOUNTER
Waylon-Please review, sign if agree and may close encounter. 7 day supply pended.    Yoanna, significant other called.    Consent to communicate on file.    Per Yoanna, patient has two days left of Metformin and needs refill.  Has appt with PCP on 6/26/23.    Informed Yoanna writer unable to authorize refill per RN protocol and will ask MARCIO Arizmendi CNP, to send in angel refill.    Yoanna verbalized understanding and in agreement with plan.    Routing refill request to provider for review/approval because:  Angel given x1 and patient did not follow up, please advise    Last Written Prescription Date:  5/16/23  Last Fill Quantity: 30,  # refills: 0   Last office visit: 7/11/2 ; last virtual visit: Visit date not found with prescribing provider:  MARCIO Arizmendi CNP   Future Office Visit:   Next 5 appointments (look out 90 days)    Jun 26, 2023 11:00 AM  (Arrive by 10:40 AM)  Provider Visit with DANIEL Arita CNP  [facility] ([facility] ) [address] [place] [address]  [phone]             Thank you!  ROSE MARIE SkinnerN, RN-BC  [facility]

## 2023-06-27 DIAGNOSIS — Z76.0 ENCOUNTER FOR MEDICATION REFILL: ICD-10-CM

## 2023-06-28 NOTE — TELEPHONE ENCOUNTER
Pt SO calling back to check on amlodipine status.    Routing again to provider; RN cannot authorize repeat angel refill.    Last Written Prescription Date:  6/1/23  Last Fill Quantity: 30,  # refills: 0   Last office visit: 711/22 with Ugo  Future Office Visit:   Next 5 appointments (look out 90 days)    Jul 03, 2023 10:00 AM  (Arrive by 9:40 AM)  Provider Visit with DANIEL Arita CNP  [facility] ([facility] ) [address] [place] [address]  [phone]         Anahi DENNISON RN  [facility]

## 2023-06-29 RX ORDER — AMLODIPINE BESYLATE 10 MG/1
10 TABLET ORAL DAILY
Qty: 14 TABLET | Refills: 0 | Status: SHIPPED | OUTPATIENT
Start: 2023-06-29 | End: 2023-07-12

## 2023-07-11 DIAGNOSIS — E11.65 TYPE 2 DIABETES MELLITUS WITH HYPERGLYCEMIA, WITHOUT LONG-TERM CURRENT USE OF INSULIN (H): ICD-10-CM

## 2023-07-11 DIAGNOSIS — Z76.0 ENCOUNTER FOR MEDICATION REFILL: ICD-10-CM

## 2023-07-11 RX ORDER — AMLODIPINE BESYLATE 10 MG/1
10 TABLET ORAL DAILY
Qty: 21 TABLET | Refills: 0 | OUTPATIENT
Start: 2023-07-11

## 2023-07-11 NOTE — TELEPHONE ENCOUNTER
Waylon-Please review, sign if agree and may close encounter.  Bridge refills pended.    Call from Yoanna Blake; consent to communicate on file.    Per Yoanna:  1. Patient is scheduled on 8/1/23 for office visit with MARCIO Arizmendi CNP  2. Needs refills of   A.  Amlodipine 10 mg   B. Metformin 1000 mg  3. Patient's blood pressure today was 144/70  4. Patient has a couple days remaining of these medications    Informed Yoanna high priority request will be sent to MARCIO Arizmendi CNP.    Yoanna verbalized understanding and in agreement with plan.    Thank you!  ROSE MARIE SkinnerN, RN-BC  [facility]

## 2023-07-12 RX ORDER — AMLODIPINE BESYLATE 10 MG/1
10 TABLET ORAL DAILY
Qty: 2 TABLET | Refills: 0 | Status: SHIPPED | OUTPATIENT
Start: 2023-07-12 | End: 2023-07-14

## 2023-07-12 NOTE — TELEPHONE ENCOUNTER
"Waylon Arizmendi APRN CNP  to [facility]      7/11/23  8:18 PM   \"Patient has received multiple angel refills with pending appointment which changes; last visit with provider 7/2022 for acute visit. Patient has no showed multiple visit; been hospitalized and visit with health system w/altered mental status. please offer same day/pos for the next available for continued refills.       DANIEL Arita CNP \"        Writer called [employer] x 2 and phone line busy.    That number is the only contact number listed for patient.    Recall at another time.    ROSE MARIE SkinnerN, RN-BC  [facility]      "

## 2023-07-12 NOTE — TELEPHONE ENCOUNTER
Yoanna called back.  PT will run out of these meds before the 7/14/23 appt- metformin and amlodipine.    Can pt get 2 days worth of both meds?  Pended. Sending to [facility].      Reviewed message with both pt and Yoanna, friend.  Yoanna says the pt saw urology yesterday.  Yoanna agreed to make the appt ASAP for 7/14/23 with Waylon.  PT also got on the phone. He said he is still working with Dr. Wegener at [facility]. Yoanna says pt is NOT going to [facility].  Yoanna agreed to attend the 7/14/23 appt with pt.    I clearly explained the not showing up for appts is a problem.  Yoanna said they had to wait a long time for last appt at .  Confirmed appt for 7/14/23 at [facility].    Sent to KIP and Waylon.  RAHEL Pimentel

## 2023-07-12 NOTE — TELEPHONE ENCOUNTER
Left message on voicemail:  1. Two days worth of patient's two requested medications was sent to [facility].    ROSE MARIE SkinnerN, RN-BC  [facility]

## 2023-07-14 ENCOUNTER — OFFICE VISIT (OUTPATIENT)
Dept: FAMILY MEDICINE | Facility: CLINIC | Age: 78
End: 2023-07-14
Payer: COMMERCIAL

## 2023-07-14 VITALS
HEART RATE: 107 BPM | OXYGEN SATURATION: 99 % | SYSTOLIC BLOOD PRESSURE: 144 MMHG | WEIGHT: 201.6 LBS | RESPIRATION RATE: 18 BRPM | TEMPERATURE: 98.3 F | DIASTOLIC BLOOD PRESSURE: 74 MMHG | BODY MASS INDEX: 26.6 KG/M2

## 2023-07-14 DIAGNOSIS — N18.2 CHRONIC KIDNEY DISEASE, STAGE 2 (MILD): ICD-10-CM

## 2023-07-14 DIAGNOSIS — E78.5 HYPERLIPIDEMIA WITH TARGET LDL LESS THAN 100: ICD-10-CM

## 2023-07-14 DIAGNOSIS — E11.65 TYPE 2 DIABETES MELLITUS WITH HYPERGLYCEMIA, WITHOUT LONG-TERM CURRENT USE OF INSULIN (H): ICD-10-CM

## 2023-07-14 DIAGNOSIS — N39.0 COMPLICATED UTI (URINARY TRACT INFECTION): Primary | ICD-10-CM

## 2023-07-14 DIAGNOSIS — N40.1 BENIGN NON-NODULAR PROSTATIC HYPERPLASIA WITH LOWER URINARY TRACT SYMPTOMS: ICD-10-CM

## 2023-07-14 DIAGNOSIS — Z76.0 ENCOUNTER FOR MEDICATION REFILL: ICD-10-CM

## 2023-07-14 DIAGNOSIS — I10 HYPERTENSION GOAL BP (BLOOD PRESSURE) < 140/90: ICD-10-CM

## 2023-07-14 LAB
ALBUMIN UR-MCNC: >=300 MG/DL
AMORPH CRY #/AREA URNS HPF: ABNORMAL /HPF
ANION GAP SERPL CALCULATED.3IONS-SCNC: 13 MMOL/L (ref 7–15)
APPEARANCE UR: ABNORMAL
BACTERIA #/AREA URNS HPF: ABNORMAL /HPF
BILIRUB UR QL STRIP: NEGATIVE
BUN SERPL-MCNC: 18.2 MG/DL (ref 8–23)
CALCIUM SERPL-MCNC: 9.1 MG/DL (ref 8.8–10.2)
CHLORIDE SERPL-SCNC: 100 MMOL/L (ref 98–107)
CHOLEST SERPL-MCNC: 132 MG/DL
COLOR UR AUTO: YELLOW
CREAT SERPL-MCNC: 1.06 MG/DL (ref 0.67–1.17)
CREAT UR-MCNC: 130 MG/DL
DEPRECATED HCO3 PLAS-SCNC: 22 MMOL/L (ref 22–29)
GFR SERPL CREATININE-BSD FRML MDRD: 73 ML/MIN/1.73M2
GLUCOSE SERPL-MCNC: 448 MG/DL (ref 70–99)
GLUCOSE UR STRIP-MCNC: 500 MG/DL
HBA1C MFR BLD: 11.1 % (ref 0–5.6)
HDLC SERPL-MCNC: 36 MG/DL
HGB UR QL STRIP: ABNORMAL
KETONES UR STRIP-MCNC: ABNORMAL MG/DL
LDLC SERPL CALC-MCNC: 72 MG/DL
LEUKOCYTE ESTERASE UR QL STRIP: ABNORMAL
MICROALBUMIN UR-MCNC: 792 MG/L
MICROALBUMIN/CREAT UR: 609.23 MG/G CR (ref 0–17)
NITRATE UR QL: NEGATIVE
NONHDLC SERPL-MCNC: 96 MG/DL
PH UR STRIP: 5.5 [PH] (ref 5–7)
POTASSIUM SERPL-SCNC: 4.5 MMOL/L (ref 3.4–5.3)
RBC #/AREA URNS AUTO: ABNORMAL /HPF
SODIUM SERPL-SCNC: 135 MMOL/L (ref 136–145)
SP GR UR STRIP: 1.02 (ref 1–1.03)
TRIGL SERPL-MCNC: 121 MG/DL
UROBILINOGEN UR STRIP-ACNC: 0.2 E.U./DL
WBC #/AREA URNS AUTO: >100 /HPF

## 2023-07-14 PROCEDURE — 36415 COLL VENOUS BLD VENIPUNCTURE: CPT | Performed by: NURSE PRACTITIONER

## 2023-07-14 PROCEDURE — 80048 BASIC METABOLIC PNL TOTAL CA: CPT | Performed by: NURSE PRACTITIONER

## 2023-07-14 PROCEDURE — 82043 UR ALBUMIN QUANTITATIVE: CPT | Performed by: NURSE PRACTITIONER

## 2023-07-14 PROCEDURE — 80061 LIPID PANEL: CPT | Performed by: NURSE PRACTITIONER

## 2023-07-14 PROCEDURE — 81001 URINALYSIS AUTO W/SCOPE: CPT | Performed by: NURSE PRACTITIONER

## 2023-07-14 PROCEDURE — 82570 ASSAY OF URINE CREATININE: CPT | Performed by: NURSE PRACTITIONER

## 2023-07-14 PROCEDURE — 87086 URINE CULTURE/COLONY COUNT: CPT | Performed by: NURSE PRACTITIONER

## 2023-07-14 PROCEDURE — 99214 OFFICE O/P EST MOD 30 MIN: CPT | Performed by: NURSE PRACTITIONER

## 2023-07-14 PROCEDURE — 83036 HEMOGLOBIN GLYCOSYLATED A1C: CPT | Performed by: NURSE PRACTITIONER

## 2023-07-14 RX ORDER — AMLODIPINE BESYLATE 10 MG/1
10 TABLET ORAL DAILY
Qty: 90 TABLET | Refills: 0 | Status: SHIPPED | OUTPATIENT
Start: 2023-07-14 | End: 2023-09-12

## 2023-07-14 RX ORDER — SIMVASTATIN 20 MG
TABLET ORAL
Qty: 90 TABLET | Refills: 0 | Status: SHIPPED | OUTPATIENT
Start: 2023-07-14 | End: 2023-11-07

## 2023-07-14 RX ORDER — TAMSULOSIN HYDROCHLORIDE 0.4 MG/1
0.4 CAPSULE ORAL DAILY
Qty: 90 CAPSULE | Refills: 3 | Status: SHIPPED | OUTPATIENT
Start: 2023-07-14 | End: 2024-07-22

## 2023-07-14 RX ORDER — VERAPAMIL HYDROCHLORIDE 180 MG/1
180 TABLET, EXTENDED RELEASE ORAL AT BEDTIME
Qty: 90 TABLET | Refills: 0 | Status: SHIPPED | OUTPATIENT
Start: 2023-07-14 | End: 2023-07-14

## 2023-07-14 NOTE — PROGRESS NOTES
Assessment & Plan     Encounter for medication refill  - reviewed medications    Type 2 diabetes mellitus with hyperglycemia, without long-term current use of insulin (H)  Uncontrolled;   - HEMOGLOBIN A1C  - metFORMIN (GLUCOPHAGE) 1000 MG tablet  Dispense: 90 tablet; Refill: 0  - Continuous Blood Gluc  (FREESTYLE HARPAL 2 READER) JOE  Dispense: 1 each; Refill: 0  - Continuous Blood Gluc Sensor (FREESTYLE HARPAL 2 SENSOR) MISC  Dispense: 2 each; Refill: 5  - HEMOGLOBIN A1C  - Hemoglobin A1c  - **Basic metabolic panel FUTURE 14d    Hyperlipidemia with target LDL less than 100  The 10-year ASCVD risk score (Stephanie OWUSU, et al., 2019) is: 45.2%    Values used to calculate the score:      Age: 78 years      Sex: Male      Is Non- : Yes      Diabetic: Yes      Tobacco smoker: No      Systolic Blood Pressure: 144 mmHg      Is BP treated: Yes      HDL Cholesterol: 36 mg/dL      Total Cholesterol: 132 mg/dL  Renewed current statin;  (7/2022)  Assess ASCVD risk for statin therapy to reduce CAD/CVA risk;   - Lipid panel reflex to direct LDL Non-fasting  - simvastatin (ZOCOR) 20 MG tablet  Dispense: 90 tablet; Refill: 0  - Lipid panel reflex to direct LDL Non-fasting    Hypertension goal BP (blood pressure) < 140/90  Suboptimal control; will Renewed current medication  For your blood pressure:  Check your blood pressure 2-3 hours after medication   you should be sitting restfully for ~10 minutes before you take it.  Note your blood pressure on a paper log or on your phone  In ~2 weeks, send me a message on [employer] with your results.  Your goal is <140/90,   Low sodium, high potassium (DASH) diet.  - amLODIPine (NORVASC) 10 MG tablet  Dispense: 90 tablet; Refill: 0    Chronic kidney disease, stage 2 (mild)  Stable; discussed DM and HTN management tor reduce stress on kidney  - UA Macroscopic with reflex to Microscopic and Culture  - BASIC METABOLIC PANEL  - Albumin Random Urine Quantitative  "with Creat Ratio  - UA Macroscopic with reflex to Microscopic and Culture  - BASIC METABOLIC PANEL  - Albumin Random Urine Quantitative with Creat Ratio  - UA Microscopic with Reflex to Culture  - Urine Culture    Benign non-nodular prostatic hyperplasia with lower urinary tract symptoms  Stable; continue current regimen; renewed medication  - tamsulosin (FLOMAX) 0.4 MG capsule  Dispense: 90 capsule; Refill: 3    Complicated UTI (urinary tract infection)  - ciprofloxacin (CIPRO) 500 MG tablet  Dispense: 14 tablet; Refill: 0  - **UA reflex to Microscopic FUTURE 14d               BMI:   Estimated body mass index is 26.6 kg/m  as calculated from the following:    Height as of 2/8/23: 1.854 m (6' 1\").    Weight as of this encounter: 91.4 kg (201 lb 9.6 oz).           DANIEL Arita [facility]    Genia Hernandez is a 78 year old, presenting for the following health issues:  Hypertension and Recheck Medication        7/14/2023    11:40 AM   Additional Questions   Roomed by RAHEL CHEEMA   Accompanied by Spouse - Yoanna     History of Present Illness       Reason for visit:  Prespition refills    He eats 2-3 servings of fruits and vegetables daily.He consumes 1 sweetened beverage(s) daily.He exercises with enough effort to increase his heart rate 20 to 29 minutes per day.  He exercises with enough effort to increase his heart rate 3 or less days per week.   He is taking medications regularly.     - htn: home reading 125/77   - urinary retention: indwelling catheter   - DM: metformin, not checking home readings oter; no glipizide  D/c recs: resume metformin XR at 1g daily + start glipizide 5mg BID with meals  -new meter and testing supplies given at discharge  -instructed to test glucose BID going home  -hypo and hyperglycemia management reviewed    Review of Systems         Objective    BP (!) 144/74 (BP Location: Right arm, Patient Position: Sitting, Cuff Size: Adult Regular)   Pulse " 107   Temp 98.3  F (36.8  C) (Temporal)   Resp 18   Wt 91.4 kg (201 lb 9.6 oz)   SpO2 99%   BMI 26.60 kg/m    Body mass index is 26.6 kg/m .  Physical Exam

## 2023-07-14 NOTE — PATIENT INSTRUCTIONS
For your blood pressure:  Check your blood pressure once a day  It can be at different times of day, but you should be sitting restfully for ~10 minutes before you take it.  Note your blood pressure on a paper log or on your phone  In ~2 weeks, send me a message on [employer] with your results.  Your goal is <140/90, even better is <130/80.  Low sodium, high potassium (DASH) diet.    Diabetes:  check blood sugar 3 times a week fasting, midday and bedtime

## 2023-07-16 LAB — BACTERIA UR CULT: NORMAL

## 2023-07-23 RX ORDER — CIPROFLOXACIN 500 MG/1
500 TABLET, FILM COATED ORAL 2 TIMES DAILY
Qty: 14 TABLET | Refills: 0 | Status: SHIPPED | OUTPATIENT
Start: 2023-07-23 | End: 2023-07-30

## 2023-07-23 NOTE — RESULT ENCOUNTER NOTE
Team,    Please call patient with results. Recommend treating uti with Cipro 500 mg twice a day for 7 days and repeat UA and A1c in 2 weeks; ?elevated d/t infection.    DANIEL Arita CNP

## 2023-07-24 ENCOUNTER — TELEPHONE (OUTPATIENT)
Dept: FAMILY MEDICINE | Facility: CLINIC | Age: 78
End: 2023-07-24

## 2023-07-24 NOTE — TELEPHONE ENCOUNTER
Writer called patient x 2 and phone line busy.    Recall at another time.    RADHA Skinner, RN-BC  [facility]

## 2023-07-24 NOTE — TELEPHONE ENCOUNTER
----- Message from DANIEL Arita CNP sent at 7/23/2023  5:32 PM CDT -----  Team,    Please call patient with results. Recommend treating uti with Cipro 500 mg twice a day for 7 days and repeat UA and A1c in 2 weeks; ?elevated d/t infection.    DANIEL Arita CNP

## 2023-07-24 NOTE — TELEPHONE ENCOUNTER
Called Yoanna. Pt was with her.  Reviewed this result message.  They will pu the antibiotic today.  RAHEL Pimentel

## 2023-08-07 ENCOUNTER — OFFICE VISIT (OUTPATIENT)
Dept: FAMILY MEDICINE | Facility: CLINIC | Age: 78
End: 2023-08-07
Payer: COMMERCIAL

## 2023-08-07 VITALS
BODY MASS INDEX: 26.39 KG/M2 | WEIGHT: 200 LBS | RESPIRATION RATE: 20 BRPM | SYSTOLIC BLOOD PRESSURE: 128 MMHG | DIASTOLIC BLOOD PRESSURE: 62 MMHG | HEART RATE: 114 BPM | OXYGEN SATURATION: 100 %

## 2023-08-07 DIAGNOSIS — E11.65 TYPE 2 DIABETES MELLITUS WITH HYPERGLYCEMIA, WITHOUT LONG-TERM CURRENT USE OF INSULIN (H): Primary | ICD-10-CM

## 2023-08-07 DIAGNOSIS — I10 HYPERTENSION GOAL BP (BLOOD PRESSURE) < 140/90: ICD-10-CM

## 2023-08-07 DIAGNOSIS — R41.3 POOR MEMORY: ICD-10-CM

## 2023-08-07 DIAGNOSIS — N39.0 COMPLICATED UTI (URINARY TRACT INFECTION): ICD-10-CM

## 2023-08-07 LAB — HBA1C MFR BLD: 11.4 % (ref 0–5.6)

## 2023-08-07 PROCEDURE — 36415 COLL VENOUS BLD VENIPUNCTURE: CPT | Performed by: FAMILY MEDICINE

## 2023-08-07 PROCEDURE — 99215 OFFICE O/P EST HI 40 MIN: CPT | Performed by: FAMILY MEDICINE

## 2023-08-07 PROCEDURE — 83036 HEMOGLOBIN GLYCOSYLATED A1C: CPT | Performed by: FAMILY MEDICINE

## 2023-08-07 ASSESSMENT — PAIN SCALES - GENERAL: PAINLEVEL: NO PAIN (0)

## 2023-08-07 NOTE — PROGRESS NOTES
Assessment & Plan     Type 2 diabetes mellitus with hyperglycemia, without long-term current use of insulin (H)  -Uncontrolled, A1c 11.4.  Not consistently checking blood sugars.  -Declined referral to MTM and diabetic education prior. Amendable to extra help with diabetes management today, referral to MTM placed and appt scheduled.   -Kidney function stable, can increase metformin 1000 mg to twice a day with meals.  -Pt not currently taking glipizide, may need to add on next visit.  -Encouraged taking blood sugars regularly, can start with fasting am.  - Hemoglobin A1c  - Med Therapy Management Referral    Hypertension goal BP (blood pressure) < 140/90  -Well controlled with amlodipine  -No changes to meds today    Complicated UTI  -Symptoms resolved with abx  -Has urology follow-up next week    Poor memory  -Pt with poor short-term memory. First time seeing patient, he remembers very little of our conversation despite going over the above recs multiple times. Has had home health prior but wife notes she has to clean up after help and does not want them in her home. Will have pt follow-up with her current PCP, would recommend further assessment for this.       44 minutes spent by me on the date of the encounter doing chart review, history and exam, documentation and further activities per the note.    Zack Gomez [facility]    Genia Hernandez is a 78 year old, presenting for the following health issues:  Recheck Medication (Patient would like a refill on Verapamil), Diabetes (General Follow Up), and UTI (Patient went to the hospital and was given Keflex due to issues with his catheter. He would like to get a refill.)      Patient here to follow-up on DM2 and UTI. Here with wife who helps to take care of pt.    Seen in clinic on 7/14/23, diagnosed with UTI. Treated with cipro. Went to ED on 8/5/23 with concerns about for leaking Mathews catheter. Denies any urinary symptoms  today. Has appointment with urology next week.     Had A1c rechecked today, due to concerns that bladder infection was rasing his blood pressure. A1c 11.4. Checks his blood sugars a few times a week at random times during the day. Has been taking metformin 1000 mg daily, script is for 1000 mg twice a day. Not taking any other medications.     Wants his blood pressure medication switched back to verapamil. Currently taking amlodipine for blood pressure. /62 today.    Repeating questions today. Wife states he has done this as long as she has know him x 20 years. Pt states there is nothing wrong with his memory.       History of Present Illness       Diabetes:   He presents for follow up of diabetes.  He is checking home blood glucose a few times a week.   He checks blood glucose before meals and at bedtime.  Blood glucose is never over 200 and never under 70. He is aware of hypoglycemia symptoms including none.   He is concerned about other.    He is not experiencing numbness or burning in feet, excessive thirst, blurry vision, weight changes or redness, sores or blisters on feet. The patient has had a diabetic eye exam in the last 12 months. Eye exam performed on januar. [facility].        Hypertension: He presents for follow up of hypertension.  He does check blood pressure  regularly outside of the clinic. Outside blood pressures have been over 140/90. He does not follow a low salt diet.     Reason for visit:  Discuss vermipil    He eats 2-3 servings of fruits and vegetables daily.He exercises with enough effort to increase his heart rate 30 to 60 minutes per day.    He is taking medications regularly.               Review of Systems         Objective    /62   Pulse 114   Resp 20   Wt 90.7 kg (200 lb)   SpO2 100%   BMI 26.39 kg/m    Body mass index is 26.39 kg/m .  Physical Exam   GENERAL: healthy, alert and no distress  RESP: breathing comfortably, no acute respiratory  distress  NEURO: repeated questioning, leans on wife to answer questions

## 2023-08-07 NOTE — PATIENT INSTRUCTIONS
Take your metformin one tab with breakfast and one tab with dinner.  Check your blood sugar once a day in the morning before you eat.  Follow-up with the urologist as scheduled, this is the bladder specialist.

## 2023-09-04 ENCOUNTER — TELEPHONE (OUTPATIENT)
Dept: FAMILY MEDICINE | Facility: CLINIC | Age: 78
End: 2023-09-04

## 2023-09-04 DIAGNOSIS — E11.65 TYPE 2 DIABETES MELLITUS WITH HYPERGLYCEMIA, WITHOUT LONG-TERM CURRENT USE OF INSULIN (H): ICD-10-CM

## 2023-09-04 NOTE — TELEPHONE ENCOUNTER
Medication Question or Refill        What medication are you calling about (include dose and sig)?: Metformin 1000 MG    Preferred Pharmacy:   [facility] - [place], [place] - [address] [place] [address]  Phone: [phone] Fax: [phone]        Controlled Substance Agreement on file:   CSA -- Patient Level:    CSA: None found at the patient level.       Who prescribed the medication?: Waylon Arizmendi    Do you need a refill? Yes    When did you use the medication last. Last night    Patient offered an appointment? Yes: Patient has appt on 09/12. Did have prior appt, however due to the power outage at the clinic, patient was re-scheduled    Do you have any questions or concerns?  No      Okay to leave a detailed message?: Yes at Cell number on file:    Telephone Information:   Mobile [phone]

## 2023-09-12 ENCOUNTER — OFFICE VISIT (OUTPATIENT)
Dept: FAMILY MEDICINE | Facility: CLINIC | Age: 78
End: 2023-09-12
Payer: COMMERCIAL

## 2023-09-12 VITALS
TEMPERATURE: 97.9 F | HEART RATE: 116 BPM | BODY MASS INDEX: 28.04 KG/M2 | DIASTOLIC BLOOD PRESSURE: 74 MMHG | SYSTOLIC BLOOD PRESSURE: 162 MMHG | WEIGHT: 207 LBS | RESPIRATION RATE: 18 BRPM | OXYGEN SATURATION: 100 % | HEIGHT: 72 IN

## 2023-09-12 DIAGNOSIS — R97.20 ELEVATED PROSTATE SPECIFIC ANTIGEN (PSA): ICD-10-CM

## 2023-09-12 DIAGNOSIS — Z23 NEED FOR SHINGLES VACCINE: ICD-10-CM

## 2023-09-12 DIAGNOSIS — R41.81 AGE-RELATED COGNITIVE DECLINE: ICD-10-CM

## 2023-09-12 DIAGNOSIS — E11.65 TYPE 2 DIABETES MELLITUS WITH HYPERGLYCEMIA, WITHOUT LONG-TERM CURRENT USE OF INSULIN (H): Primary | ICD-10-CM

## 2023-09-12 DIAGNOSIS — R00.0 TACHYCARDIA: ICD-10-CM

## 2023-09-12 DIAGNOSIS — N39.0 COMPLICATED UTI (URINARY TRACT INFECTION): ICD-10-CM

## 2023-09-12 DIAGNOSIS — N18.31 ANEMIA DUE TO STAGE 3A CHRONIC KIDNEY DISEASE (H): ICD-10-CM

## 2023-09-12 DIAGNOSIS — I10 HYPERTENSION GOAL BP (BLOOD PRESSURE) < 140/90: ICD-10-CM

## 2023-09-12 DIAGNOSIS — D63.1 ANEMIA DUE TO STAGE 3A CHRONIC KIDNEY DISEASE (H): ICD-10-CM

## 2023-09-12 LAB
ALBUMIN UR-MCNC: >=300 MG/DL
AMORPH CRY #/AREA URNS HPF: ABNORMAL /HPF
APPEARANCE UR: ABNORMAL
BACTERIA #/AREA URNS HPF: ABNORMAL /HPF
BILIRUB UR QL STRIP: NEGATIVE
COLOR UR AUTO: ABNORMAL
ERYTHROCYTE [DISTWIDTH] IN BLOOD BY AUTOMATED COUNT: 13.5 % (ref 10–15)
GLUCOSE UR STRIP-MCNC: 500 MG/DL
HCT VFR BLD AUTO: 38.9 % (ref 40–53)
HGB BLD-MCNC: 13 G/DL (ref 13.3–17.7)
HGB UR QL STRIP: ABNORMAL
KETONES UR STRIP-MCNC: NEGATIVE MG/DL
LEUKOCYTE ESTERASE UR QL STRIP: ABNORMAL
MCH RBC QN AUTO: 28.5 PG (ref 26.5–33)
MCHC RBC AUTO-ENTMCNC: 33.4 G/DL (ref 31.5–36.5)
MCV RBC AUTO: 85 FL (ref 78–100)
NITRATE UR QL: NEGATIVE
PH UR STRIP: >=9 [PH] (ref 5–7)
PLATELET # BLD AUTO: 369 10E3/UL (ref 150–450)
RBC # BLD AUTO: 4.56 10E6/UL (ref 4.4–5.9)
RBC #/AREA URNS AUTO: ABNORMAL /HPF
SP GR UR STRIP: 1.01 (ref 1–1.03)
TRI-PHOS CRY #/AREA URNS HPF: ABNORMAL /HPF
URATE CRY #/AREA URNS HPF: ABNORMAL /HPF
UROBILINOGEN UR STRIP-ACNC: 0.2 E.U./DL
WBC # BLD AUTO: 7.9 10E3/UL (ref 4–11)
WBC #/AREA URNS AUTO: ABNORMAL /HPF

## 2023-09-12 PROCEDURE — 85027 COMPLETE CBC AUTOMATED: CPT | Performed by: NURSE PRACTITIONER

## 2023-09-12 PROCEDURE — G0103 PSA SCREENING: HCPCS | Performed by: NURSE PRACTITIONER

## 2023-09-12 PROCEDURE — 81001 URINALYSIS AUTO W/SCOPE: CPT | Performed by: NURSE PRACTITIONER

## 2023-09-12 PROCEDURE — 99215 OFFICE O/P EST HI 40 MIN: CPT | Performed by: NURSE PRACTITIONER

## 2023-09-12 PROCEDURE — 36415 COLL VENOUS BLD VENIPUNCTURE: CPT | Performed by: NURSE PRACTITIONER

## 2023-09-12 PROCEDURE — 80048 BASIC METABOLIC PNL TOTAL CA: CPT | Performed by: NURSE PRACTITIONER

## 2023-09-12 PROCEDURE — 93000 ELECTROCARDIOGRAM COMPLETE: CPT | Performed by: NURSE PRACTITIONER

## 2023-09-12 RX ORDER — AMLODIPINE BESYLATE 10 MG/1
10 TABLET ORAL DAILY
Qty: 90 TABLET | Refills: 0 | Status: SHIPPED | OUTPATIENT
Start: 2023-09-12 | End: 2023-09-12

## 2023-09-12 RX ORDER — VERAPAMIL HYDROCHLORIDE 120 MG/1
120 CAPSULE, EXTENDED RELEASE ORAL AT BEDTIME
Qty: 30 CAPSULE | Refills: 0 | Status: SHIPPED | OUTPATIENT
Start: 2023-09-12 | End: 2023-09-26

## 2023-09-12 NOTE — PATIENT INSTRUCTIONS
Blood pressure:  Stop Amlodipine  Start Verapamil 120 mg daily  Monitor home blood pressure daily for 2 weeks; notify if above goal <140/90    Diabetes  - continue Metformin 1000 mg twice a day  - check blood sugars every morning before breakfast and at bedtime to for 2 weeks

## 2023-09-12 NOTE — PROGRESS NOTES
Assessment & Plan     Type 2 diabetes mellitus with hyperglycemia, without long-term current use of insulin (H)  Hemoglobin A1C   Date Value Ref Range Status   08/07/2023 11.4 (H) 0.0 - 5.6 % Final   08/18/2020 9.0 (H) 0 - 5.6 % Final     Comment:     Normal <5.7% Prediabetes 5.7-6.4%  Diabetes 6.5% or higher - adopted from ADA   consensus guidelines.        Resume metformin 2000 mg daily; start CGM kalpana ;repeat A1c in 3 months; discussed additional therapy    Need for shingles vaccine  - will schedule with pharmacy    Hypertension goal BP (blood pressure) < 140/90  Monitor home blood pressure 2-3 times per week; notify if above goal <130/80; dietary and exercise changes to improve blood pressure  - verapamil ER (VERELAN) 120 MG 24 hr capsul       Complicated UTI (urinary tract infection)  - UA Macroscopic with reflex to Microscopic and Culture - Lab Collect  - UA Macroscopic with reflex to Microscopic and Culture - Lab Collect  - UA Microscopic with Reflex to Culture  - ciprofloxacin (CIPRO) 500 MG tablet  Dispense: 14 tablet; Refill: 0    Elevated prostate specific antigen (PSA)  - PSA, screen  - PSA, screen    Anemia due to stage 3a chronic kidney disease (H)  - CBC with platelets  - Basic metabolic panel  - CBC with platelets  - Basic metabolic panel    Age-related cognitive decline  - Adult Neuropsychology Referral    Tachycardia  - EKG 12-lead complete w/read - Clinics    See full details below; plan transcribed by SUDHEER Mohan    1. Diabetes.  His last A1c was 11.4 in 08/2022. He will continue the metformin. He will check his blood glucose 2 times a day in the morning and at bedtime for 2 weeks. He will bring his blood glucose readings at next visit to review; discussed adding additional therapy for better control  Rybelsus 3 mg once a day vs injectable; high risk uti for jardiance;  will hold start for now; was off metformin for 1-2 months d/t no refills; repeat in 3 months; discussed CGM.    2.  Hypertension.  His blood pressure is not controlled.  We will restart him on verapamil 120 mg 1 tablet in the morning. He will stop amlodipine. He will monitor his blood pressure. If his blood pressure is less than 110/60, he will hold verapamil; repeat BP 1 hour later     3. Urinary tract infection.  We will check his urine today.    4. Memory issues.  Concern for memory decline; repeating questions; not following conversation; fixated/repeating stories about his father; recommend  MoCA test.    5. Elevated PSA.  We will repeat the PSA level today.    6. Sinus tachycardia.  His heart rate was up to 125. He was in sinus rhythm at that time. We will do an EKG today.  We would probably need to do an ultrasound on the heart and see if we need to get him in to see cardiology.    Follow-up  The patient will follow up in 2 weeks.    56 minutes of the appointment were spent evaluating and developing a treatment plan for her additional concern(s).          DANIEL Arita [facility]    Genia Hernandez is a 78 year old, presenting for the following health issues:  Recheck Medication and Diabetes        9/12/2023     2:45 PM   Additional Questions   Roomed by brady   Accompanied by wife       History of Present Illness       Reason for visit:  Discuss restarting veramil    He eats 4 or more servings of fruits and vegetables daily.He exercises with enough effort to increase his heart rate 30 to 60 minutes per day.    He is taking medications regularly.     The patient is a 78-year-old male in clinic for followup of diabetes. He is accompanied by his wife.    DM: He has been taking his metformin 1000 mg twice a day consistently and has been exercising. His blood sugar was 165 and 185 last night. He had another reading of 220 and 184 last week and it started gradually going down. He checks his blood sugars once a day. He is not on glipizide anymore.     HTN: He checks his blood pressure  "once in a while at home. His blood pressure was 144/76 last Friday after medication in the morning. It was 176/97 last Thursday.  He is taking amlodipine 10 mg every morning. He was taken off of verapamil when he got pneumonia in 11/2022 during hospitalization; was not restarted. He denies any irregular heartbeats at that time. His wife states that his blood pressure was elevated when he had pneumonia.    Urinary Retention: He is still on the Mathews catheter. He denies any pain with urination, odor, fever, or chills. He can tell when he is starting to develop a urinary tract infection.    He has an appointment next Tuesday with urology.    He denies any chest pain. He had an EKG done on 08/05/2023 in the ER. He has never been seen by the cardiologist.    He does not have a home health nurse.            Review of Systems         Objective    BP (!) 162/74   Pulse 116   Temp 97.9  F (36.6  C) (Temporal)   Resp 18   Ht 1.82 m (5' 11.65\")   Wt 93.9 kg (207 lb)   SpO2 100%   BMI 28.35 kg/m    Body mass index is 28.35 kg/m .  Physical Exam   GENERAL: alert, elderly, and smells of urine; dishevel   NECK: no adenopathy, no asymmetry, masses, or scars and thyroid normal to palpation  RESP: lungs clear to auscultation - no rales, rhonchi or wheezes  CV: regular rate and rhythm, normal S1 S2, no S3 or S4, no murmur, click or rub, no peripheral edema and peripheral pulses strong                      "

## 2023-09-13 ENCOUNTER — TELEPHONE (OUTPATIENT)
Dept: FAMILY MEDICINE | Facility: CLINIC | Age: 78
End: 2023-09-13

## 2023-09-13 NOTE — TELEPHONE ENCOUNTER
"Yoanna Connors (pt's friend, CTC on file) called stating she and pt was seen by you yesterday and that you verbally instructed pt to take both Verapamil 120 mg and Amlodipine 10 mg. However, she review the AVS this afternoon again and it says to stop taking the amlodipine 10 mg and start Verapamil. She had already given pt amlodipine this morning. Yoanna verbalized \"why would she tell me something then write something different in the document. What if something bad happened, I would have nothing to go by\".      RN advised Yoanna to follow AVS instructions (stop taking amlodipine 10mg and start taking verapamil) until further clarification from provider.    Yoanna Connors will not be available tomorrow so she would appreciate you connecting with her before you leave clinic today to clarify. Yoanna may be reach at [phone].    Nilda DENNISON RN  [facility]    "

## 2023-09-14 LAB
ANION GAP SERPL CALCULATED.3IONS-SCNC: 12 MMOL/L (ref 7–15)
BUN SERPL-MCNC: 17.6 MG/DL (ref 8–23)
CALCIUM SERPL-MCNC: 9.5 MG/DL (ref 8.8–10.2)
CHLORIDE SERPL-SCNC: 99 MMOL/L (ref 98–107)
CREAT SERPL-MCNC: 1.04 MG/DL (ref 0.67–1.17)
DEPRECATED HCO3 PLAS-SCNC: 26 MMOL/L (ref 22–29)
EGFRCR SERPLBLD CKD-EPI 2021: 74 ML/MIN/1.73M2
GLUCOSE SERPL-MCNC: 284 MG/DL (ref 70–99)
POTASSIUM SERPL-SCNC: 4.6 MMOL/L (ref 3.4–5.3)
PSA SERPL DL<=0.01 NG/ML-MCNC: 4.49 NG/ML (ref 0–6.5)
SODIUM SERPL-SCNC: 137 MMOL/L (ref 136–145)

## 2023-09-15 RX ORDER — CIPROFLOXACIN 500 MG/1
500 TABLET, FILM COATED ORAL 2 TIMES DAILY
Qty: 14 TABLET | Refills: 0 | Status: SHIPPED | OUTPATIENT
Start: 2023-09-15 | End: 2023-09-22

## 2023-09-16 NOTE — TELEPHONE ENCOUNTER
Agree with directions relayed by RN.  Attempted to call Yoanna at number on record x4 received busy signal; attempted alternate Tawana Gutierrez. Unable to leave message.     - Start Cipro 500 mg twice a day for 7 days; repeat UA at next visit.    DANIEL Arita CNP

## 2023-09-20 NOTE — TELEPHONE ENCOUNTER
Refills appear to have been addressed by PCP. No further action is needed. Please close this encounter.    Aung Arias, RN, BSN, MSN  RN Lead

## 2023-09-26 ENCOUNTER — OFFICE VISIT (OUTPATIENT)
Dept: FAMILY MEDICINE | Facility: CLINIC | Age: 78
End: 2023-09-26
Payer: COMMERCIAL

## 2023-09-26 VITALS
TEMPERATURE: 97 F | HEART RATE: 117 BPM | HEIGHT: 72 IN | WEIGHT: 208 LBS | SYSTOLIC BLOOD PRESSURE: 125 MMHG | BODY MASS INDEX: 28.17 KG/M2 | RESPIRATION RATE: 20 BRPM | OXYGEN SATURATION: 99 % | DIASTOLIC BLOOD PRESSURE: 80 MMHG

## 2023-09-26 DIAGNOSIS — R41.89 IMPAIRED COGNITION: ICD-10-CM

## 2023-09-26 DIAGNOSIS — N39.0 COMPLICATED UTI (URINARY TRACT INFECTION): ICD-10-CM

## 2023-09-26 DIAGNOSIS — N40.1 BENIGN NON-NODULAR PROSTATIC HYPERPLASIA WITH LOWER URINARY TRACT SYMPTOMS: ICD-10-CM

## 2023-09-26 DIAGNOSIS — I10 HYPERTENSION GOAL BP (BLOOD PRESSURE) < 140/90: Primary | ICD-10-CM

## 2023-09-26 DIAGNOSIS — E11.65 TYPE 2 DIABETES MELLITUS WITH HYPERGLYCEMIA, WITHOUT LONG-TERM CURRENT USE OF INSULIN (H): ICD-10-CM

## 2023-09-26 LAB
ALBUMIN UR-MCNC: >=300 MG/DL
APPEARANCE UR: ABNORMAL
BACTERIA #/AREA URNS HPF: ABNORMAL /HPF
BILIRUB UR QL STRIP: NEGATIVE
COLOR UR AUTO: YELLOW
GLUCOSE UR STRIP-MCNC: >=1000 MG/DL
HGB UR QL STRIP: ABNORMAL
KETONES UR STRIP-MCNC: NEGATIVE MG/DL
LEUKOCYTE ESTERASE UR QL STRIP: ABNORMAL
MUCOUS THREADS #/AREA URNS LPF: PRESENT /LPF
NITRATE UR QL: POSITIVE
PH UR STRIP: 7 [PH] (ref 5–7)
RBC #/AREA URNS AUTO: ABNORMAL /HPF
SP GR UR STRIP: 1.02 (ref 1–1.03)
SQUAMOUS #/AREA URNS AUTO: ABNORMAL /LPF
UROBILINOGEN UR STRIP-ACNC: 1 E.U./DL
WBC #/AREA URNS AUTO: >100 /HPF

## 2023-09-26 PROCEDURE — 99214 OFFICE O/P EST MOD 30 MIN: CPT | Performed by: NURSE PRACTITIONER

## 2023-09-26 PROCEDURE — 87086 URINE CULTURE/COLONY COUNT: CPT | Performed by: NURSE PRACTITIONER

## 2023-09-26 PROCEDURE — 81001 URINALYSIS AUTO W/SCOPE: CPT | Performed by: NURSE PRACTITIONER

## 2023-09-26 RX ORDER — VERAPAMIL HYDROCHLORIDE 120 MG/1
120 CAPSULE, EXTENDED RELEASE ORAL AT BEDTIME
Qty: 90 CAPSULE | Refills: 3 | Status: SHIPPED | OUTPATIENT
Start: 2023-09-26 | End: 2024-10-01

## 2023-09-26 RX ORDER — CIPROFLOXACIN 500 MG/1
500 TABLET, FILM COATED ORAL 2 TIMES DAILY
Qty: 20 TABLET | Refills: 0 | Status: SHIPPED | OUTPATIENT
Start: 2023-09-26 | End: 2023-10-06

## 2023-09-26 RX ORDER — VERAPAMIL HYDROCHLORIDE 180 MG/1
180 CAPSULE, EXTENDED RELEASE ORAL AT BEDTIME
Qty: 90 CAPSULE | Refills: 3 | Status: SHIPPED | OUTPATIENT
Start: 2023-09-26 | End: 2023-09-26

## 2023-09-26 NOTE — PATIENT INSTRUCTIONS
For your blood pressure:  Check your blood pressure once a day after medication   but you should be sitting restfully for ~10 minutes before you take it.  Note your blood pressure on a paper log or on your phone  In ~4 weeks follow up       UTI  - start cipro 500 mg twice a day 10 days    Blood sugar  Continuous glucose monitor

## 2023-09-26 NOTE — PROGRESS NOTES
"  Assessment & Plan     Hypertension goal BP (blood pressure) < 140/90  Controlled; home bp readings within optimal control; 120-130s/76s; manual clinic reading within goal; discussed continuing continued dose of 120 mg vs increase to 180 mg;   - Monitor home blood pressure daily;  goal <130/80;   - continue verapamil ER (VERELAN) 120 MG 24 hr capsule  Dispense: 90 capsule; Refill: 3    Complicated UTI (urinary tract infection)  UA RESULTS:  Recent Labs   Lab Test 09/26/23  1549   COLOR Yellow   APPEARANCE Cloudy*   URINEGLC >=1000*   URINEBILI Negative   URINEKETONE Negative   SG 1.020   UBLD Large*   URINEPH 7.0   PROTEIN >=300*   UROBILINOGEN 1.0   NITRITE Positive*   LEUKEST Large*   RBCU 5-10*   WBCU >100*   - UA Macroscopic with reflex to Microscopic and Culture - Lab Collect  - UA Macroscopic with reflex to Microscopic and Culture - Lab Collect  - Urine Microscopic Exam  - Urine Culture  - ciprofloxacin (CIPRO) 500 MG tablet  Dispense: 20 tablet; Refill: 0  - Home Care Referral    Type 2 diabetes mellitus with hyperglycemia, without long-term current use of insulin (H)  Continue Metformin 2000 mg daily; was off metformin; recently  resumed;  placed CGM sensor today in clinic; educated on use; discussed additional therapy if no improvement in 3 months;  - Home Care Referral    Benign non-nodular prostatic hyperplasia with lower urinary tract symptoms  - following Urology; indwelling catheter; recent visit to  for catheter change  - needs supplies  - encouraged HHN to evaluate home needs, catheter teaching, DM  and HTN  monitoring; patient agreeable     Impaired cognition  - pending Neuropsych referral   - Home Care Referral      BMI:   Estimated body mass index is 28.48 kg/m  as calculated from the following:    Height as of this encounter: 1.82 m (5' 11.65\").    Weight as of this encounter: 94.3 kg (208 lb).   Weight management plan: Discussed healthy diet and exercise guidelines    FUTURE APPOINTMENTS:     "   - Follow-up visit in 4 weeks    DANIEL Arita CNP [facility]    Genia Hernandez is a 78 year old, presenting for the following health issues:  Follow Up (Hypertension/diabetes)        9/26/2023     2:36 PM   Additional Questions   Roomed by brady   Accompanied by ziggy       History of Present Illness       Diabetes:   He presents for follow up of diabetes.  He is checking home blood glucose one time daily.   He checks blood glucose before meals and at bedtime.  Blood glucose is never over 200 and never under 70. He is aware of hypoglycemia symptoms including none.    He has no concerns regarding his diabetes at this time.   He is not experiencing numbness or burning in feet, excessive thirst, blurry vision, weight changes or redness, sores or blisters on feet.           He eats 2-3 servings of fruits and vegetables daily.He consumes 0 sweetened beverage(s) daily.He exercises with enough effort to increase his heart rate 20 to 29 minutes per day.  He exercises with enough effort to increase his heart rate 7 days per week.   He is taking medications regularly.     In clinic for follow up htn restarted Verapamil 120 mg at last visit; encouraged to monitor home BP's; DM pending CGM and UA culture positive; since last visit patient was seen at outside facility  for catheter change ; patient and spouse out of stock for supplies reports calling Urology team w/o assistance;     Urinary Retention: following Urology; has indwelling catheter; recently seen at outside [facility] system for catheter change; spouse acknowledges diff with getting supplies from Urology team; catheter change at visit on 9/18 w/ HP ; given additional supplies     - HTN: home BP readings improved 130-low 140s/70s with stopping amlodipine starting verapamil 120 mg; questions increasing to previous dose of 180 mg; repeat manual BP well within goal    -DM: A1c remains above goal; plan to continue metformin for 3  "months ; as patient was off medication and recheck if remains above goal <8%; will add additional therapy; has CGM with patient today for placement    - Cognitive decline: disheveled appearance; patient tracking clearing today answering questions appropriately; less distraction ;  will continue with neuropsychic testing     - HHN: discussed having HHN visit to follow up on catheter changes; BP; DM and needed supplies ; patient agreeable       Review of Systems         Objective    /80   Pulse 117   Temp 97  F (36.1  C) (Temporal)   Resp 20   Ht 1.82 m (5' 11.65\")   Wt 94.3 kg (208 lb)   SpO2 99%   BMI 28.48 kg/m    Body mass index is 28.48 kg/m .  Physical Exam   GENERAL: healthy, alert and no distress  GENERAL: healthy, alert, no distress, and disheveled; malodorous smell   RESP: lungs clear to auscultation - no rales, rhonchi or wheezes  CV: regular rate and rhythm, normal S1 S2, no S3 or S4, no murmur, click or rub, no peripheral edema and peripheral pulses strong      Results for orders placed or performed in visit on 09/26/23   UA Macroscopic with reflex to Microscopic and Culture - Lab Collect     Status: Abnormal    Specimen: Urine, Midstream   Result Value Ref Range    Color Urine Yellow Colorless, Straw, Light Yellow, Yellow    Appearance Urine Cloudy (A) Clear    Glucose Urine >=1000 (A) Negative mg/dL    Bilirubin Urine Negative Negative    Ketones Urine Negative Negative mg/dL    Specific Gravity Urine 1.020 1.003 - 1.035    Blood Urine Large (A) Negative    pH Urine 7.0 5.0 - 7.0    Protein Albumin Urine >=300 (A) Negative mg/dL    Urobilinogen Urine 1.0 0.2, 1.0 E.U./dL    Nitrite Urine Positive (A) Negative    Leukocyte Esterase Urine Large (A) Negative   Urine Microscopic Exam     Status: Abnormal   Result Value Ref Range    Bacteria Urine Few (A) None Seen /HPF    RBC Urine 5-10 (A) 0-2 /HPF /HPF    WBC Urine >100 (A) 0-5 /HPF /HPF    Squamous Epithelials Urine Few (A) None Seen /LPF    " Mucus Urine Present (A) None Seen /LPF

## 2023-09-27 NOTE — RESULT ENCOUNTER NOTE
Results discussed directly with patient while patient was present. Any further details documented in the note.   DANIEL Arita CNP

## 2023-09-28 LAB — BACTERIA SPEC CULT: NORMAL

## 2023-11-07 ENCOUNTER — OFFICE VISIT (OUTPATIENT)
Dept: FAMILY MEDICINE | Facility: CLINIC | Age: 78
End: 2023-11-07

## 2023-11-07 VITALS
TEMPERATURE: 98 F | HEIGHT: 72 IN | SYSTOLIC BLOOD PRESSURE: 184 MMHG | RESPIRATION RATE: 15 BRPM | DIASTOLIC BLOOD PRESSURE: 85 MMHG | WEIGHT: 207 LBS | OXYGEN SATURATION: 98 % | BODY MASS INDEX: 28.04 KG/M2 | HEART RATE: 53 BPM
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
## 2023-11-07 DIAGNOSIS — E11.65 TYPE 2 DIABETES MELLITUS WITH HYPERGLYCEMIA, WITHOUT LONG-TERM CURRENT USE OF INSULIN (H): ICD-10-CM

## 2023-11-07 DIAGNOSIS — I10 HYPERTENSION GOAL BP (BLOOD PRESSURE) < 140/90: ICD-10-CM

## 2023-11-07 DIAGNOSIS — E78.5 HYPERLIPIDEMIA WITH TARGET LDL LESS THAN 100: ICD-10-CM

## 2023-11-07 DIAGNOSIS — Z23 HIGH PRIORITY FOR 2019-NCOV VACCINE: ICD-10-CM

## 2023-11-07 DIAGNOSIS — N18.31 ANEMIA DUE TO STAGE 3A CHRONIC KIDNEY DISEASE (H): ICD-10-CM

## 2023-11-07 DIAGNOSIS — N18.2 CHRONIC KIDNEY DISEASE, STAGE 2 (MILD): ICD-10-CM

## 2023-11-07 DIAGNOSIS — D63.1 ANEMIA DUE TO STAGE 3A CHRONIC KIDNEY DISEASE (H): ICD-10-CM

## 2023-11-07 DIAGNOSIS — R33.9 URINARY RETENTION: ICD-10-CM

## 2023-11-07 LAB
ALBUMIN SERPL BCG-MCNC: 3.9 G/DL (ref 3.5–5.2)
ALBUMIN UR-MCNC: >=300 MG/DL
ALP SERPL-CCNC: 107 U/L (ref 40–129)
ALT SERPL W P-5'-P-CCNC: 13 U/L (ref 0–70)
AMORPH CRY #/AREA URNS HPF: ABNORMAL /HPF
ANION GAP SERPL CALCULATED.3IONS-SCNC: 11 MMOL/L (ref 7–15)
APPEARANCE UR: ABNORMAL
AST SERPL W P-5'-P-CCNC: 17 U/L (ref 0–45)
BILIRUB SERPL-MCNC: 0.5 MG/DL
BILIRUB UR QL STRIP: NEGATIVE
BUN SERPL-MCNC: 13.4 MG/DL (ref 8–23)
CA PHOS CRY #/AREA URNS HPF: ABNORMAL /HPF
CALCIUM SERPL-MCNC: 9.1 MG/DL (ref 8.8–10.2)
CHLORIDE SERPL-SCNC: 99 MMOL/L (ref 98–107)
COLOR UR AUTO: YELLOW
CREAT SERPL-MCNC: 1.12 MG/DL (ref 0.67–1.17)
DEPRECATED HCO3 PLAS-SCNC: 24 MMOL/L (ref 22–29)
EGFRCR SERPLBLD CKD-EPI 2021: 68 ML/MIN/1.73M2
ERYTHROCYTE [DISTWIDTH] IN BLOOD BY AUTOMATED COUNT: 13.1 % (ref 10–15)
FERRITIN SERPL-MCNC: 176 NG/ML (ref 31–409)
GLUCOSE SERPL-MCNC: 367 MG/DL (ref 70–99)
GLUCOSE UR STRIP-MCNC: >=1000 MG/DL
HCT VFR BLD AUTO: 40.9 % (ref 40–53)
HGB BLD-MCNC: 13.2 G/DL (ref 13.3–17.7)
HGB UR QL STRIP: ABNORMAL
IRON BINDING CAPACITY (ROCHE): 205 UG/DL (ref 240–430)
IRON SATN MFR SERPL: 33 % (ref 15–46)
IRON SERPL-MCNC: 68 UG/DL (ref 61–157)
KETONES UR STRIP-MCNC: NEGATIVE MG/DL
LEUKOCYTE ESTERASE UR QL STRIP: ABNORMAL
MCH RBC QN AUTO: 28.6 PG (ref 26.5–33)
MCHC RBC AUTO-ENTMCNC: 32.3 G/DL (ref 31.5–36.5)
MCV RBC AUTO: 89 FL (ref 78–100)
NITRATE UR QL: NEGATIVE
PH UR STRIP: 8.5 [PH] (ref 5–7)
PLATELET # BLD AUTO: 366 10E3/UL (ref 150–450)
POTASSIUM SERPL-SCNC: 4.7 MMOL/L (ref 3.4–5.3)
PROT SERPL-MCNC: 7.4 G/DL (ref 6.4–8.3)
RBC # BLD AUTO: 4.62 10E6/UL (ref 4.4–5.9)
RBC #/AREA URNS AUTO: ABNORMAL /HPF
SODIUM SERPL-SCNC: 134 MMOL/L (ref 135–145)
SP GR UR STRIP: 1.01 (ref 1–1.03)
UROBILINOGEN UR STRIP-ACNC: 0.2 E.U./DL
WBC # BLD AUTO: 6.5 10E3/UL (ref 4–11)
WBC #/AREA URNS AUTO: ABNORMAL /HPF

## 2023-11-07 PROCEDURE — 87086 URINE CULTURE/COLONY COUNT: CPT | Performed by: NURSE PRACTITIONER

## 2023-11-07 PROCEDURE — 99215 OFFICE O/P EST HI 40 MIN: CPT | Mod: 25 | Performed by: NURSE PRACTITIONER

## 2023-11-07 PROCEDURE — 36415 COLL VENOUS BLD VENIPUNCTURE: CPT | Performed by: NURSE PRACTITIONER

## 2023-11-07 PROCEDURE — 85027 COMPLETE CBC AUTOMATED: CPT | Performed by: NURSE PRACTITIONER

## 2023-11-07 PROCEDURE — 80053 COMPREHEN METABOLIC PANEL: CPT | Performed by: NURSE PRACTITIONER

## 2023-11-07 PROCEDURE — 90471 IMMUNIZATION ADMIN: CPT | Performed by: NURSE PRACTITIONER

## 2023-11-07 PROCEDURE — 90480 ADMN SARSCOV2 VAC 1/ONLY CMP: CPT | Performed by: NURSE PRACTITIONER

## 2023-11-07 PROCEDURE — 83540 ASSAY OF IRON: CPT | Performed by: NURSE PRACTITIONER

## 2023-11-07 PROCEDURE — 83550 IRON BINDING TEST: CPT | Performed by: NURSE PRACTITIONER

## 2023-11-07 PROCEDURE — 91320 SARSCV2 VAC 30MCG TRS-SUC IM: CPT | Performed by: NURSE PRACTITIONER

## 2023-11-07 PROCEDURE — 81001 URINALYSIS AUTO W/SCOPE: CPT | Performed by: NURSE PRACTITIONER

## 2023-11-07 PROCEDURE — 82728 ASSAY OF FERRITIN: CPT | Performed by: NURSE PRACTITIONER

## 2023-11-07 PROCEDURE — 90662 IIV NO PRSV INCREASED AG IM: CPT | Performed by: NURSE PRACTITIONER

## 2023-11-07 RX ORDER — SIMVASTATIN 20 MG
TABLET ORAL
Qty: 90 TABLET | Refills: 0 | Status: SHIPPED | OUTPATIENT
Start: 2023-11-07 | End: 2024-04-30

## 2023-11-07 RX ORDER — CARVEDILOL 12.5 MG/1
12.5 TABLET ORAL 2 TIMES DAILY WITH MEALS
Qty: 180 TABLET | Refills: 3 | Status: SHIPPED | OUTPATIENT
Start: 2023-11-07

## 2023-11-07 ASSESSMENT — PAIN SCALES - GENERAL: PAINLEVEL: NO PAIN (0)

## 2023-11-07 NOTE — PROGRESS NOTES
Assessment & Plan     Fall, initial encounter  Stable; no additional falls; discussed home health referral for safety evaluation; Physical Therapy /Occupational Therapy     Hyperlipidemia with target LDL less than 100  The 10-year ASCVD risk score (Stephanie OWUSU, et al., 2019) is: 41.2%    Values used to calculate the score:      Age: 78 years      Sex: Male      Is Non- : Yes      Diabetic: Yes      Tobacco smoker: No      Systolic Blood Pressure: 135 mmHg      Is BP treated: Yes      HDL Cholesterol: 36 mg/dL      Total Cholesterol: 132 mg/dL  - simvastatin (ZOCOR) 20 MG tablet  Dispense: 90 tablet; Refill: 0    Type 2 diabetes mellitus with hyperglycemia, without long-term current use of insulin (H)  A1c improving trending down 11% to 9%;   Trial CGM  - metFORMIN (GLUCOPHAGE) 1000 MG tablet  Dispense: 180 tablet; Refill: 1    Hypertension goal BP (blood pressure) < 140/90  Subooptimal control; recently added Coreg 12.5 mg BID;   - carvedilol (COREG) 12.5 MG tablet  Dispense: 180 tablet; Refill: 3  - Comprehensive metabolic panel  For your blood pressure:  Check your blood pressure once a day  It can be at different times of day, but you should be sitting restfully for ~10 minutes before you take it.  Note your blood pressure on a paper log or on your phone  In ~2 weeks, send me a message on [place] with your results.  Your goal is <140/90, even better is <130/80.  Low sodium, high potassium (DASH) diet.      Chronic kidney disease, stage 2 (mild)  Stable; continue to montior  - Comprehensive metabolic panel  - Comprehensive metabolic panel    Anemia due to stage 3a chronic kidney disease (H)  -CBC with platelets  - Ferritin  - Iron & Iron Binding Capacity  - CBC with platelets  - Ferritin  - Iron & Iron Binding Capacity    Urinary retention  Managed by Urology; indwelling catheter intact; continue monitor for infections;  - UA Macroscopic with reflex to Microscopic and Culture  - UA  Macroscopic with reflex to Microscopic and Culture  - UA Microscopic with Reflex to Culture  - Urine Culture    High priority for 2019-nCoV vaccine  - received vaccine today     45 minutes of the appointment were spent evaluating and developing a treatment plan for her additional concern(s).               Blood sugar testing frequency justification:  Uncontrolled diabetes      DANIEL Arita CNP  [facility]    Genia Hernandez is a 78 year old, presenting for the following health issues:  Diabetes (Diabetic Check ) and Hypertension (Hypertension )      11/7/2023     2:47 PM   Additional Questions   Roomed by Danyell Beauchamp   Accompanied by Yoanna - Partner         11/7/2023     2:47 PM   Patient Reported Additional Medications   Patient reports taking the following new medications Med Rec Med       History of Present Illness       Diabetes:   He presents for follow up of diabetes.  He is checking home blood glucose two times daily.   He checks blood glucose before and after meals.  Blood glucose is never over 200 and never under 70. He is aware of hypoglycemia symptoms including none.    He has no concerns regarding his diabetes at this time.   He is not experiencing numbness or burning in feet, excessive thirst, blurry vision, weight changes or redness, sores or blisters on feet.           Hypertension: He presents for follow up of hypertension.  He does check blood pressure  regularly outside of the clinic. Outpatient blood pressures have not been over 140/90. He follows a low salt diet.     He eats 2-3 servings of fruits and vegetables daily.He consumes 2 sweetened beverage(s) daily.He exercises with enough effort to increase his heart rate 20 to 29 minutes per day.  He exercises with enough effort to increase his heart rate 4 days per week.   He is taking medications regularly.     - Fall: seen in ED on 10/15 d/t fall; fell in [employer] parking lot hitting his head; no LOC; loose  tooth; CT head and spine and facial bone  wnl; discharged home; patient returned on 10/16 w/elevated blood pressure, weakness, tachycardia; started on Coreg with improved HR;   - patient slip on paper and fell in parking lot     IMPRESSION:   HEAD CT:   1.  No acute intracranial hemorrhage or calvarial fracture.   2.  Mild to moderate volume loss and presumed chronic small vessel ischemic changes.     FACIAL BONE CT:   1.  Images below the level of the orbits are near nondiagnostic secondary to excessive motion.   2.  Within this limitation, no obvious acute facial bone fracture.     CERVICAL SPINE CT:   1.  No fracture or posttraumatic subluxation.   2.  No high-grade spinal canal or neural foraminal stenosis     Echo:  Summary    1. Sinus tachycardia during study.     2. Normal LV size with mildly increased wall thickness. Visually estimated   LVEF 50-55%. No regional wall motion abnormalities. (low normal function).   Indeterminate diastolic function.     3. Normal RV size and systolic function.     4. Mild LA enlargement.     5. No significant valvular abnormalities.       - DM: metformin only, denies insulin and glipizide   Bgs fasting 178     - HTN: wife states he is taking verpamil, new coreg; reports not taking triamtrene; amlodipine  Home blood pressure   136/74  - Urinary retention: flomax   - HLD: simvastatin               Review of Systems         Objective    BP (!) 184/85 (BP Location: Right arm, Patient Position: Sitting, Cuff Size: Adult Large)   Pulse 53   Temp 98  F (36.7  C) (Temporal)   Resp 15   Ht 1.829 m (6')   Wt 93.9 kg (207 lb)   SpO2 98%   BMI 28.07 kg/m    Body mass index is 28.07 kg/m .  Physical Exam

## 2023-11-07 NOTE — PATIENT INSTRUCTIONS
NO CHANGES TO MEDICATION TODAY  - BRING ALL MEDICATION BOTTLES TO NEXT APPOINTMENT    - CHECK BLOOD PRESSURE BEFORE AND 2 HOURS AFTER MEDICATION     - WRITE DOWN BGS FASTING, LUNCH AND BEDTIME FOR REVIEW

## 2023-11-09 LAB — BACTERIA UR CULT: NORMAL

## 2023-11-14 ENCOUNTER — OFFICE VISIT (OUTPATIENT)
Dept: FAMILY MEDICINE | Facility: CLINIC | Age: 78
End: 2023-11-14

## 2023-11-14 ENCOUNTER — TELEPHONE (OUTPATIENT)
Dept: FAMILY MEDICINE | Facility: CLINIC | Age: 78
End: 2023-11-14

## 2023-11-14 VITALS
OXYGEN SATURATION: 100 % | TEMPERATURE: 97.5 F | DIASTOLIC BLOOD PRESSURE: 72 MMHG | WEIGHT: 207 LBS | HEIGHT: 71 IN | BODY MASS INDEX: 28.98 KG/M2 | HEART RATE: 93 BPM | SYSTOLIC BLOOD PRESSURE: 130 MMHG

## 2023-11-14 DIAGNOSIS — Z29.11 NEED FOR VACCINATION AGAINST RESPIRATORY SYNCYTIAL VIRUS: ICD-10-CM

## 2023-11-14 DIAGNOSIS — I10 HYPERTENSION GOAL BP (BLOOD PRESSURE) < 140/90: ICD-10-CM

## 2023-11-14 DIAGNOSIS — E87.1 HYPONATREMIA: ICD-10-CM

## 2023-11-14 DIAGNOSIS — L60.2 ONYCHAUXIS: ICD-10-CM

## 2023-11-14 DIAGNOSIS — Z23 NEED FOR SHINGLES VACCINE: ICD-10-CM

## 2023-11-14 DIAGNOSIS — E11.65 TYPE 2 DIABETES MELLITUS WITH HYPERGLYCEMIA, WITHOUT LONG-TERM CURRENT USE OF INSULIN (H): Primary | ICD-10-CM

## 2023-11-14 LAB — HBA1C MFR BLD: 9.9 % (ref 0–5.6)

## 2023-11-14 PROCEDURE — 99214 OFFICE O/P EST MOD 30 MIN: CPT | Performed by: NURSE PRACTITIONER

## 2023-11-14 PROCEDURE — 80048 BASIC METABOLIC PNL TOTAL CA: CPT | Performed by: NURSE PRACTITIONER

## 2023-11-14 PROCEDURE — 83036 HEMOGLOBIN GLYCOSYLATED A1C: CPT | Performed by: NURSE PRACTITIONER

## 2023-11-14 PROCEDURE — 36415 COLL VENOUS BLD VENIPUNCTURE: CPT | Performed by: NURSE PRACTITIONER

## 2023-11-14 RX ORDER — VERAPAMIL HYDROCHLORIDE 40 MG/1
TABLET ORAL
Qty: 45 TABLET | Refills: 0 | Status: SHIPPED | OUTPATIENT
Start: 2023-11-14 | End: 2024-01-09

## 2023-11-14 RX ORDER — RESPIRATORY SYNCYTIAL VIRUS VACCINE 120MCG/0.5
0.5 KIT INTRAMUSCULAR ONCE
Qty: 1 EACH | Refills: 0 | Status: CANCELLED | OUTPATIENT
Start: 2023-11-14 | End: 2023-11-14

## 2023-11-14 NOTE — TELEPHONE ENCOUNTER
PRIOR AUTHORIZATION DENIED    Medication: FREESTYLE [place] 2 SENSOR [facility]  [place] Company: [employer] - Phone [phone] Fax [phone]   Denial Date: 11/14/2023  Denial Rational:     Appeal Information:     Patient Notified: No

## 2023-11-14 NOTE — PATIENT INSTRUCTIONS
Verapamil 40 mg tablet-Take 20 mg (half -tablet) every morning with Verapamil  mg for total of 140 mg daily    Stay the same on the Carvedilol 12.5 mg twice a day    For your blood pressure:  Check your blood pressure before and 2 hours after taking medication  you should be sitting restfully for ~10 minutes before you take it.  Note your blood pressure on a paper log or on your phone  In ~2 weeks phone visit with me to review results.  Your goal is <140/90, Low sodium, high potassium (DASH) diet.      DM:   - start Jardiance 10 mg 1 tablet daily  - continue continuous meter    Return in 2 weeks

## 2023-11-14 NOTE — PROGRESS NOTES
Assessment & Plan     Type 2 diabetes mellitus with hyperglycemia, without long-term current use of insulin (H)  Home readings fasting above @160-170; pp with goal <180; A1C 11.4 (9/2023); recheck A1c today; readings suggest reduction to ~9-10% will start jardiance 10 mg daily; discussed UTI concerns will monitor closely;   - HEMOGLOBIN A1C  - empagliflozin (JARDIANCE) 10 MG TABS tablet  Dispense: 90 tablet; Refill: 1  - Basic metabolic panel      Hypertension goal BP (blood pressure) < 140/90  Hyponatremia   Suboptimal control; home readings above goal; add verapamil 20 mg daily for total 140 mg; check home readings daily; follow up in 2 weeks   - verapamil (CALAN) 40 MG tablet  Dispense: 45 tablet; Refill: 0  - Basic metabolic panel  - Basic metabolic panel    Onychauxis  - Orthopedic referral     Need for shingles vaccine  Need for vaccination against respiratory syncytial virus  Will schedule w/pharmacy d/t copay    Patient Instructions   Verapamil 40 mg tablet-Take 20 mg (half -tablet) every morning with Verapamil  mg for total of 140 mg daily    Stay the same on the Carvedilol 12.5 mg twice a day    For your blood pressure:  Check your blood pressure before and 2 hours after taking medication  you should be sitting restfully for ~10 minutes before you take it.  Note your blood pressure on a paper log or on your phone  In ~2 weeks phone visit with me to review results.  Your goal is <140/90, Low sodium, high potassium (DASH) diet.      DM:   - start Jardiance 10 mg 1 tablet daily  - continue continuous meter    Return in 2 weeks       DANIEL Arita [facility]    Genia Hernandez is a 78 year old, presenting for the following health issues:  Follow Up, Hypertension, and Diabetes        11/14/2023     2:00 PM   Additional Questions   Roomed by brady   Accompanied by wife       History of Present Illness       Diabetes:   He presents for follow up of diabetes.   "He is checking home blood glucose two times daily.   He checks blood glucose before and after meals.  Blood glucose is never over 200 and never under 70. He is aware of hypoglycemia symptoms including none.    He has no concerns regarding his diabetes at this time.   He is not experiencing numbness or burning in feet, excessive thirst, blurry vision, weight changes or redness, sores or blisters on feet.           Hypertension: He presents for follow up of hypertension.  He does check blood pressure  regularly outside of the clinic. Outpatient blood pressures have not been over 140/90. He follows a low salt diet.     He eats 2-3 servings of fruits and vegetables daily.He consumes 2 sweetened beverage(s) daily.He exercises with enough effort to increase his heart rate 20 to 29 minutes per day.  He exercises with enough effort to increase his heart rate 4 days per week.   He is taking medications regularly.     DM: home Bgs w/CCG above goal fastings; pp within goal; no lows    HTN: home readings after verapamil 120 mg and coreg 12.5 mg; remains above goal;     HH: referral placed ; not initiated at this time; will follow up    Review of Systems         Objective    /72 (BP Location: Right arm, Patient Position: Sitting, Cuff Size: Adult Regular)   Pulse 93   Temp 97.5  F (36.4  C) (Temporal)   Ht 1.815 m (5' 11.46\")   Wt 93.9 kg (207 lb)   SpO2 100%   BMI 28.50 kg/m    Body mass index is 28.5 kg/m .  Physical Exam                         "

## 2023-11-15 LAB
ANION GAP SERPL CALCULATED.3IONS-SCNC: 12 MMOL/L (ref 7–15)
BUN SERPL-MCNC: 14.1 MG/DL (ref 8–23)
CALCIUM SERPL-MCNC: 9.2 MG/DL (ref 8.8–10.2)
CHLORIDE SERPL-SCNC: 98 MMOL/L (ref 98–107)
CREAT SERPL-MCNC: 1.2 MG/DL (ref 0.67–1.17)
DEPRECATED HCO3 PLAS-SCNC: 25 MMOL/L (ref 22–29)
EGFRCR SERPLBLD CKD-EPI 2021: 63 ML/MIN/1.73M2
GLUCOSE SERPL-MCNC: 383 MG/DL (ref 70–99)
POTASSIUM SERPL-SCNC: 4.5 MMOL/L (ref 3.4–5.3)
SODIUM SERPL-SCNC: 135 MMOL/L (ref 135–145)

## 2023-11-28 ENCOUNTER — MEDICAL CORRESPONDENCE (OUTPATIENT)
Dept: HEALTH INFORMATION MANAGEMENT | Facility: CLINIC | Age: 78
End: 2023-11-28

## 2023-11-28 ENCOUNTER — OFFICE VISIT (OUTPATIENT)
Dept: FAMILY MEDICINE | Facility: CLINIC | Age: 78
End: 2023-11-28

## 2023-11-28 VITALS
BODY MASS INDEX: 27.63 KG/M2 | HEIGHT: 72 IN | OXYGEN SATURATION: 100 % | WEIGHT: 204 LBS | TEMPERATURE: 97.7 F | HEART RATE: 97 BPM | SYSTOLIC BLOOD PRESSURE: 135 MMHG | RESPIRATION RATE: 15 BRPM | DIASTOLIC BLOOD PRESSURE: 82 MMHG

## 2023-11-28 DIAGNOSIS — E11.65 TYPE 2 DIABETES MELLITUS WITH HYPERGLYCEMIA, WITHOUT LONG-TERM CURRENT USE OF INSULIN (H): Primary | ICD-10-CM

## 2023-11-28 DIAGNOSIS — I10 HYPERTENSION GOAL BP (BLOOD PRESSURE) < 140/90: ICD-10-CM

## 2023-11-28 PROCEDURE — 99214 OFFICE O/P EST MOD 30 MIN: CPT | Performed by: NURSE PRACTITIONER

## 2023-11-28 ASSESSMENT — PAIN SCALES - GENERAL: PAINLEVEL: NO PAIN (0)

## 2023-11-28 NOTE — COMMUNITY RESOURCES LIST (ENGLISH)
11/28/2023   [facility]  N/A  For questions about this resource list or additional care needs, please contact your primary care clinic or care manager.  Phone: [phone]   Email: N/A   Address: [address] [place] [address]   Hours: N/A        Hotlines and Helplines       Hotline - Housing crisis  1  [facility] Distance: 5.34 miles      Phone/Virtual   [address] [place] [address]  Language: English  Hours: Mon - Sun Open 24 Hours   Phone: (633) 351-9649 Email: communications@[facility].org Website: https://[facility].org/oursaviourshousing/     2  [facility] Distance: 6.94 miles      Phone/Virtual   [address] [place] [address]  Language: English  Hours: Mon - Sun Open 24 Hours   Phone: (713) 492-4573 Email: info@[facility].org Website: http://www.[facility].org          Housing       Coordinated Entry access point  3  [facility] - Coordinated Access to Housing and Shelter ([facility]) - Coordinated Access - Coordinated Entry access point Distance: 0.24 miles      In-Person, Phone/Virtual   [address] [place] [address]  Language: English  Hours: Mon - Fri 8:00 AM - 4:30 PM  Fees: Free   Phone: (928) 354-8648 Website: https://www.[facility]./residents/assistance-support/assistance/housing-services-support     4  [facility] Distance: 2.65 miles      In-Person, Phone/Virtual   [address] [place] [address]  Language: English, American  Hours: Mon - Fri 8:30 AM - 4:30 PM  Fees: Free   Phone: (321) 406-3435 Email: info@[facility].org Website: https://www.[facility].org/locations/[facility]-clinic/     Drop-in center or day shelter  5  [facility] Distance: 3.83 miles      In-Person   [address] [place] [address]  Language: English  Hours: Mon - Fri 9:00 AM - 4:00 PM  Fees: Free   Phone: (954) 431-6668 Email:  jony@[employer]house.org Website: http://[employer].org     6  [facility] Distance: 5.41 miles      In-Person   [address] [place] [address]  Language: English, Kyrgyz, Andorran  Hours: Mon - Sat 7:00 AM - 3:00 PM  Fees: Free, Self Pay   Phone: (860) 654-4490 Email: info@[employer] Website: https://www.[employer]/locations/opportunity-center/     Housing search assistance  7  [facility] - Mental Health Crisis Housing Search Assistance Distance: 1.35 miles      In-Person, Phone/Virtual   [address] [place] [address]  Language: English  Hours: Mon - Tue 8:00 AM - 4:30 PM , Wed 8:00 AM - 6:00 PM , Thu - Fri 8:00 AM - 4:30 PM  Fees: Free   Phone: (700) 721-4985 Email: [facility]. Website: https://www.[facility]./residents/health-medical/clinics-services/mental-health/adult-mental-health     8  [facility] - Online Housing Search Assistance Distance: 3.21 miles      Phone/Virtual   [address] [place] [address]  Language: English  Hours: Mon - Sun Open 24 Hours  Fees: Free   Phone: (630) 554-6706 Email: denise@[employer] Website: https://[employer].org/     Shelter for families  9  [facility] Distance: 15.83 miles      In-Person   [address] [place] [address]  Language: English  Hours: Mon - Fri 3:00 PM - 9:00 AM , Sat - Sun Open 24 Hours  Fees: Free   Phone: (623) 945-6122 Ext.1 Website: https://www.[facility].org/2020/07/03/emergency-family-shelter/     Shelter for individuals  10  [facility] - Coordinated Access to Housing and Shelter ([facility]) - Coordinated Access - Emergency housing Distance: 0.24 miles      In-Person, Phone/Virtual   [address] [place] [address]  Language: English  Hours: Mon - Fri 8:00 AM - 4:30 PM  Fees: Free   Phone: (777)  539-6594 Website: https://www.[facility]./residents/assistance-support/assistance/housing-services-support     11  [facility] [place] [facility] [place] [facility] Distance: 2.57 miles      In-Person   [address] [place] [address]  Language: English  Hours: Mon - Sun 5:00 PM - 10:00 AM  Fees: Free, Self Pay   Phone: (289) 174-5414 Email: info@[employer] Website: https://www.[employer]/locations/[facility]-[place]/          Important Numbers & Websites       Emergency Services   911  [facility]   311  Poison Control   (978) 104-8293  Suicide Prevention Lifeline   (453) 143-9985 (TALK)  Child Abuse Hotline   (920) 699-1436 (4-A-Child)  Sexual Assault Hotline   (117) 121-1829 (HOPE)  National Runaway Safeline   (435) 374-6295 (RUNAWAY)  All-Options Talkline   (495) 390-7349  Substance Abuse Referral   (162) 635-1267 (HELP)

## 2023-11-28 NOTE — PROGRESS NOTES
Assessment & Plan     Type 2 diabetes mellitus with hyperglycemia, without long-term current use of insulin (H)  Lab Results   Component Value Date    A1C 9.9 11/14/2023    A1C 11.4 08/07/2023    A1C 11.1 07/14/2023    A1C 9.4 07/11/2022    A1C 9.0 08/18/2020    A1C 7.5 09/23/2019    A1C 10.4 02/05/2018    A1C 9.2 08/22/2017    A1C 13.4 03/31/2017     Improving; working with MTM to assist with CGM approval; continue current regimen    Hypertension goal BP (blood pressure) < 140/90  - controlled; Stable; continue current regimen; renewed medicatio      Genia Hernandez is a 78 year old, presenting for the following health issues:  Hypertension (Hypertension ) and Prior Auth - Medication (Prior Auth )      11/28/2023     1:40 PM   Additional Questions   Roomed by Danyell Beauchamp   Accompanied by Yoanna     78 year old  year old male with [facility]   Patient Active Problem List   Diagnosis Code    Hyperlipidemia with target LDL less than 100 E78.5    Type 2 diabetes, HbA1c goal < 7% (H) E11.9    Hypertension goal BP (blood pressure) < 140/90 I10    Advanced directives, counseling/discussion Z71.89    Type 2 diabetes mellitus with hyperglycemia (H) E11.65    Chronic gingivitis K05.10    Chronic kidney disease, stage 2 (mild) N18.2     in clinic for acute office visit related to/establish care/to discuss   Follow up DM/HTN    - DM: home readings within goal w/ trial CCG; has reduced carbohydrates and added Jardiance 10 mg at last visit 11/14    - HTN: home blood pressure monitoring with in goal w/change in medication on 11/14 since: Verapamil 40 mg tablet-Take 20 mg (half -tablet) every morning with Verapamil  mg for total of 140 mg daily     - spouse very helpful in appointment today and home monitioring     History of Present Illness       Diabetes:   He presents for follow up of diabetes.  He is checking home blood glucose three times daily.   He checks blood glucose before and after meals.  Blood glucose is never  over 200 and never under 70. He is aware of hypoglycemia symptoms including none.    He has no concerns regarding his diabetes at this time.   He is not experiencing numbness or burning in feet, excessive thirst, blurry vision, weight changes or redness, sores or blisters on feet.           Reason for visit:  Prior authorization for kalpana and  jelly    He eats 2-3 servings of fruits and vegetables daily.He exercises with enough effort to increase his heart rate 20 to 29 minutes per day.  He exercises with enough effort to increase his heart rate 3 or less days per week.   He is taking medications regularly.     Part b coverage for libr            Review of Systems         Objective    BP (!) 159/82 (BP Location: Right arm, Patient Position: Sitting, Cuff Size: Adult Large)   Pulse 97   Temp 97.7  F (36.5  C) (Temporal)   Resp 15   Ht 1.829 m (6')   Wt 92.5 kg (204 lb)   SpO2 100%   BMI 27.67 kg/m    Body mass index is 27.67 kg/m .  Physical Exam

## 2023-11-29 ENCOUNTER — TELEPHONE (OUTPATIENT)
Dept: FAMILY MEDICINE | Facility: CLINIC | Age: 78
End: 2023-11-29

## 2023-11-29 NOTE — TELEPHONE ENCOUNTER
Yoanna, caregiver calling, CTC on file    Seen yesterday in clinic and given a referral to ortho for this but she was unhappy with location.     He wants to get his toenails clipped at [place] .    I did tell Yoanna there is no one here that clips toenails and she will need to go with the location they advised.    Yulissa Nash, RN, BSN  [facility]

## 2023-12-28 ENCOUNTER — TELEPHONE (OUTPATIENT)
Dept: FAMILY MEDICINE | Facility: CLINIC | Age: 78
End: 2023-12-28

## 2023-12-28 NOTE — TELEPHONE ENCOUNTER
Waylon,    Please see notes below and advise.    General Call     Who is Calling: Chris (pt's sister)       Reason for call: Lise CGM     What is/are your concern (s): Received call from pharmacy stating insurance does not cover Lise CGM. Please prescribe alternative of generate PA.          Nilda DENNISON RN  [facility]

## 2024-01-04 DIAGNOSIS — I10 HYPERTENSION GOAL BP (BLOOD PRESSURE) < 140/90: ICD-10-CM

## 2024-01-04 RX ORDER — VERAPAMIL HYDROCHLORIDE 40 MG/1
TABLET ORAL
Qty: 45 TABLET | Refills: 0 | OUTPATIENT
Start: 2024-01-04

## 2024-01-05 NOTE — TELEPHONE ENCOUNTER
2nd attempt spoke with patient on the phone and he said that he was going to make appointment that he will do that   Were Photos Taken?: Yes Suction Settings: The suction settings were per protocol. Device: EMSculpt Efren Time (Minutes - Will Only Render If Nonzero): 0 Location 1: central abdomen Consent: Written consent obtained, risks reviewed including, but not limited to, blistering from suction, darker or lighter pigmentary change, bruising, and/or need for multiple treatments. Rf Intensity (Optional): 100 Time (Minutes - Will Only Render If Nonzero): 30 Post Treatment: After treatment, the suction was turned off, and the applicator was removed from the skin. Detail Level: Detailed Intro: Prior to treatment, the area was cleaned with alcohol and marked out with a marking pen. The gel sheet was then applied uniformly. The applicator was applied to the skin with good contact and suction.

## 2024-01-08 DIAGNOSIS — I10 HYPERTENSION GOAL BP (BLOOD PRESSURE) < 140/90: ICD-10-CM

## 2024-01-08 NOTE — TELEPHONE ENCOUNTER
Patient would like a 90 days supply on this medication, please approve.    Thank You,    Rossy Gibbons, [facility] ] Department   [facility]  [email or web address]

## 2024-01-09 DIAGNOSIS — I10 HYPERTENSION GOAL BP (BLOOD PRESSURE) < 140/90: ICD-10-CM

## 2024-01-09 RX ORDER — VERAPAMIL HYDROCHLORIDE 40 MG/1
TABLET ORAL
Qty: 90 TABLET | Refills: 3 | Status: SHIPPED | OUTPATIENT
Start: 2024-01-09

## 2024-01-09 RX ORDER — VERAPAMIL HYDROCHLORIDE 40 MG/1
TABLET ORAL
Qty: 45 TABLET | Refills: 0 | OUTPATIENT
Start: 2024-01-09

## 2024-01-09 NOTE — TELEPHONE ENCOUNTER
Yoanna calling    Verapamil needs refill    RN was not able to do the refill due to last creatinine    Creatinine   Date Value Ref Range Status   11/14/2023 1.20 (H) 0.67 - 1.17 mg/dL Final   08/18/2020 1.13 0.66 - 1.25 mg/dL Final       Refill request to MD/primary care provider.  Yulissa Nash RN

## 2024-01-10 ENCOUNTER — NURSE TRIAGE (OUTPATIENT)
Dept: NURSING | Facility: CLINIC | Age: 79
End: 2024-01-10

## 2024-01-10 DIAGNOSIS — E11.65 TYPE 2 DIABETES MELLITUS WITH HYPERGLYCEMIA, WITHOUT LONG-TERM CURRENT USE OF INSULIN (H): ICD-10-CM

## 2024-01-10 NOTE — TELEPHONE ENCOUNTER
Yoanna returning a call from the clinic, message was unclear.    Regarding verapamil, informed that rx was sent in.    Anahi DENNISON RN  [facility]

## 2024-01-10 NOTE — TELEPHONE ENCOUNTER
Yoanna returning a call from the clinic, message was unclear.     Regarding verapamil, informed that rx was sent in.     Anaih DENNISON RN  [facility]

## 2024-01-11 NOTE — TELEPHONE ENCOUNTER
Carlita from [employer] calling that pt insurance won't cover Freestyle Lise 2 Sensor through Medicare Plan A which pt has, pt has to have an activated Medicare Plan B.    Pt requesting Carlita from [employer] call clinic and relay message to PCP to send Rx to local Pharmacy for Freestyle Lise 2 Sensor.    Thank you  Santa Del Cid RN  [facility] Nurse Advisor

## 2024-01-11 NOTE — TELEPHONE ENCOUNTER
Waylon-please review and sign if agree.      Thank you!  ROSE MARIE SkinnerN, RN-BC  [facility]

## 2024-01-16 NOTE — TELEPHONE ENCOUNTER
"Woman calling for David asked why someone called twice and didn't leave a voicemail. States, \"you know you're not supposed to call people before 9, right?\"     Reviewed provider message below with David, will continue finger stick BG checks at home for now.    ROSE MARIE NinoN, RN  [facility]    "

## 2024-01-16 NOTE — TELEPHONE ENCOUNTER
Attempted to contact patient several times; receive VM.   Staff check with patient if he has Medicare Part B?  - sent to regular pharmacy previously denied by insurance cost to expensive for patient.'=  - if no part B will continue home finger checks 3 times a day fasting; noon and bedtime.   CARMEN

## 2024-02-10 ENCOUNTER — TRANSFERRED RECORDS (OUTPATIENT)
Dept: MULTI SPECIALTY CLINIC | Facility: CLINIC | Age: 79
End: 2024-02-10

## 2024-02-10 LAB — RETINOPATHY: NORMAL

## 2024-02-19 DIAGNOSIS — E78.5 HYPERLIPIDEMIA WITH TARGET LDL LESS THAN 100: ICD-10-CM

## 2024-02-20 RX ORDER — SIMVASTATIN 10 MG
10 TABLET ORAL AT BEDTIME
Qty: 90 TABLET | Refills: 3 | Status: SHIPPED | OUTPATIENT
Start: 2024-02-20 | End: 2024-04-30

## 2024-02-20 RX ORDER — SIMVASTATIN 20 MG
TABLET ORAL
Qty: 90 TABLET | Refills: 3 | OUTPATIENT
Start: 2024-02-20

## 2024-02-21 NOTE — TELEPHONE ENCOUNTER
Spoke with patient and informed simvastatin dose is reduced due to interaction.  Patient verbalized understanding.    Anahi DENNISON RN  [facility]

## 2024-02-21 NOTE — TELEPHONE ENCOUNTER
Please notify patient simvastatin was reduced to 10 mg daily to reduce interaction with Verapamil current medication.   Can discuss more at upcoming visit on 3/19.  SW

## 2024-02-21 NOTE — TELEPHONE ENCOUNTER
Partner Yoanna calling to clarify the below medication change. CTC on file. Confirmed below change to 10mg simvastatin, Yoanna confirmed understanding. Partner appreciates being involved in medication changes going forward, as she assist the patient in refills and set up of meds at home.    ROSE MARIE NinoN, RN  [facility]

## 2024-04-01 NOTE — RESULT ENCOUNTER NOTE
Diagnoses:    Generalized Anxiety Disorder    Major Depressive Disorder, single episode, moderate  Social Anxiety Disorder    Treatment: Met with Court individually. Session was spent discussing family dynamics and barriers to Court leaving her bedroom. Processed and validated feelings. Discussed setting up a regular family activity that Court has the potential to enjoy and feels lower risk with regard to potential for conflict. Discussed planning a weekly family movie night. Court agreed to share this idea with her mom. Briefly reviewed plan developed last week for Court to bring her service dog to school this week.    Assessment: Court presented as casually dressed and appropriately groomed. She engaged easily and was responsive in session. Eye contact was good. Mood was euthymic. Affect was full range and appropriate to content of speech. Thought processes were logical and goal-directed. Attention and concentration were good. No psychomotor disturbances noted. Speech was normal in rate, rhythm, and prosody. Insight and judgment are developmentally appropriate. No suicidal ideation reported.        Plan: Return on 04/15/2024     Total Time: 55 mins     Start Time: 2:30pm  End Time: 3:25pm     Results discussed directly with patient while patient was present. Any further details documented in the note.   DANIEL Arita CNP

## 2024-04-30 ENCOUNTER — OFFICE VISIT (OUTPATIENT)
Dept: FAMILY MEDICINE | Facility: CLINIC | Age: 79
End: 2024-04-30

## 2024-04-30 VITALS
BODY MASS INDEX: 29.3 KG/M2 | TEMPERATURE: 97.2 F | HEART RATE: 89 BPM | SYSTOLIC BLOOD PRESSURE: 138 MMHG | HEIGHT: 71 IN | OXYGEN SATURATION: 98 % | RESPIRATION RATE: 18 BRPM | WEIGHT: 209.3 LBS | DIASTOLIC BLOOD PRESSURE: 74 MMHG

## 2024-04-30 DIAGNOSIS — E78.5 HYPERLIPIDEMIA WITH TARGET LDL LESS THAN 100: ICD-10-CM

## 2024-04-30 DIAGNOSIS — D63.1 ANEMIA DUE TO STAGE 3A CHRONIC KIDNEY DISEASE (H): ICD-10-CM

## 2024-04-30 DIAGNOSIS — R80.9 TYPE 2 DIABETES MELLITUS WITH DIABETIC MICROALBUMINURIA, WITHOUT LONG-TERM CURRENT USE OF INSULIN (H): ICD-10-CM

## 2024-04-30 DIAGNOSIS — E11.29 TYPE 2 DIABETES MELLITUS WITH DIABETIC MICROALBUMINURIA, WITHOUT LONG-TERM CURRENT USE OF INSULIN (H): ICD-10-CM

## 2024-04-30 DIAGNOSIS — Z00.00 ENCOUNTER FOR MEDICARE ANNUAL WELLNESS EXAM: Primary | ICD-10-CM

## 2024-04-30 DIAGNOSIS — E86.0 DEHYDRATION: ICD-10-CM

## 2024-04-30 DIAGNOSIS — Z23 NEED FOR COVID-19 VACCINE: ICD-10-CM

## 2024-04-30 DIAGNOSIS — I10 HYPERTENSION GOAL BP (BLOOD PRESSURE) < 140/90: ICD-10-CM

## 2024-04-30 DIAGNOSIS — E11.65 TYPE 2 DIABETES MELLITUS WITH HYPERGLYCEMIA, WITHOUT LONG-TERM CURRENT USE OF INSULIN (H): ICD-10-CM

## 2024-04-30 DIAGNOSIS — N18.31 ANEMIA DUE TO STAGE 3A CHRONIC KIDNEY DISEASE (H): ICD-10-CM

## 2024-04-30 LAB
ANION GAP SERPL CALCULATED.3IONS-SCNC: 12 MMOL/L (ref 7–15)
BUN SERPL-MCNC: 17.8 MG/DL (ref 8–23)
CALCIUM SERPL-MCNC: 9.2 MG/DL (ref 8.8–10.2)
CHLORIDE SERPL-SCNC: 103 MMOL/L (ref 98–107)
CHOLEST SERPL-MCNC: 126 MG/DL
CREAT SERPL-MCNC: 1.24 MG/DL (ref 0.67–1.17)
DEPRECATED HCO3 PLAS-SCNC: 23 MMOL/L (ref 22–29)
EGFRCR SERPLBLD CKD-EPI 2021: 60 ML/MIN/1.73M2
ERYTHROCYTE [DISTWIDTH] IN BLOOD BY AUTOMATED COUNT: 13.8 % (ref 10–15)
FASTING STATUS PATIENT QL REPORTED: NO
GLUCOSE SERPL-MCNC: 317 MG/DL (ref 70–99)
HBA1C MFR BLD: 9.7 % (ref 0–5.6)
HCT VFR BLD AUTO: 44.9 % (ref 40–53)
HDLC SERPL-MCNC: 34 MG/DL
HGB BLD-MCNC: 14.7 G/DL (ref 13.3–17.7)
LDLC SERPL CALC-MCNC: 67 MG/DL
MCH RBC QN AUTO: 29.2 PG (ref 26.5–33)
MCHC RBC AUTO-ENTMCNC: 32.7 G/DL (ref 31.5–36.5)
MCV RBC AUTO: 89 FL (ref 78–100)
NONHDLC SERPL-MCNC: 92 MG/DL
PLATELET # BLD AUTO: 326 10E3/UL (ref 150–450)
POTASSIUM SERPL-SCNC: 4.6 MMOL/L (ref 3.4–5.3)
RBC # BLD AUTO: 5.04 10E6/UL (ref 4.4–5.9)
SODIUM SERPL-SCNC: 138 MMOL/L (ref 135–145)
TRIGL SERPL-MCNC: 126 MG/DL
WBC # BLD AUTO: 7 10E3/UL (ref 4–11)

## 2024-04-30 PROCEDURE — 91320 SARSCV2 VAC 30MCG TRS-SUC IM: CPT | Performed by: NURSE PRACTITIONER

## 2024-04-30 PROCEDURE — 90480 ADMN SARSCOV2 VAC 1/ONLY CMP: CPT | Performed by: NURSE PRACTITIONER

## 2024-04-30 PROCEDURE — 80048 BASIC METABOLIC PNL TOTAL CA: CPT | Performed by: NURSE PRACTITIONER

## 2024-04-30 PROCEDURE — 83036 HEMOGLOBIN GLYCOSYLATED A1C: CPT | Performed by: NURSE PRACTITIONER

## 2024-04-30 PROCEDURE — 80061 LIPID PANEL: CPT | Performed by: NURSE PRACTITIONER

## 2024-04-30 PROCEDURE — 36415 COLL VENOUS BLD VENIPUNCTURE: CPT | Performed by: NURSE PRACTITIONER

## 2024-04-30 PROCEDURE — G0439 PPPS, SUBSEQ VISIT: HCPCS | Performed by: NURSE PRACTITIONER

## 2024-04-30 PROCEDURE — 99214 OFFICE O/P EST MOD 30 MIN: CPT | Mod: 25 | Performed by: NURSE PRACTITIONER

## 2024-04-30 PROCEDURE — 85027 COMPLETE CBC AUTOMATED: CPT | Performed by: NURSE PRACTITIONER

## 2024-04-30 RX ORDER — RESPIRATORY SYNCYTIAL VIRUS VACCINE 120MCG/0.5
0.5 KIT INTRAMUSCULAR ONCE
Qty: 1 EACH | Refills: 0 | Status: CANCELLED | OUTPATIENT
Start: 2024-04-30 | End: 2024-04-30

## 2024-04-30 RX ORDER — ROSUVASTATIN CALCIUM 20 MG/1
20 TABLET, COATED ORAL DAILY
Qty: 90 TABLET | Refills: 3 | Status: SHIPPED | OUTPATIENT
Start: 2024-04-30 | End: 2024-07-26

## 2024-04-30 SDOH — HEALTH STABILITY: PHYSICAL HEALTH: ON AVERAGE, HOW MANY MINUTES DO YOU ENGAGE IN EXERCISE AT THIS LEVEL?: 20 MIN

## 2024-04-30 SDOH — HEALTH STABILITY: PHYSICAL HEALTH: ON AVERAGE, HOW MANY DAYS PER WEEK DO YOU ENGAGE IN MODERATE TO STRENUOUS EXERCISE (LIKE A BRISK WALK)?: 6 DAYS

## 2024-04-30 ASSESSMENT — SOCIAL DETERMINANTS OF HEALTH (SDOH): HOW OFTEN DO YOU GET TOGETHER WITH FRIENDS OR RELATIVES?: NEVER

## 2024-04-30 NOTE — PATIENT INSTRUCTIONS
BLOOD PRESSURE:  -continue verapamil 140 mg daily   - continue Coreg 12.5 mg daily  - Monitor home blood pressure 2-3 times per week; notify if above goal less than 140/90    Diabetes:  - continue Metformin 1000 mg twice a day  - continue Jardiance 10 mg daily  - check blood sugars three times a day     Cholesterol:  - complete Simvastatin pills at home then change  - Rosuvastatin 20 mg daily         Preventive Care Advice   This is general advice given by our system to help you stay healthy. However, your care team may have specific advice just for you. Please talk to your care team about your preventive care needs.  Nutrition  Eat 5 or more servings of fruits and vegetables each day.  Try wheat bread, brown rice and whole grain pasta (instead of white bread, rice, and pasta).  Get enough calcium and vitamin D. Check the label on foods and aim for 100% of the RDA (recommended daily allowance).  Lifestyle  Exercise at least 150 minutes each week   (30 minutes a day, 5 days a week).  Do muscle strengthening activities 2 days a week. These help control your weight and prevent disease.  No smoking.  Wear sunscreen to prevent skin cancer.  Have a dental exam and cleaning every 6 months.  Yearly exams  See your health care team every year to talk about:  Any changes in your health.  Any medicines your care team has prescribed.  Preventive care, family planning, and ways to prevent chronic diseases.  Shots (vaccines)   HPV shots (up to age 26), if you've never had them before.  Hepatitis B shots (up to age 59), if you've never had them before.  COVID-19 shot: Get this shot when it's due.  Flu shot: Get a flu shot every year.  Tetanus shot: Get a tetanus shot every 10 years.  Pneumococcal, hepatitis A, and RSV shots: Ask your care team if you need these based on your risk.  Shingles shot (for age 50 and up).  General health tests  Diabetes screening:  Starting at age 35, Get screened for diabetes at least every 3  years.  If you are younger than age 35, ask your care team if you should be screened for diabetes.  Cholesterol test: At age 39, start having a cholesterol test every 5 years, or more often if advised.  Bone density scan (DEXA): At age 50, ask your care team if you should have this scan for osteoporosis (brittle bones).  Hepatitis C: Get tested at least once in your life.  STIs (sexually transmitted infections)  Before age 24: Ask your care team if you should be screened for STIs.  After age 24: Get screened for STIs if you're at risk. You are at risk for STIs (including HIV) if:  You are sexually active with more than one person.  You don't use condoms every time.  You or a partner was diagnosed with a sexually transmitted infection.  If you are at risk for HIV, ask about PrEP medicine to prevent HIV.  Get tested for HIV at least once in your life, whether you are at risk for HIV or not.  Cancer screening tests  Cervical cancer screening: If you have a cervix, begin getting regular cervical cancer screening tests at age 21. Most people who have regular screenings with normal results can stop after age 65. Talk about this with your provider.  Breast cancer scan (mammogram): If you've ever had breasts, begin having regular mammograms starting at age 40. This is a scan to check for breast cancer.  Colon cancer screening: It is important to start screening for colon cancer at age 45.  Have a colonoscopy test every 10 years (or more often if you're at risk) Or, ask your provider about stool tests like a FIT test every year or Cologuard test every 3 years.  To learn more about your testing options, visit: https://www.[employer]/177459.pdf.  For help making a decision, visit: https://bit.ly/jp84340.  Prostate cancer screening test: If you have a prostate and are age 55 to 69, ask your provider if you would benefit from a yearly prostate cancer screening test.  Lung cancer screening: If you are a current or former smoker  age 50 to 80, ask your care team if ongoing lung cancer screenings are right for you.  For informational purposes only. Not to replace the advice of your health care provider. Copyright   2023 [facility]. All rights reserved. Clinically reviewed by the [facility]itions Program. [employer] 596352 - REV 01/24.    Learning About Activities of Daily Living  What are activities of daily living?     Activities of daily living (ADLs) are the basic self-care tasks you do every day. These include eating, bathing, dressing, and moving around.  As you age, and if you have health problems, you may find that it's harder to do some of these tasks. If so, your doctor can suggest ideas that may help.  To measure what kind of help you may need, your doctor will ask how well you are able to do ADLs. Let your doctor know if there are any tasks that you are having trouble doing. This is an important first step to getting help. And when you have the help you need, you can stay as independent as possible.  How will a doctor assess your ADLs?  Asking about ADLs is part of a routine health checkup your doctor will likely do as you age. Your health check might be done in a doctor's office, in your home, or at a hospital. The goal is to find out if you are having any problems that could make it hard to care for yourself or that make it unsafe for you to be on your own.  To measure your ADLs, your doctor will ask how hard it is for you to do routine tasks. Your doctor may also want to know if you have changed the way you do a task because of a health problem. Your doctor may watch how you:  Walk back and forth.  Keep your balance while you stand or walk.  Move from sitting to standing or from a bed to a chair.  Button or unbutton a shirt or sweater.  Remove and put on your shoes.  It's common to feel a little worried or anxious if you find you can't do all the things you used to be able to do. Talking with your  doctor about ADLs is a way to make sure you're as safe as possible and able to care for yourself as well as you can. You may want to bring a caregiver, friend, or family member to your checkup. They can help you talk to your doctor.  Follow-up care is a key part of your treatment and safety. Be sure to make and go to all appointments, and call your doctor if you are having problems. It's also a good idea to know your test results and keep a list of the medicines you take.  Current as of: October 24, 2023               Content Version: 14.0    5307-5086 [employer].   Care instructions adapted under license by your healthcare professional. If you have questions about a medical condition or this instruction, always ask your healthcare professional. [employer] disclaims any warranty or liability for your use of this information.      Relationships for Good Health  Relationships are important for our health and happiness. Social isolation, loneliness and lack of support are bad for your health. Studies show that loneliness can harm health and limit your life span as much as high blood pressure and smoking.   Take some time to reflect on your relationships. Then answer these questions:  Are there people in your life that cause you stress or drain your energy? What can you do to set limits?  ________________________________________________________________________________________________________________________________________________________________________________________________________________________________________________________________________________________________________________________________________________  Who do you enjoy spending time with? Who can you go to for  support?  ________________________________________________________________________________________________________________________________________________________________________________________________________________________________________________________________________________________________________________________________________________  What can you do to improve your relationships with others?  __________________________________________________________________________________________________________________________________________________________________________________________________________________  ______________________________________________________________________________________________________________________________  What do you like most about your relationships with others?  ________________________________________________________________________________________________________________________________________________________________________________________________________________________________________________________________________________________________________________________________________________  My goal: ______________________________________________________________________  I will ______________________________________________________________________________________________________________________________________________________________________________________________    For informational purposes only. Not to replace the advice of your health care provider. Copyright   2018 [facility]. All rights reserved. Clinically reviewed by [facility]  Team. [employer] 418440 - Rev 04/21.    Learning About Alcohol Use Disorder  What is alcohol use disorder?  Alcohol use disorder means that a person drinks alcohol even though it causes harm to themselves or others. It can range from mild to severe. The more symptoms of this disorder you have, the more severe it may be.  People who have it may find it hard to control their use of alcohol.  People who have this disorder may argue with others about how much they're drinking. Their job may be affected because of drinking. They may drink when it's dangerous or illegal, such as when they drive. They also may have a strong need, or craving, to drink. They may feel like they must drink just to get by. Their drinking may increase their risk of getting hurt or being in a car crash.  Over time, drinking too much alcohol may cause health problems. These may include high blood pressure, liver problems, or problems with digestion.  What are the symptoms?  Maybe you've wondered about your alcohol habits or how to tell if your drinking is becoming a problem.  Here are some of the symptoms of alcohol use disorder. You may have it if you have two or more of the following symptoms:  You drink larger amounts of alcohol than you ever meant to. Or you've been drinking for a longer time than you ever meant to.  You can't cut down or control your use. Or you constantly wish you could cut down.  You spend a lot of time getting or drinking alcohol or recovering from its effects.  You have strong cravings for alcohol.  You can no longer do your main jobs at work, at school, or at home.  You keep drinking alcohol, even though your use hurts your relationships.  You have stopped doing important activities because of your alcohol use.  You drink alcohol in situations where doing so is dangerous.  You keep drinking alcohol even though you know it's causing health problems.  You need more and more alcohol to get the same effect, or you get less effect from the same amount over time. This is called tolerance.  You have uncomfortable symptoms when you stop drinking alcohol or use less. This is called withdrawal.  Alcohol use disorder can range from mild to severe. The more symptoms you have, the more severe the disorder may be.  You might not realize that your  "drinking is a problem. You might not drink large amounts when you drink. Or you might go for days or weeks between drinking episodes. But even if you don't drink very often, your drinking could still be harmful and put you at risk.  How is alcohol use disorder treated?  Getting help is up to you. But you don't have to do it alone. There are many people and kinds of treatments that can help.  Treatment for alcohol use disorder can include:  Group therapy, one or more types of counseling, and alcohol education.  Medicines that help to:  Reduce withdrawal symptoms and help you safely stop drinking.  Reduce cravings for alcohol.  Support groups. These groups include Alcoholics Anonymous and VOSS (Self-Management and Recovery Training).  Some people are able to stop or cut back on drinking with help from a counselor. People who have moderate to severe alcohol use disorder may need medical treatment. They may need to stay in a hospital or treatment center.  You may have a treatment team to help you. This team may include a psychologist or psychiatrist, counselors, doctors, social workers, nurses, and a . A  helps plan and manage your treatment.  Follow-up care is a key part of your treatment and safety. Be sure to make and go to all appointments, and call your doctor if you are having problems. It's also a good idea to know your test results and keep a list of the medicines you take.  Where can you learn more?  Go to https://www.[employer].net/patiented  Enter H758 in the search box to learn more about \"Learning About Alcohol Use Disorder.\"  Current as of: November 15, 2023               Content Version: 14.0    7687-7698 [employer].   Care instructions adapted under license by your healthcare professional. If you have questions about a medical condition or this instruction, always ask your healthcare professional. [employer] disclaims any warranty or liability " for your use of this information.      Substance Use Disorder: Care Instructions  Overview     You can improve your life and health by stopping your use of alcohol or drugs. When you don't drink or use drugs, you may feel and sleep better. You may get along better with your family, friends, and coworkers. There are medicines and programs that can help with substance use disorder.  How can you care for yourself at home?  Here are some ways to help you stay sober and prevent relapse.  If you have been given medicine to help keep you sober or reduce your cravings, be sure to take it exactly as prescribed.  Talk to your doctor about programs that can help you stop using drugs or drinking alcohol.  Do not keep alcohol or drugs in your home.  Plan ahead. Think about what you'll say if other people ask you to drink or use drugs. Try not to spend time with people who drink or use drugs.  Use the time and money spent on drinking or drugs to do something that's important to you.  Preventing a relapse  Have a plan to deal with relapse. Learn to recognize changes in your thinking that lead you to drink or use drugs. Get help before you start to drink or use drugs again.  Try to stay away from situations, friends, or places that may lead you to drink or use drugs.  If you feel the need to drink alcohol or use drugs again, seek help right away. Call a trusted friend or family member. Some people get support from organizations such as Narcotics Anonymous or [facility] or from treatment facilities.  If you relapse, get help as soon as you can. Some people make a plan with another person that outlines what they want that person to do for them if they relapse. The plan usually includes how to handle the relapse and who to notify in case of relapse.  Don't give up. Remember that a relapse doesn't mean that you have failed. Use the experience to learn the triggers that lead you to drink or use drugs. Then quit again. Recovery is a  "lifelong process. Many people have several relapses before they are able to quit for good.  Follow-up care is a key part of your treatment and safety. Be sure to make and go to all appointments, and call your doctor if you are having problems. It's also a good idea to know your test results and keep a list of the medicines you take.  When should you call for help?   Call 911  anytime you think you may need emergency care. For example, call if you or someone else:    Has overdosed or has withdrawal signs. Be sure to tell the emergency workers that you are or someone else is using or trying to quit using drugs. Overdose or withdrawal signs may include:  Losing consciousness.  Seizure.  Seeing or hearing things that aren't there (hallucinations).     Is thinking or talking about suicide or harming others.   Where to get help 24 hours a day, 7 days a week   If you or someone you know talks about suicide, self-harm, a mental health crisis, a substance use crisis, or any other kind of emotional distress, get help right away. You can:    Call the Suicide and Crisis Lifeline at 988.     Call 5-211-447-LNBT (1-175.352.9063).     Text HOME to 317871 to access the Crisis Text Line.   Consider saving these numbers in your phone.  Go to [employer] for more information or to chat online.  Call your doctor now or seek immediate medical care if:    You are having withdrawal symptoms. These may include nausea or vomiting, sweating, shakiness, and anxiety.   Watch closely for changes in your health, and be sure to contact your doctor if:    You have a relapse.     You need more help or support to stop.   Where can you learn more?  Go to https://www.healthwise.net/patiented  Enter H573 in the search box to learn more about \"Substance Use Disorder: Care Instructions.\"  Current as of: November 15, 2023               Content Version: 14.0    6592-4424 [employer].   Care instructions adapted under license by your " healthcare professional. If you have questions about a medical condition or this instruction, always ask your healthcare professional. [employer] disclaims any warranty or liability for your use of this information.

## 2024-04-30 NOTE — PROGRESS NOTES
Preventive Care Visit  [facility]  DANIEL Arita CNP, Family Medicine  Apr 30, 2024      Assessment & Plan     Encounter for Medicare annual wellness exam  Preventative exam w/no abnormalities and/or concerns listed in diagnoses; discussed health maintenance screenings including prostate, breast, cervical and colorectal ca screenings related to gender;  reviewed and reconciled medication, medical history and patient related health concerns  Plan: obtain metabolic labs      Type 2 diabetes mellitus with diabetic microalbuminuria, without long-term current use of insulin (H)  Hemoglobin A1C   Date Value Ref Range Status   04/30/2024 9.7 (H) 0.0 - 5.6 % Final   08/18/2020 9.0 (H) 0 - 5.6 % Final     Comment:     Normal <5.7% Prediabetes 5.7-6.4%  Diabetes 6.5% or higher - adopted from ADA   consensus guidelines.     Trending upward; current regimen jardiance 10 mg daily; metformin 2000 mg daily; will increase Jardiance 10 mg to 25 mg; trial glp-1 vs insulin concern hypoglycemia and proper dosing; declined  services  - HEMOGLOBIN A1C  - Basic metabolic panel  - HEMOGLOBIN A1C  - Basic metabolic panel  - PRIMARY CARE FOLLOW-UP SCHEDULING    Anemia due to stage 3a chronic kidney disease (H)  Stable; continue current regimen; H/H 14/44; Scr 1.04-1.12; continue to monitr  - CBC with platelets  - CBC with platelets    Hyperlipidemia with target LDL less than 100  LDL 72; HDL 36; on simvastatin 10 mg reduce 2/2 contradiction w/verapamil use; discussed change to rosuvastatin ; continue to monitor; check lipids   - Basic metabolic panel  - Lipid Profile  - rosuvastatin (CRESTOR) 20 MG tablet  Dispense: 90 tablet; Refill: 3      Need for COVID-19 vaccine  - COVID-19 12+ (2023-24) (PFIZER)    Hypertension goal BP (blood pressure) < 140/90  Optimal management; Monitor home blood pressure 2-3 times per week; notify if above goal <130/80; dietary and exercise changes to improve blood pressure;  renewed current regimen; check BMP quarterly; urine albumin yrly   - Basic metabolic panel  - Basic metabolic panel    Patient Instructions   BLOOD PRESSURE:  -continue verapamil 140 mg daily   - continue Coreg 12.5 mg daily  - Monitor home blood pressure 2-3 times per week; notify if above goal less than 140/90    Diabetes:  - continue Metformin 1000 mg twice a day  - continue Jardiance 10 mg daily  - check blood sugars three times a day     Cholesterol:  - complete Simvastatin pills at home then change  - Rosuvastatin 20 mg daily     Counseling  Appropriate preventive services were discussed with this patient, including applicable screening as appropriate for fall prevention, nutrition, physical activity, Tobacco-use cessation, weight loss and cognition.  Checklist reviewing preventive services available has been given to the patient.  Reviewed patient's diet, addressing concerns and/or questions.   Patient is at risk for social isolation and has been provided with information about the benefit of social connection.   The patient reports drinking more than one alcoholic drink per day and sometimes engages in binge or excessive drinking. The patient was counseled and given information about possible harmful effects of excessive alcohol intake as well as where to get help for alcohol problems. Patient reported safety concerns were addressed today.            Preventive Care Advice   This is general advice given by our system to help you stay healthy. However, your care team may have specific advice just for you. Please talk to your care team about your preventive care needs.  Nutrition  Eat 5 or more servings of fruits and vegetables each day.  Try wheat bread, brown rice and whole grain pasta (instead of white bread, rice, and pasta).  Get enough calcium and vitamin D. Check the label on foods and aim for 100% of the RDA (recommended daily allowance).  Lifestyle  Exercise at least 150 minutes each week   (30  minutes a day, 5 days a week).  Do muscle strengthening activities 2 days a week. These help control your weight and prevent disease.  No smoking.  Wear sunscreen to prevent skin cancer.  Have a dental exam and cleaning every 6 months.  Yearly exams  See your health care team every year to talk about:  Any changes in your health.  Any medicines your care team has prescribed.  Preventive care, family planning, and ways to prevent chronic diseases.  Shots (vaccines)   HPV shots (up to age 26), if you've never had them before.  Hepatitis B shots (up to age 59), if you've never had them before.  COVID-19 shot: Get this shot when it's due.  Flu shot: Get a flu shot every year.  Tetanus shot: Get a tetanus shot every 10 years.  Pneumococcal, hepatitis A, and RSV shots: Ask your care team if you need these based on your risk.  Shingles shot (for age 50 and up).  General health tests  Diabetes screening:  Starting at age 35, Get screened for diabetes at least every 3 years.  If you are younger than age 35, ask your care team if you should be screened for diabetes.  Cholesterol test: At age 39, start having a cholesterol test every 5 years, or more often if advised.  Bone density scan (DEXA): At age 50, ask your care team if you should have this scan for osteoporosis (brittle bones).  Hepatitis C: Get tested at least once in your life.  STIs (sexually transmitted infections)  Before age 24: Ask your care team if you should be screened for STIs.  After age 24: Get screened for STIs if you're at risk. You are at risk for STIs (including HIV) if:  You are sexually active with more than one person.  You don't use condoms every time.  You or a partner was diagnosed with a sexually transmitted infection.  If you are at risk for HIV, ask about PrEP medicine to prevent HIV.  Get tested for HIV at least once in your life, whether you are at risk for HIV or not.  Cancer screening tests  Cervical cancer screening: If you have a cervix,  begin getting regular cervical cancer screening tests at age 21. Most people who have regular screenings with normal results can stop after age 65. Talk about this with your provider.  Breast cancer scan (mammogram): If you've ever had breasts, begin having regular mammograms starting at age 40. This is a scan to check for breast cancer.  Colon cancer screening: It is important to start screening for colon cancer at age 45.  Have a colonoscopy test every 10 years (or more often if you're at risk) Or, ask your provider about stool tests like a FIT test every year or Cologuard test every 3 years.  To learn more about your testing options, visit: [email or web address].pdf.  For help making a decision, visit: https://bit.Tourvia.me/yk19210.  Prostate cancer screening test: If you have a prostate and are age 55 to 69, ask your provider if you would benefit from a yearly prostate cancer screening test.  Lung cancer screening: If you are a current or former smoker age 50 to 80, ask your care team if ongoing lung cancer screenings are right for you.  For informational purposes only. Not to replace the advice of your health care provider. Copyright   2023 [facility]. All rights reserved. Clinically reviewed by the [facility] Transitions Program. [employer] [id number] - REV 01/24.    Learning About Activities of Daily Living  What are activities of daily living?     Activities of daily living (ADLs) are the basic self-care tasks you do every day. These include eating, bathing, dressing, and moving around.  As you age, and if you have health problems, you may find that it's harder to do some of these tasks. If so, your doctor can suggest ideas that may help.  To measure what kind of help you may need, your doctor will ask how well you are able to do ADLs. Let your doctor know if there are any tasks that you are having trouble doing. This is an important first step to getting help. And when you have the help  you need, you can stay as independent as possible.  How will a doctor assess your ADLs?  Asking about ADLs is part of a routine health checkup your doctor will likely do as you age. Your health check might be done in a doctor's office, in your home, or at a hospital. The goal is to find out if you are having any problems that could make it hard to care for yourself or that make it unsafe for you to be on your own.  To measure your ADLs, your doctor will ask how hard it is for you to do routine tasks. Your doctor may also want to know if you have changed the way you do a task because of a health problem. Your doctor may watch how you:  Walk back and forth.  Keep your balance while you stand or walk.  Move from sitting to standing or from a bed to a chair.  Button or unbutton a shirt or sweater.  Remove and put on your shoes.  It's common to feel a little worried or anxious if you find you can't do all the things you used to be able to do. Talking with your doctor about ADLs is a way to make sure you're as safe as possible and able to care for yourself as well as you can. You may want to bring a caregiver, friend, or family member to your checkup. They can help you talk to your doctor.  Follow-up care is a key part of your treatment and safety. Be sure to make and go to all appointments, and call your doctor if you are having problems. It's also a good idea to know your test results and keep a list of the medicines you take.  Current as of: October 24, 2023               Content Version: 14.0    5153-9705 [employer].   Care instructions adapted under license by your healthcare professional. If you have questions about a medical condition or this instruction, always ask your healthcare professional. [employer] disclaims any warranty or liability for your use of this information.      Relationships for Good Health  Relationships are important for our health and happiness. Social isolation,  loneliness and lack of support are bad for your health. Studies show that loneliness can harm health and limit your life span as much as high blood pressure and smoking.   Take some time to reflect on your relationships. Then answer these questions:  Are there people in your life that cause you stress or drain your energy? What can you do to set limits?  ________________________________________________________________________________________________________________________________________________________________________________________________________________________________________________________________________________________________________________________________________________  Who do you enjoy spending time with? Who can you go to for support?  ________________________________________________________________________________________________________________________________________________________________________________________________________________________________________________________________________________________________________________________________________________  What can you do to improve your relationships with others?  __________________________________________________________________________________________________________________________________________________________________________________________________________________  ______________________________________________________________________________________________________________________________  What do you like most about your relationships with others?  ________________________________________________________________________________________________________________________________________________________________________________________________________________________________________________________________________________________________________________________________________________  My goal:  ______________________________________________________________________  I will ______________________________________________________________________________________________________________________________________________________________________________________________    For informational purposes only. Not to replace the advice of your health care provider. Copyright   2018 [facility]. All rights reserved. Clinically reviewed by [facility]  Team. [employer] [id number] - Rev 04/21.    Learning About Alcohol Use Disorder  What is alcohol use disorder?  Alcohol use disorder means that a person drinks alcohol even though it causes harm to themselves or others. It can range from mild to severe. The more symptoms of this disorder you have, the more severe it may be. People who have it may find it hard to control their use of alcohol.  People who have this disorder may argue with others about how much they're drinking. Their job may be affected because of drinking. They may drink when it's dangerous or illegal, such as when they drive. They also may have a strong need, or craving, to drink. They may feel like they must drink just to get by. Their drinking may increase their risk of getting hurt or being in a car crash.  Over time, drinking too much alcohol may cause health problems. These may include high blood pressure, liver problems, or problems with digestion.  What are the symptoms?  Maybe you've wondered about your alcohol habits or how to tell if your drinking is becoming a problem.  Here are some of the symptoms of alcohol use disorder. You may have it if you have two or more of the following symptoms:  You drink larger amounts of alcohol than you ever meant to. Or you've been drinking for a longer time than you ever meant to.  You can't cut down or control your use. Or you constantly wish you could cut down.  You spend a lot of time getting or drinking alcohol or recovering from its  effects.  You have strong cravings for alcohol.  You can no longer do your main jobs at work, at school, or at home.  You keep drinking alcohol, even though your use hurts your relationships.  You have stopped doing important activities because of your alcohol use.  You drink alcohol in situations where doing so is dangerous.  You keep drinking alcohol even though you know it's causing health problems.  You need more and more alcohol to get the same effect, or you get less effect from the same amount over time. This is called tolerance.  You have uncomfortable symptoms when you stop drinking alcohol or use less. This is called withdrawal.  Alcohol use disorder can range from mild to severe. The more symptoms you have, the more severe the disorder may be.  You might not realize that your drinking is a problem. You might not drink large amounts when you drink. Or you might go for days or weeks between drinking episodes. But even if you don't drink very often, your drinking could still be harmful and put you at risk.  How is alcohol use disorder treated?  Getting help is up to you. But you don't have to do it alone. There are many people and kinds of treatments that can help.  Treatment for alcohol use disorder can include:  Group therapy, one or more types of counseling, and alcohol education.  Medicines that help to:  Reduce withdrawal symptoms and help you safely stop drinking.  Reduce cravings for alcohol.  Support groups. These groups include Alcoholics Anonymous and [employer] (Self-Management and Recovery Training).  Some people are able to stop or cut back on drinking with help from a counselor. People who have moderate to severe alcohol use disorder may need medical treatment. They may need to stay in a hospital or treatment center.  You may have a treatment team to help you. This team may include a psychologist or psychiatrist, counselors, doctors, social workers, nurses, and a . A case  "manager helps plan and manage your treatment.  Follow-up care is a key part of your treatment and safety. Be sure to make and go to all appointments, and call your doctor if you are having problems. It's also a good idea to know your test results and keep a list of the medicines you take.  Where can you learn more?  Go to https://www.Red Tricycle.net/patiented  Enter H758 in the search box to learn more about \"Learning About Alcohol Use Disorder.\"  Current as of: November 15, 2023               Content Version: 14.0    0058-4621 [employer].   Care instructions adapted under license by your healthcare professional. If you have questions about a medical condition or this instruction, always ask your healthcare professional. [employer] disclaims any warranty or liability for your use of this information.      Substance Use Disorder: Care Instructions  Overview     You can improve your life and health by stopping your use of alcohol or drugs. When you don't drink or use drugs, you may feel and sleep better. You may get along better with your family, friends, and coworkers. There are medicines and programs that can help with substance use disorder.  How can you care for yourself at home?  Here are some ways to help you stay sober and prevent relapse.  If you have been given medicine to help keep you sober or reduce your cravings, be sure to take it exactly as prescribed.  Talk to your doctor about programs that can help you stop using drugs or drinking alcohol.  Do not keep alcohol or drugs in your home.  Plan ahead. Think about what you'll say if other people ask you to drink or use drugs. Try not to spend time with people who drink or use drugs.  Use the time and money spent on drinking or drugs to do something that's important to you.  Preventing a relapse  Have a plan to deal with relapse. Learn to recognize changes in your thinking that lead you to drink or use drugs. Get help before you " start to drink or use drugs again.  Try to stay away from situations, friends, or places that may lead you to drink or use drugs.  If you feel the need to drink alcohol or use drugs again, seek help right away. Call a trusted friend or family member. Some people get support from organizations such as Narcotics Anonymous or abeo or from treatment facilities.  If you relapse, get help as soon as you can. Some people make a plan with another person that outlines what they want that person to do for them if they relapse. The plan usually includes how to handle the relapse and who to notify in case of relapse.  Don't give up. Remember that a relapse doesn't mean that you have failed. Use the experience to learn the triggers that lead you to drink or use drugs. Then quit again. Recovery is a lifelong process. Many people have several relapses before they are able to quit for good.  Follow-up care is a key part of your treatment and safety. Be sure to make and go to all appointments, and call your doctor if you are having problems. It's also a good idea to know your test results and keep a list of the medicines you take.  When should you call for help?   Call 911  anytime you think you may need emergency care. For example, call if you or someone else:    Has overdosed or has withdrawal signs. Be sure to tell the emergency workers that you are or someone else is using or trying to quit using drugs. Overdose or withdrawal signs may include:  Losing consciousness.  Seizure.  Seeing or hearing things that aren't there (hallucinations).     Is thinking or talking about suicide or harming others.   Where to get help 24 hours a day, 7 days a week   If you or someone you know talks about suicide, self-harm, a mental health crisis, a substance use crisis, or any other kind of emotional distress, get help right away. You can:    Call the Suicide and Crisis Lifeline at 859.     Call 3-749-330-TALK (1-411.226.1543).     Text  "HOME to 507764 to access the Crisis Text Line.   Consider saving these numbers in your phone.  Go to [employer] for more information or to chat online.  Call your doctor now or seek immediate medical care if:    You are having withdrawal symptoms. These may include nausea or vomiting, sweating, shakiness, and anxiety.   Watch closely for changes in your health, and be sure to contact your doctor if:    You have a relapse.     You need more help or support to stop.   Where can you learn more?  Go to https://www.[employer]es.net/patiented  Enter H573 in the search box to learn more about \"Substance Use Disorder: Care Instructions.\"  Current as of: November 15, 2023               Content Version: 14.0    7013-2402 [employer].   Care instructions adapted under license by your healthcare professional. If you have questions about a medical condition or this instruction, always ask your healthcare professional. [employer] disclaims any warranty or liability for your use of this information.         Genia Hernandez is a 78 year old, presenting for the following:  Annual Visit        4/30/2024    10:00 AM   Additional Questions   Roomed by RAHEL CHEEMA   Accompanied by Yoanna       Health Care Directive  Patient does not have a Health Care Directive or Living Will: Discussed advance care planning with patient; however, patient declined at this time.    HPI  78 year old year old male  with PMH   Patient Active Problem List   Diagnosis    Hyperlipidemia with target LDL less than 100    Type 2 diabetes, HbA1c goal < 7% (H)    Hypertension goal BP (blood pressure) < 140/90    Advanced directives, counseling/discussion    Type 2 diabetes mellitus with hyperglycemia (H)    Chronic gingivitis    Chronic kidney disease, stage 2 (mild)    Anemia due to stage 3a chronic kidney disease (H)    in clinic for preventive health care exam.     In addition to the preventive visit, 35  minutes of the appointment " were spent evaluating and developing a treatment plan for his additional concern(s).                  4/30/2024   General Health   How would you rate your overall physical health? (!) FAIR   Feel stress (tense, anxious, or unable to sleep) Not at all         4/30/2024   Nutrition   Diet: Regular (no restrictions)         4/30/2024   Exercise   Days per week of moderate/strenous exercise 6 days   Average minutes spent exercising at this level 20 min         4/30/2024   Social Factors   Frequency of gathering with friends or relatives Never   Worry food won't last until get money to buy more No   Food not last or not have enough money for food? No   Do you have housing?  Yes   Are you worried about losing your housing? No   Lack of transportation? No   Unable to get utilities (heat,electricity)? No   (!) SOCIAL CONNECTIONS CONCERN      4/30/2024   Fall Risk   Fallen 2 or more times in the past year? No   Trouble with walking or balance? No          4/30/2024   Activities of Daily Living- Home Safety   Needs help with the following daily activites None of the above   Safety concerns in the home No grab bars in the bathroom         4/30/2024   Dental   Dentist two times every year? Yes         4/30/2024   Hearing Screening   Hearing concerns? None of the above         4/30/2024   Driving Risk Screening   Patient/family members have concerns about driving No         4/30/2024   General Alertness/Fatigue Screening   Have you been more tired than usual lately? No         4/30/2024   Urinary Incontinence Screening   Bothered by leaking urine in past 6 months No         4/30/2024   TB Screening   Were you born outside of the [place]? No         Today's PHQ-2 Score:       4/30/2024    10:14 AM   PHQ-2 ( 1999 [employer])   Q1: Little interest or pleasure in doing things 0   Q2: Feeling down, depressed or hopeless 0   PHQ-2 Score 0   Q1: Little interest or pleasure in doing things Not at all   Q2: Feeling down, depressed or hopeless Not  at all   PHQ-2 Score 0           2024   Substance Use   Alcohol more than 3/day or more than 7/wk Yes   How often do you have a drink containing alcohol 4 or more times a week   How many alcohol drinks on typical day 1 or 2   How often do you have 5+ drinks at one occasion Never   Audit 2/3 Score 0   How often not able to stop drinking once started Never   How often failed to do what normally expected Never   How often needed first drink in am after a heavy drinking session Never   How often feeling of guilt or remorse after drinking Never   How often unable to remember what happened the night before Never   Have you or someone else been injured because of your drinking No   Has anyone been concerned or suggested you cut down on drinking No   TOTAL SCORE - AUDIT 4   Do you have a current opioid prescription? No   How severe/bad is pain from 1 to 10? 0/10 (No Pain)   Do you use any other substances recreationally? (!) ALCOHOL     Social History     Tobacco Use    Smoking status: Former     Current packs/day: 0.00     Types: Cigarettes     Quit date: 1992     Years since quittin.3    Smokeless tobacco: Never    Tobacco comments:     social   Vaping Use    Vaping status: Never Used   Substance Use Topics    Alcohol use: Yes     Comment: rare    Drug use: No       ASCVD Risk   The ASCVD Risk score (Stephanie DK, et al., 2019) failed to calculate for the following reasons:    The valid total cholesterol range is 130 to 320 mg/dL            Reviewed and updated as needed this visit by Provider   Tobacco  Allergies  Meds  Problems  Med Hx  Surg Hx  Fam Hx            Labs reviewed in EPIC  BP Readings from Last 3 Encounters:   24 138/74   23 135/82   23 130/72    Wt Readings from Last 3 Encounters:   24 94.9 kg (209 lb 4.8 oz)   23 92.5 kg (204 lb)   23 93.9 kg (207 lb)                  Current providers sharing in care for this patient include:  Patient Care  "Team:  Waylon Arizmendi APRN CNP as PCP - General (Family Medicine)  Waylon Arizmendi APRN CNP as Assigned PCP    The following health maintenance items are reviewed in Epic and correct as of today:  Health Maintenance   Topic Date Due    RSV VACCINE (Pregnancy & 60+) (1 - 1-dose 60+ series) Never done    ZOSTER IMMUNIZATION (2 of 3) 09/24/2009    DIABETIC FOOT EXAM  03/31/2018    EYE EXAM  01/01/2024    MICROALBUMIN  07/14/2024    A1C  07/30/2024    ANNUAL REVIEW OF HM ORDERS  11/14/2024    MEDICARE ANNUAL WELLNESS VISIT  04/30/2025    BMP  04/30/2025    LIPID  04/30/2025    FALL RISK ASSESSMENT  04/30/2025    HEMOGLOBIN  04/30/2025    DTAP/TDAP/TD IMMUNIZATION (3 - Td or Tdap) 02/05/2028    ADVANCE CARE PLANNING  04/30/2029    HEPATITIS C SCREENING  Completed    PHQ-2 (once per calendar year)  Completed    INFLUENZA VACCINE  Completed    Pneumococcal Vaccine: 65+ Years  Completed    URINALYSIS  Completed    COVID-19 Vaccine  Completed    IPV IMMUNIZATION  Aged Out    HPV IMMUNIZATION  Aged Out    MENINGITIS IMMUNIZATION  Aged Out    RSV MONOCLONAL ANTIBODY  Aged Out    COLORECTAL CANCER SCREENING  Discontinued    LUNG CANCER SCREENING  Discontinued         Review of Systems  Constitutional, HEENT, cardiovascular, pulmonary, gi and gu systems are negative, except as otherwise noted.     Objective    Exam  /74 (BP Location: Right arm, Patient Position: Sitting, Cuff Size: Adult Large)   Pulse 89   Temp 97.2  F (36.2  C) (Temporal)   Resp 18   Ht 1.811 m (5' 11.3\")   Wt 94.9 kg (209 lb 4.8 oz)   SpO2 98%   BMI 28.95 kg/m     Estimated body mass index is 28.95 kg/m  as calculated from the following:    Height as of this encounter: 1.811 m (5' 11.3\").    Weight as of this encounter: 94.9 kg (209 lb 4.8 oz).    Physical Exam  GENERAL: alert and no distress  EYES: Eyes grossly normal to inspection, PERRL and conjunctivae and sclerae normal  HENT: ear canals and TM's normal, nose and mouth without ulcers or " lesions  NECK: no adenopathy, no asymmetry, masses, or scars  RESP: lungs clear to auscultation - no rales, rhonchi or wheezes  CV: regular rate and rhythm, normal S1 S2, no S3 or S4, no murmur, click or rub, no peripheral edema  ABDOMEN: soft, nontender, no hepatosplenomegaly, no masses and bowel sounds normal  MS: no gross musculoskeletal defects noted, no edema  SKIN: no suspicious lesions or rashes  NEURO: Normal strength and tone, mentation intact and speech normal  PSYCH: mentation appears normal, affect normal/bright        4/30/2024   Mini Cog   Clock Draw Score 0 Abnormal   3 Item Recall 0 objects recalled   Mini Cog Total Score 0              Signed Electronically by: DANIEL Arita CNP

## 2024-04-30 NOTE — LETTER
May 1, 2024      David GERMAN Matt Claros.  [address]  [place] [address]        Dear ,    We are writing to inform you of your test results.    Test results indicate you may require additional follow up, see comment below.    Diabetes: uncontrolled diabetes  recommend increasing Jardiance to 25 mg daily; I have sent a new prescription to your pharmacy.   Start weekly injections of Ozempic 0.25 mg     Kidney:  Your kidney levels is slightly elevated. This may be related to dehydration. Recommend increasing your fluid intake 8 oz of water 4-6 times a day.   I would like to recheck your kidney function is 2 weeks.   Please schedule a lab only appointment via [facility] or call [phone] for assistance.     Resulted Orders   HEMOGLOBIN A1C   Result Value Ref Range    Hemoglobin A1C 9.7 (H) 0.0 - 5.6 %   Basic metabolic panel   Result Value Ref Range    Sodium 138 135 - 145 mmol/L      Comment:      Reference intervals for this test were updated on 09/26/2023 to more accurately reflect our healthy population. There may be differences in the flagging of prior results with similar values performed with this method. Interpretation of those prior results can be made in the context of the updated reference intervals.     Potassium 4.6 3.4 - 5.3 mmol/L    Chloride 103 98 - 107 mmol/L    Carbon Dioxide (CO2) 23 22 - 29 mmol/L    Anion Gap 12 7 - 15 mmol/L    Urea Nitrogen 17.8 8.0 - 23.0 mg/dL    Creatinine 1.24 (H) 0.67 - 1.17 mg/dL    GFR Estimate 60 (L) >60 mL/min/1.73m2    Calcium 9.2 8.8 - 10.2 mg/dL    Glucose 317 (H) 70 - 99 mg/dL   Lipid Profile   Result Value Ref Range    Cholesterol 126 <200 mg/dL    Triglycerides 126 <150 mg/dL    Direct Measure HDL 34 (L) >=40 mg/dL    LDL Cholesterol Calculated 67 <=100 mg/dL    Non HDL Cholesterol 92 <130 mg/dL    Patient Fasting > 8hrs? No     Narrative    Cholesterol  Desirable:  <200 mg/dL    Triglycerides  Normal:  Less than 150 mg/dL  Borderline High:   150-199 mg/dL  High:  200-499 mg/dL  Very High:  Greater than or equal to 500 mg/dL    Direct Measure HDL  Female:  Greater than or equal to 50 mg/dL   Male:  Greater than or equal to 40 mg/dL    LDL Cholesterol  Desirable:  <100mg/dL  Above Desirable:  100-129 mg/dL   Borderline High:  130-159 mg/dL   High:  160-189 mg/dL   Very High:  >= 190 mg/dL    Non HDL Cholesterol  Desirable:  130 mg/dL  Above Desirable:  130-159 mg/dL  Borderline High:  160-189 mg/dL  High:  190-219 mg/dL  Very High:  Greater than or equal to 220 mg/dL   CBC with platelets   Result Value Ref Range    WBC Count 7.0 4.0 - 11.0 10e3/uL    RBC Count 5.04 4.40 - 5.90 10e6/uL    Hemoglobin 14.7 13.3 - 17.7 g/dL    Hematocrit 44.9 40.0 - 53.0 %    MCV 89 78 - 100 fL    MCH 29.2 26.5 - 33.0 pg    MCHC 32.7 31.5 - 36.5 g/dL    RDW 13.8 10.0 - 15.0 %    Platelet Count 326 150 - 450 10e3/uL       If you have any questions or concerns, please call the clinic at the number listed above.       Sincerely,      DANIEL Arita CNP

## 2024-05-02 ENCOUNTER — TELEPHONE (OUTPATIENT)
Dept: FAMILY MEDICINE | Facility: CLINIC | Age: 79
End: 2024-05-02

## 2024-05-02 NOTE — TELEPHONE ENCOUNTER
----- Message from DANIEL Arita CNP sent at 5/1/2024 11:23 PM CDT -----  Team,    Letter sent to patient. Please attempt to contact as well:    Diabetes: uncontrolled diabetes  recommend increasing Jardiance to 25 mg daily; I have sent a new prescription to your pharmacy.   Start weekly injections of Ozempic 0.25 mg     Kidney:  Your kidney levels is slightly elevated. This may be related to dehydration. Recommend increasing your fluid intake 8 oz of water 4-6 times a day.   I would like to recheck your kidney function is 2 weeks.     TC: bindu schedule lab only visit in 2 weeks    DANIEL Arita CNP

## 2024-05-02 NOTE — RESULT ENCOUNTER NOTE
Team,    Letter sent to patient. Please attempt to contact as well:    Diabetes: uncontrolled diabetes  recommend increasing Jardiance to 25 mg daily; I have sent a new prescription to your pharmacy.   Start weekly injections of Ozempic 0.25 mg     Kidney:  Your kidney levels is slightly elevated. This may be related to dehydration. Recommend increasing your fluid intake 8 oz of water 4-6 times a day.   I would like to recheck your kidney function is 2 weeks.     TC: pls schedule lab only visit in 2 weeks    DANIEL Arita CNP

## 2024-05-02 NOTE — TELEPHONE ENCOUNTER
Called was called but not home. Spoke to Yoanna ARAUJO ([facility] on file) regarding a return lab level appt.  Yoanna or pt will call back tomorrow when both are home together to schedule a lab only appt.    If patient and or Yoanna calls back, please assist with a lab only appt in two weeks.    Thank you    Kidney:  Your kidney levels is slightly elevated. This may be related to dehydration. Recommend increasing your fluid intake 8 oz of water 4-6 times a day.   I would like to recheck your kidney function is 2 weeks.        Mt Doyle RN  [facility]

## 2024-05-02 NOTE — TELEPHONE ENCOUNTER
----- Message from DANIEL Arita CNP sent at 5/1/2024 11:23 PM CDT -----  Team,    Letter sent to patient. Please attempt to contact as well:    Diabetes: uncontrolled diabetes  recommend increasing Jardiance to 25 mg daily; I have sent a new prescription to your pharmacy.   Start weekly injections of Ozempic 0.25 mg     Kidney:  Your kidney levels is slightly elevated. This may be related to dehydration. Recommend increasing your fluid intake 8 oz of water 4-6 times a day.   I would like to recheck your kidney function is 2 weeks.     TC: pls schedule lab only visit in 2 weeks    DANIEL Arita CNP    Spoke to Chris Blake ([facility] on file) and patient to relay provider test results and recommendation above.  Pharmacy medications sent to provided.  RN advised to reach out to clinic with any questions if having issue receiving medications.  Chris hung up before RN have had the opportunity to assist with scheduling a follow up lab only appointment.  Call patient back but phone line had been busy.  Will call again at another time to assist with lab only appointment.    Mt Doyle, RN  [facility]

## 2024-05-18 DIAGNOSIS — E11.65 TYPE 2 DIABETES MELLITUS WITH HYPERGLYCEMIA, WITHOUT LONG-TERM CURRENT USE OF INSULIN (H): ICD-10-CM

## 2024-05-20 ENCOUNTER — TELEPHONE (OUTPATIENT)
Dept: FAMILY MEDICINE | Facility: CLINIC | Age: 79
End: 2024-05-20

## 2024-05-20 DIAGNOSIS — E11.65 TYPE 2 DIABETES MELLITUS WITH HYPERGLYCEMIA, WITHOUT LONG-TERM CURRENT USE OF INSULIN (H): ICD-10-CM

## 2024-05-20 NOTE — TELEPHONE ENCOUNTER
Chris BEASLEY called (c2c on file) request refill of the metformin.    It appeared medication already pended awaiting provider review and approval.    Mt Doyle RN  [facility]

## 2024-05-21 ENCOUNTER — LAB (OUTPATIENT)
Dept: LAB | Facility: CLINIC | Age: 79
End: 2024-05-21

## 2024-05-21 DIAGNOSIS — N18.31 ANEMIA DUE TO STAGE 3A CHRONIC KIDNEY DISEASE (H): Primary | ICD-10-CM

## 2024-05-21 DIAGNOSIS — E86.0 DEHYDRATION: ICD-10-CM

## 2024-05-21 DIAGNOSIS — D63.1 ANEMIA DUE TO STAGE 3A CHRONIC KIDNEY DISEASE (H): Primary | ICD-10-CM

## 2024-05-21 PROCEDURE — 80048 BASIC METABOLIC PNL TOTAL CA: CPT

## 2024-05-21 PROCEDURE — 36415 COLL VENOUS BLD VENIPUNCTURE: CPT

## 2024-05-21 NOTE — TELEPHONE ENCOUNTER
Waylon Arizmendi --    Please review and advise: metformin script.   Pended below with pharmacy.     Yoanna ([facility]) called regarding refill on metformin. Second time calling.     ROSE MARIE BeaverN RN  [facility]

## 2024-05-22 LAB
ANION GAP SERPL CALCULATED.3IONS-SCNC: 13 MMOL/L (ref 7–15)
BUN SERPL-MCNC: 13.4 MG/DL (ref 8–23)
CALCIUM SERPL-MCNC: 9.1 MG/DL (ref 8.8–10.2)
CHLORIDE SERPL-SCNC: 103 MMOL/L (ref 98–107)
CREAT SERPL-MCNC: 1.22 MG/DL (ref 0.67–1.17)
DEPRECATED HCO3 PLAS-SCNC: 23 MMOL/L (ref 22–29)
EGFRCR SERPLBLD CKD-EPI 2021: 61 ML/MIN/1.73M2
GLUCOSE SERPL-MCNC: 233 MG/DL (ref 70–99)
POTASSIUM SERPL-SCNC: 4.5 MMOL/L (ref 3.4–5.3)
SODIUM SERPL-SCNC: 139 MMOL/L (ref 135–145)

## 2024-05-22 NOTE — TELEPHONE ENCOUNTER
Writer called and spoke with Yoanna ([facility]) regarding metformin being sent over to pharmacy.     ROSE MARIE BeaverN RN  [facility]

## 2024-05-29 ENCOUNTER — TELEPHONE (OUTPATIENT)
Dept: FAMILY MEDICINE | Facility: CLINIC | Age: 79
End: 2024-05-29

## 2024-05-29 NOTE — TELEPHONE ENCOUNTER
Yoanna Connors calling ([facility] on file)  Looking for labs from 5/21    Results sent to pt/Yoanna per her request but can you please comment on results and we can relay your recommendations/interpretation to pt    Yulissa Nash RN, BSN  [facility]

## 2024-06-01 DIAGNOSIS — R80.9 TYPE 2 DIABETES MELLITUS WITH DIABETIC MICROALBUMINURIA, WITHOUT LONG-TERM CURRENT USE OF INSULIN (H): ICD-10-CM

## 2024-06-01 DIAGNOSIS — E11.29 TYPE 2 DIABETES MELLITUS WITH DIABETIC MICROALBUMINURIA, WITHOUT LONG-TERM CURRENT USE OF INSULIN (H): ICD-10-CM

## 2024-06-03 NOTE — TELEPHONE ENCOUNTER
Yoanna, calling to check on refill request for Ozempic. Wondering if dose will change? ([facility] on file)    Message sent to PCP, Waylon Palafox RN  [facility]

## 2024-06-04 ENCOUNTER — TELEPHONE (OUTPATIENT)
Dept: FAMILY MEDICINE | Facility: CLINIC | Age: 79
End: 2024-06-04

## 2024-06-04 RX ORDER — SEMAGLUTIDE 0.68 MG/ML
INJECTION, SOLUTION SUBCUTANEOUS
Qty: 3 ML | Refills: 0 | Status: SHIPPED | OUTPATIENT
Start: 2024-06-04 | End: 2024-07-26

## 2024-06-04 NOTE — TELEPHONE ENCOUNTER
Yoanna, SO called for status of the Ozempic. Per chart reviewed, medication had been sent.  Pharmacy medication sent to provided.      Patient and Yoanna would like to THANK provider for providing good care for this patient for many years.  She will be missed.      Will route encounter to provider for an update.    Mt Doyle RN  [facility]

## 2024-06-04 NOTE — TELEPHONE ENCOUNTER
Result Notes     DANIEL Arita CNP  5/1/2024 11:23 PM CDT Back to Top      Team,     Letter sent to patient. Please attempt to contact as well:     Diabetes: uncontrolled diabetes  recommend increasing Jardiance to 25 mg daily; I have sent a new prescription to your pharmacy.  Start weekly injections of Ozempic 0.25 mg     Kidney:  Your kidney levels is slightly elevated. This may be related to dehydration. Recommend increasing your fluid intake 8 oz of water 4-6 times a day.  I would like to recheck your kidney function is 2 weeks.     TC: pls schedule lab only visit in 2 weeks     DANIEL Arita CNP

## 2024-06-04 NOTE — TELEPHONE ENCOUNTER
Yoanna calling again to request Ozempic refill be sent in and asking which dose patient should be administering going forward. Pended below for review.    HELEN Ellison, ROSE MARIEN, RN (she/her)  [facility] RN

## 2024-06-06 NOTE — TELEPHONE ENCOUNTER
Patient saw a missed call from us (potentially PCP ?) but writer does not see any specific information to relay. Yoanna again thanks Waylon waite for her care during their time with [facility].    ROSE MARIE NinoN, RN (she/her)  [facility] RN

## 2024-06-13 ENCOUNTER — TELEPHONE (OUTPATIENT)
Dept: FAMILY MEDICINE | Facility: CLINIC | Age: 79
End: 2024-06-13

## 2024-06-13 NOTE — TELEPHONE ENCOUNTER
"Shontel-Please review and may close encounter.    Call received from Yoanna.  Consent to communicate on file.    Yoanna asked if a party is being held for MARCIO Arizmendi CNP, as patient would like to attend? called. [facility] on file.    Yoanna asked to which clinic MARCIO Arizmendi CNP, will be going, as patient would like to follow her.    Patient and Yoanna wish her well and enjoy having her as patient's PCP.  \"We will miss her immensely.\"    Writer informed Yoanna writer is not aware of a party and will certainly forward message to MARCIO Arizmendi CNP.    Yoanna verbalized understanding.    Thank you!  ROSE MARIE SkinnerN, RN-BC  [facility]     "

## 2024-06-14 NOTE — TELEPHONE ENCOUNTER
Thank you for the kind words! It was a pleasure meeting you both and THANK YOU for allowing me to participate in your healthcare. I was absolutely a yisel!      I recommend scheduling follow up Dr. Aimee Jones in 2 months.  DANIEL Arita CNP

## 2024-06-17 ENCOUNTER — TELEPHONE (OUTPATIENT)
Dept: FAMILY MEDICINE | Facility: CLINIC | Age: 79
End: 2024-06-17

## 2024-06-17 NOTE — TELEPHONE ENCOUNTER
"Pt calling to ask if Waylon is here still    I did tell him that she is gone and he wanted to know what to do for his cares    Per Zulaydeyvi on 6/14 \" I recommend scheduling follow up Dr. Aimee Jones in 2 months. \"    Appt made in July, he did not want to wait until August    Yulissa Nash RN, BSN  [facility]         "

## 2024-06-17 NOTE — TELEPHONE ENCOUNTER
Left message relaying Karol note, also told patient to call back and schedule a 2 month follow up with Dr. Jones

## 2024-07-18 DIAGNOSIS — I10 HYPERTENSION GOAL BP (BLOOD PRESSURE) < 140/90: ICD-10-CM

## 2024-07-18 RX ORDER — VERAPAMIL HYDROCHLORIDE 120 MG/1
120 CAPSULE, EXTENDED RELEASE ORAL AT BEDTIME
Qty: 90 CAPSULE | Refills: 3 | OUTPATIENT
Start: 2024-07-18

## 2024-07-21 DIAGNOSIS — N40.1 BENIGN NON-NODULAR PROSTATIC HYPERPLASIA WITH LOWER URINARY TRACT SYMPTOMS: ICD-10-CM

## 2024-07-22 RX ORDER — TAMSULOSIN HYDROCHLORIDE 0.4 MG/1
0.4 CAPSULE ORAL DAILY
Qty: 90 CAPSULE | Refills: 2 | Status: SHIPPED | OUTPATIENT
Start: 2024-07-22

## 2024-07-25 ENCOUNTER — OFFICE VISIT (OUTPATIENT)
Dept: FAMILY MEDICINE | Facility: CLINIC | Age: 79
End: 2024-07-25

## 2024-07-25 VITALS
DIASTOLIC BLOOD PRESSURE: 78 MMHG | RESPIRATION RATE: 18 BRPM | HEART RATE: 100 BPM | SYSTOLIC BLOOD PRESSURE: 136 MMHG | HEIGHT: 72 IN | WEIGHT: 202.3 LBS | OXYGEN SATURATION: 99 % | BODY MASS INDEX: 27.4 KG/M2 | TEMPERATURE: 97.3 F

## 2024-07-25 DIAGNOSIS — R00.0 SINUS TACHYCARDIA: ICD-10-CM

## 2024-07-25 DIAGNOSIS — K76.0 HEPATIC STEATOSIS: ICD-10-CM

## 2024-07-25 DIAGNOSIS — D63.1 ANEMIA DUE TO STAGE 3A CHRONIC KIDNEY DISEASE (H): ICD-10-CM

## 2024-07-25 DIAGNOSIS — E11.65 UNCONTROLLED TYPE 2 DIABETES MELLITUS WITH HYPERGLYCEMIA (H): ICD-10-CM

## 2024-07-25 DIAGNOSIS — E11.9 TYPE 2 DIABETES, HBA1C GOAL < 7% (H): ICD-10-CM

## 2024-07-25 DIAGNOSIS — N18.31 ANEMIA DUE TO STAGE 3A CHRONIC KIDNEY DISEASE (H): ICD-10-CM

## 2024-07-25 DIAGNOSIS — R80.9 MICROALBUMINURIA DUE TO TYPE 2 DIABETES MELLITUS (H): ICD-10-CM

## 2024-07-25 DIAGNOSIS — K80.20 CALCULUS OF GALLBLADDER WITHOUT CHOLECYSTITIS WITHOUT OBSTRUCTION: ICD-10-CM

## 2024-07-25 DIAGNOSIS — Z29.11 NEED FOR PROPHYLACTIC VACCINATION AND INOCULATION AGAINST RESPIRATORY SYNCYTIAL VIRUS (RSV): ICD-10-CM

## 2024-07-25 DIAGNOSIS — Z23 NEED FOR SHINGLES VACCINE: ICD-10-CM

## 2024-07-25 DIAGNOSIS — Z76.89 ESTABLISHING CARE WITH NEW DOCTOR, ENCOUNTER FOR: Primary | ICD-10-CM

## 2024-07-25 DIAGNOSIS — R33.8 BENIGN PROSTATIC HYPERPLASIA WITH URINARY RETENTION: ICD-10-CM

## 2024-07-25 DIAGNOSIS — N18.2 CHRONIC KIDNEY DISEASE, STAGE 2 (MILD): ICD-10-CM

## 2024-07-25 DIAGNOSIS — K05.10 CHRONIC GINGIVITIS: ICD-10-CM

## 2024-07-25 DIAGNOSIS — Z97.8 CHRONIC INDWELLING FOLEY CATHETER: ICD-10-CM

## 2024-07-25 DIAGNOSIS — E04.2 MULTINODULAR GOITER: ICD-10-CM

## 2024-07-25 DIAGNOSIS — E11.29 MICROALBUMINURIA DUE TO TYPE 2 DIABETES MELLITUS (H): ICD-10-CM

## 2024-07-25 DIAGNOSIS — E78.5 HYPERLIPIDEMIA WITH TARGET LDL LESS THAN 100: ICD-10-CM

## 2024-07-25 DIAGNOSIS — N40.1 BENIGN PROSTATIC HYPERPLASIA WITH URINARY RETENTION: ICD-10-CM

## 2024-07-25 DIAGNOSIS — I10 HYPERTENSION GOAL BP (BLOOD PRESSURE) < 140/90: ICD-10-CM

## 2024-07-25 DIAGNOSIS — Z80.0 FAMILY HISTORY OF MALIGNANT NEOPLASM OF GASTROINTESTINAL TRACT: ICD-10-CM

## 2024-07-25 PROBLEM — A49.02 MRSA (METHICILLIN RESISTANT STAPHYLOCOCCUS AUREUS): Status: ACTIVE | Noted: 2022-11-28

## 2024-07-25 PROBLEM — A49.02 MRSA (METHICILLIN RESISTANT STAPHYLOCOCCUS AUREUS): Status: RESOLVED | Noted: 2022-11-28 | Resolved: 2024-07-25

## 2024-07-25 PROBLEM — N17.9 AKI (ACUTE KIDNEY INJURY) (H): Status: RESOLVED | Noted: 2022-12-05 | Resolved: 2024-07-25

## 2024-07-25 PROBLEM — I16.0 HYPERTENSIVE URGENCY: Status: ACTIVE | Noted: 2023-10-16

## 2024-07-25 PROBLEM — I16.0 HYPERTENSIVE URGENCY: Status: RESOLVED | Noted: 2023-10-16 | Resolved: 2024-07-25

## 2024-07-25 PROBLEM — M62.81 GENERALIZED MUSCLE WEAKNESS: Status: ACTIVE | Noted: 2023-10-16

## 2024-07-25 PROBLEM — R33.9 RETENTION OF URINE: Status: RESOLVED | Noted: 2022-12-05 | Resolved: 2024-07-25

## 2024-07-25 PROBLEM — M62.81 GENERALIZED MUSCLE WEAKNESS: Status: RESOLVED | Noted: 2023-10-16 | Resolved: 2024-07-25

## 2024-07-25 PROBLEM — N17.9 AKI (ACUTE KIDNEY INJURY) (H): Status: ACTIVE | Noted: 2022-12-05

## 2024-07-25 PROBLEM — R33.9 RETENTION OF URINE: Status: ACTIVE | Noted: 2022-12-05

## 2024-07-25 LAB
ALBUMIN SERPL BCG-MCNC: 4 G/DL (ref 3.5–5.2)
ALP SERPL-CCNC: 75 U/L (ref 40–150)
ALT SERPL W P-5'-P-CCNC: 14 U/L (ref 0–70)
ANION GAP SERPL CALCULATED.3IONS-SCNC: 14 MMOL/L (ref 7–15)
AST SERPL W P-5'-P-CCNC: 15 U/L (ref 0–45)
BASOPHILS # BLD AUTO: 0 10E3/UL (ref 0–0.2)
BASOPHILS NFR BLD AUTO: 0 %
BILIRUB SERPL-MCNC: 0.6 MG/DL
BUN SERPL-MCNC: 13.4 MG/DL (ref 8–23)
CALCIUM SERPL-MCNC: 8.8 MG/DL (ref 8.8–10.4)
CHLORIDE SERPL-SCNC: 100 MMOL/L (ref 98–107)
CHOLEST SERPL-MCNC: 76 MG/DL
CREAT SERPL-MCNC: 1.28 MG/DL (ref 0.67–1.17)
CREAT UR-MCNC: 42.3 MG/DL
EGFRCR SERPLBLD CKD-EPI 2021: 58 ML/MIN/1.73M2
EOSINOPHIL # BLD AUTO: 0.2 10E3/UL (ref 0–0.7)
EOSINOPHIL NFR BLD AUTO: 3 %
ERYTHROCYTE [DISTWIDTH] IN BLOOD BY AUTOMATED COUNT: 13.2 % (ref 10–15)
FASTING STATUS PATIENT QL REPORTED: NO
FASTING STATUS PATIENT QL REPORTED: NO
FERRITIN SERPL-MCNC: 188 NG/ML (ref 31–409)
GLUCOSE SERPL-MCNC: 209 MG/DL (ref 70–99)
HBA1C MFR BLD: 8.3 % (ref 0–5.6)
HCO3 SERPL-SCNC: 22 MMOL/L (ref 22–29)
HCT VFR BLD AUTO: 43.6 % (ref 40–53)
HDLC SERPL-MCNC: 21 MG/DL
HGB BLD-MCNC: 14.2 G/DL (ref 13.3–17.7)
IMM GRANULOCYTES # BLD: 0 10E3/UL
IMM GRANULOCYTES NFR BLD: 0 %
LDLC SERPL CALC-MCNC: 27 MG/DL
LYMPHOCYTES # BLD AUTO: 1.6 10E3/UL (ref 0.8–5.3)
LYMPHOCYTES NFR BLD AUTO: 24 %
MCH RBC QN AUTO: 28.9 PG (ref 26.5–33)
MCHC RBC AUTO-ENTMCNC: 32.6 G/DL (ref 31.5–36.5)
MCV RBC AUTO: 89 FL (ref 78–100)
MICROALBUMIN UR-MCNC: 205 MG/L
MICROALBUMIN/CREAT UR: 484.63 MG/G CR (ref 0–17)
MONOCYTES # BLD AUTO: 0.5 10E3/UL (ref 0–1.3)
MONOCYTES NFR BLD AUTO: 7 %
NEUTROPHILS # BLD AUTO: 4.3 10E3/UL (ref 1.6–8.3)
NEUTROPHILS NFR BLD AUTO: 65 %
NONHDLC SERPL-MCNC: 55 MG/DL
PLATELET # BLD AUTO: 365 10E3/UL (ref 150–450)
POTASSIUM SERPL-SCNC: 4.1 MMOL/L (ref 3.4–5.3)
PROT SERPL-MCNC: 7.5 G/DL (ref 6.4–8.3)
RBC # BLD AUTO: 4.91 10E6/UL (ref 4.4–5.9)
SODIUM SERPL-SCNC: 136 MMOL/L (ref 135–145)
TRIGL SERPL-MCNC: 139 MG/DL
TSH SERPL DL<=0.005 MIU/L-ACNC: 1.3 UIU/ML (ref 0.3–4.2)
VIT B12 SERPL-MCNC: 459 PG/ML (ref 232–1245)
WBC # BLD AUTO: 6.7 10E3/UL (ref 4–11)

## 2024-07-25 PROCEDURE — 82043 UR ALBUMIN QUANTITATIVE: CPT | Performed by: FAMILY MEDICINE

## 2024-07-25 PROCEDURE — 99214 OFFICE O/P EST MOD 30 MIN: CPT | Performed by: FAMILY MEDICINE

## 2024-07-25 PROCEDURE — 82728 ASSAY OF FERRITIN: CPT | Performed by: FAMILY MEDICINE

## 2024-07-25 PROCEDURE — 80061 LIPID PANEL: CPT | Performed by: FAMILY MEDICINE

## 2024-07-25 PROCEDURE — 80053 COMPREHEN METABOLIC PANEL: CPT | Performed by: FAMILY MEDICINE

## 2024-07-25 PROCEDURE — 84443 ASSAY THYROID STIM HORMONE: CPT | Performed by: FAMILY MEDICINE

## 2024-07-25 PROCEDURE — 85025 COMPLETE CBC W/AUTO DIFF WBC: CPT | Performed by: FAMILY MEDICINE

## 2024-07-25 PROCEDURE — 36415 COLL VENOUS BLD VENIPUNCTURE: CPT | Performed by: FAMILY MEDICINE

## 2024-07-25 PROCEDURE — 99207 PR FOOT EXAM NO CHARGE: CPT | Performed by: FAMILY MEDICINE

## 2024-07-25 PROCEDURE — 82570 ASSAY OF URINE CREATININE: CPT | Performed by: FAMILY MEDICINE

## 2024-07-25 PROCEDURE — 83036 HEMOGLOBIN GLYCOSYLATED A1C: CPT | Performed by: FAMILY MEDICINE

## 2024-07-25 PROCEDURE — 82607 VITAMIN B-12: CPT | Performed by: FAMILY MEDICINE

## 2024-07-25 ASSESSMENT — PAIN SCALES - GENERAL: PAINLEVEL: NO PAIN (0)

## 2024-07-25 NOTE — PROGRESS NOTES
The below note was dictated using voice recognition. Although reviewed after completion, some word and grammatical error may remain .       Assessment & Plan     Establishing care with new doctor, encounter for  Here today to establish care with wife Yoanna  New to this provider.    Diabetes not controlled HB A1c was 9.7 in April, is on metformin 1000 mg bid( GFR > 35),  jardiance 25 mg  and ozempic 0.25 wkly .asa & statin. Reports fs bid 184 to 210. CGM was not covered would like it resent. Will check labs today. For med monitoring and adjustment then make further recommendations reported was to see eye soon for a DM eye check. Reports hx of early cataract. Foot exam done today is unremarkable.   Hemoglobin A1c came back improved to 8.3 but still not at goal, to increase Ozempic 0.5 mg once a week and recheck diabetes in 3 months as discussed. Kidney function is slightly worse estimated kidney function is 58%. Currently kidney function still allows to continue for metformin.at same dose. Electrolytes and liver tests are fine. Glucose is elevated due to diabetes. Currently looks like has enough Crestor Jardiance metformin on file but I will order this so it is under my name. I will send a new prescription for the increased dose of Ozempic. Return in 3 months for a recheck    HLD on Crestor 20 mg. Will check lipids today. Cholesterol looks goal though HDL is very low  & encouraged to increase physical activity and continue Crestor at current dosing.    HTN  on verapamil 120 + 1/2 of 40 mg  & coreg 12.5 mg bid. Has no known   hx of Afib or a flutter, just sinus tachycardia it seems, No leg swelling. Not on a diuretic. No hx of Heart failure. Currently BP under control.will continue on current dosing of verapamil and carvedilol but in the future may need to make changes.to optimize care.    Has microalbuminuria. Microalbumin remains significantly elevated slightly improved from before, to continue Jardiance 25 mg but  recommend given mild worsening of kidney function to see a nephrologist to take over kidney care.    CKD 3, hx of reported anemia due to CKD, Drinks 6 to 8 glasses of water a day. Will check labs today. Kidney function is slightly worse estimated kidney function is 58%.Avoid anti-inflammatories stay well-hydrated continue Jardiance and recommend seeing nephrology. Cbc shows no anemia currently.     Sinus tachycardia pulse 100 to 105. appears chronic. Reports drinking enough water. No fever currently  he feels well. CBC is normal no anemia noted. Vitamin B 12 levels are adequate. Ferritin levels are normal. Thyroid function is normal. No current infection. Will continue to monitor.     Hx of multinodular goiter, reports had one nodule removed in past was benign. No obstructive symptoms. Will check TSH given pulse. Thyroid function is normal.    Reviewed recent hx of fever, AMS, nausea & vomiting on 7/18, seen in [facility] er, workup did not reveal UTI or pneumonia no COVID testing done. Declined admission,improved rapidly with IVF and discharged on emperic bactrim 3 days given due to prior MRSA UTI in past. Final Cx urine and blood reported negative. Feels well since.  Ct abdomen had shown mild distal esophageal wall thickening, unchanged. Potentially esophagitis. But reported no symptoms. Mild bibasilar atelectasis. Interval decrease in prior right pleural effusion, quite small. Numerous tiny stones in the gallbladder. No biliary dilatation. Mild steatosis.  No hydronephrosis. Bladder thick-walled but decompressed containing a Mathews catheter. Pancreas, bowels kidneys normal  Normal caliber appendix. No bowel obstruction.No significant retroperitoneal adenopathy. No abdominal aortic aneurysm. Patent portal vein.   Significant prostatic hypertrophy. No free fluid. No acute bony abnormalities.   Ct head showed no acute process just chronic ischemic small vessel disease not new. Cxr and labs were normal. Reports  no falls & still driving had his licence renewed in June. Unclear etiology of now resolved febrile AMS episode. Continue to monitor.    Has hx of BPH with urinary retention & a chronic indwelling catheter changed monthly & wears a leg bag. Was recent in [facility] ER on 7/24 for plugged trejo. This was changed. He sees [place] [facility] and has an upcoming apt with them. Of note had hx of MRSA UTI in 2022. I will defer Flomax management to his urologist at [facility] urology to manage since he has a chronic indwelling catheter in place it seems a little unusual to also be on Flomax. They were advised to contact the urologist regarding this medication.    Hx of chronic gingivitis and encouraged to see his dentist regularly.    Discussed rsv and shingles & could get at the pharmacy    Follow up in 3 months.     Uncontrolled type 2 diabetes mellitus with hyperglycemia (H)  Type 2 diabetes, Hb A1c goal < 7% (H)  Diabetes not controlled HB A1c was 9.7 in April, is on metformin 1000 mg bid( GFR > 35),  jardiance 25 mg  and ozempic 0.25 wkly .asa & statin. Reports fs bid 184 to 210. CGM was not covered would like it resent. Will check labs today. For med monitoring and adjustment then make further recommendations reported was to see eye soon for a DM eye check. Reports hx of early cataract. Foot exam done today is unremarkable.   Hemoglobin A1c came back improved to 8.3 but still not at goal, to increase Ozempic 0.5 mg once a week and recheck diabetes in 3 months as discussed. Kidney function is slightly worse estimated kidney function is 58%. Currently kidney function still allows to continue for metformin.at same dose. Electrolytes and liver tests are fine. Glucose is elevated due to diabetes. Currently looks like has enough Crestor Jardiance metformin on file but I will order this so it is under my name. I will send a new prescription for the increased dose of Ozempic. Return in 3 months for a recheck  -  Comprehensive metabolic panel; Future  - TSH with free T 4 reflex; Future  - Lipid panel reflex to direct LDL Non-fasting; Future  - Albumin Random Urine Quantitative with Creat Ratio; Future  - Hemoglobin A1c; Future  - FOOT EXAM  - Continuous Glucose Sensor (FREESTYLE HARPAL 2 SENSOR) MISC; 1 each every 14 days Use 1 sensor every 14 days. Use to read blood sugars per 's instructions.  - Comprehensive metabolic panel  - TSH with free T 4 reflex  - Lipid panel reflex to direct LDL Non-fasting  - Albumin Random Urine Quantitative with Creat Ratio  - Hemoglobin A1c  - empagliflozin (JARDIANCE) 25 MG TABS tablet; Take 1 tablet (25 mg) by mouth daily  - metFORMIN (GLUCOPHAGE) 1000 MG tablet; TAKE ONE TABLET BY MOUTH TWICE A DAY WITH MEALS  - semaglutide (OZEMPIC, 0.25 OR 0.5 MG/DOSE,) 2 MG/3ML pen; Inject 0.5 mg subcutaneously every 7 days    Hyperlipidemia with target LDL less than 100  HLD on Crestor 20 mg. Will check lipids today. Cholesterol looks goal though HDL is very low  & encouraged to increase physical activity and continue Crestor at current dosing.   - Comprehensive metabolic panel; Future  - TSH with free T 4 reflex; Future  - Lipid panel reflex to direct LDL Non-fasting; Future  - Comprehensive metabolic panel  - TSH with free T 4 reflex  - Lipid panel reflex to direct LDL Non-fasting  - rosuvastatin (CRESTOR) 20 MG tablet; Take 1 tablet (20 mg) by mouth daily    Hypertension goal BP (blood pressure) < 140/90  HTN  on verapamil 120 + 1/2 of 40 mg  & coreg 12.5 mg bid. Has no known   hx of Afib or a flutter, just sinus tachycardia it seems, No leg swelling. Not on a diuretic. No hx of Heart failure. Currently BP under control.will continue on current dosing of verapamil and carvedilol but in the future may need to make changes.to optimize care.  - Comprehensive metabolic panel; Future  - TSH with free T 4 reflex; Future  - Lipid panel reflex to direct LDL Non-fasting; Future  - Albumin Random  Urine Quantitative with Creat Ratio; Future  - Comprehensive metabolic panel  - TSH with free T 4 reflex  - Lipid panel reflex to direct LDL Non-fasting  - Albumin Random Urine Quantitative with Creat Ratio    Microalbuminuria due to type 2 diabetes mellitus (H)  Has microalbuminuria. Microalbumin remains significantly elevated slightly improved from before, to continue Jardiance 25 mg but recommend given mild worsening of kidney function to see a nephrologist to take over kidney care.  - empagliflozin (JARDIANCE) 25 MG TABS tablet; Take 1 tablet (25 mg) by mouth daily  - Adult Nephrology  Referral; Future    Chronic kidney disease, stage 2 (mild)  CKD 3, hx of reported anemia due to CKD, Drinks 6 to 8 glasses of water a day. Will check labs today. Kidney function is slightly worse estimated kidney function is 58%.Avoid anti-inflammatories stay well-hydrated continue Jardiance and recommend seeing nephrology. Cbc shows no anemia currently.   - CBC with Platelets & Differential; Future  - Comprehensive metabolic panel; Future  - TSH with free T 4 reflex; Future  - Lipid panel reflex to direct LDL Non-fasting; Future  - Albumin Random Urine Quantitative with Creat Ratio; Future  - CBC with Platelets & Differential  - Comprehensive metabolic panel  - TSH with free T 4 reflex  - Lipid panel reflex to direct LDL Non-fasting  - Albumin Random Urine Quantitative with Creat Ratio  - empagliflozin (JARDIANCE) 25 MG TABS tablet; Take 1 tablet (25 mg) by mouth daily  - Adult Nephrology  Referral; Future    Anemia due to stage 3a chronic kidney disease (H)  Noted resolved as CBC came back wnl  - CBC with Platelets & Differential; Future  - Ferritin; Future  - Vitamin B 12; Future  - CBC with Platelets & Differential  - Ferritin  - Vitamin B 12    Sinus tachycardia  Sinus tachycardia pulse 100 to 105. appears chronic. Reports drinking enough water. No fever currently  he feels well. CBC is normal no anemia  noted. Vitamin B 12 levels are adequate. Ferritin levels are normal. Thyroid function is normal. No current infection. Will continue to monitor.     Reviewed recent hx of fever, AMS, nausea & vomiting on 7/18, seen in [facility] er, workup did not reveal UTI or pneumonia no COVID testing done. Declined admission,improved rapidly with IVF and discharged on emperic bactrim 3 days given due to prior MRSA UTI in past. Final Cx urine and blood reported negative. Feels well since.  Ct abdomen had shown mild distal esophageal wall thickening, unchanged. Potentially esophagitis. But reported no symptoms. Mild bibasilar atelectasis. Interval decrease in prior right pleural effusion, quite small. Numerous tiny stones in the gallbladder. No biliary dilatation. Mild steatosis.  No hydronephrosis. Bladder thick-walled but decompressed containing a Mathews catheter. Pancreas, bowels kidneys normal  Normal caliber appendix. No bowel obstruction.No significant retroperitoneal adenopathy. No abdominal aortic aneurysm. Patent portal vein.   Significant prostatic hypertrophy. No free fluid. No acute bony abnormalities.   Ct head showed no acute process just chronic ischemic small vessel disease not new. Cxr and labs were normal. Reports no falls & still driving had his license renewed in June. Unclear etiology of now resolved febrile AMS episode. Continue to monitor.    Multinodular goiter  Hx of multinodular goiter, reports had one nodule removed in past was benign. No obstructive symptoms. Will check TSH given pulse. Thyroid function is normal.    Hepatic steatosis  Noted on recent ct. Likely related to dm & HLD Optimizing that will help prevent progression of steatosis.    Calculus of gallbladder without cholecystitis without obstruction  Noted on ct asymptomatic     Benign prostatic hyperplasia with urinary retention  Chronic indwelling Mathews catheter  Has hx of BPH with urinary retention & a chronic indwelling catheter changed  monthly & wears a leg bag. Was recent in [facility] ER on 7/24 for plugged trejo. This was changed. He sees [facility] and has an upcoming apt with them. Of note had hx of MRSA UTI in 2022. I will defer Flomax management to his urologist at [facility] u[facility] to manage since he has a chronic indwelling catheter in place it seems a little unusual to also be on Flomax. They were advised to contact the urologist regarding this medication.  - Adult Urology  Referral; Future    Chronic gingivitis  Hx of chronic gingivitis and encouraged to see his dentist regularly.    Family history of malignant neoplasm of gastrointestinal tract  He denied having this dx, said his dad do not have colon cancer as recorded in fh and fh was updated in epic    Need for prophylactic vaccination and inoculation against respiratory syncytial virus (RSV)  Need for shingles vaccine  Discussed rsv and shingles & could get at the pharmacy    Follow up in 3 months.    BMI  Estimated body mass index is 27.44 kg/m  as calculated from the following:    Height as of this encounter: 1.829 m (6').    Weight as of this encounter: 91.8 kg (202 lb 4.8 oz).   Weight management plan: Discussed healthy diet and exercise guidelines  Blood sugar testing frequency justification:  Uncontrolled diabetes and Adjustment of medication(s)    Regular exercise  See Patient Instructions        Genia Hernandez is a 79 year old, presenting for the following health issues:  Diabetes        7/25/2024     1:50 PM   Additional Questions   Roomed by Bradley SANFORD   Accompanied by wife     Via the Health Maintenance questionnaire, the patient has reported the following services have been completed -Eye Exam: teresa 2024-02-10, this information has been sent to the abstraction team.  History of Present Illness       Diabetes:   He presents for follow up of diabetes.  He is checking home blood glucose two times daily.   He checks blood glucose before and  after meals.  Blood glucose is never over 200 and never under 70.  When his blood glucose is low, the patient is asymptomatic for confusion, blurred vision, lethargy and reports not feeling dizzy, shaky, or weak.   He has no concerns regarding his diabetes at this time.   He is not experiencing numbness or burning in feet, excessive thirst, blurry vision, weight changes or redness, sores or blisters on feet. The patient has had a diabetic eye exam in the last 12 months. Eye exam performed on Feburary2024. Location of last eye exam eye clinic.        Hypertension: He presents for follow up of hypertension.  He does check blood pressure  regularly outside of the clinic. Outpatient blood pressures have not been over 140/90. He does not follow a low salt diet.     He eats 2-3 servings of fruits and vegetables daily.He consumes 0 sweetened beverage(s) daily.He exercises with enough effort to increase his heart rate 20 to 29 minutes per day.  He exercises with enough effort to increase his heart rate 3 or less days per week.   He is taking medications regularly.     79-year-old gentleman with history of uncontrolled diabetes, currently on Jardiance, Ozempic 0.25 and metformin, hyperlipidemia on Crestor, hypertension previously on lisinopril now on verapamil and Coreg, hx of sinus tachycardia bit does not have history of A-fib or flutter no history of ischemic heart disease or MI, pulse in the 100's, has hx of microalbuminuria, chronic kidney disease stage II reported history of anemia due to chronic kidney disease, unclear if has seen nephrology in the past, hx of BI 2022, no anemia noted on recent labs, multinodular goiter, reported had a nodule removed in 2003 was benign,  hx of urinary retention has a chronic indwelling Mathews catheter, with leg bag, history of possible BPH under care of urology at [facility] has appointment with them, hx of prior MRSA positive UTI, and noted was seen in ER 7/25 for malfunctioning Mathews,  unclear why also on Flomax, hxof circumcision age 10,  asymptomatic gallstones noted on ct, hx of chronic gingivitis, family history of GI cancer noted in epic but patient denies that his dad had a hx of colorectal cancer so not sure how accurate the information is, hx of fall and head injury 10/15/23 when seen at [facility] er and ct face, c spine, head showed no acute hemorrhage or fracture, mild to mod vol loss and presumed choric small vessel ischemic changes of brain, repeat ct face and head in 1/2024 showed no new findings, 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    Seen at [facility] er on 7/18/24 brought in by EMS for weakness, confusion, and vomiting. Noted had been well. Had been to [employer] earlier& later wife reported he became newly confused and had difficulty driving. He was apparently pulled over by the police, denied alcohol use, and was followed home for his safety. Apparently was too weak to ambulate when returning home prompting EMS call. He was febrile for EMS at 100.7 orally. EMS fingerstick 215. EMS BPs 180's systolic. No interventions performed by EMS. His trejo had been changed 3 days prior ( changed monthly). Vital signs on arrival with hypertension, fever, tachycardia.he appeared encephalopathic with confusion to date, recent events, and current president. He appeared globally weak and had difficulty with performing tasks and participating with neurological exam. He was moving all extremities. Had no obvious facial droop or pronator drift. No nuchal rigidity. Trejo was changed out. New urine cultured. initial workup included CBC ( normal) , BMP( na 134), LFT's ( normal)  lipase ( normal) , troponin ( normal) , ethanol level ( normal) , blood cultures x2, point of care  lactate, troponin, EKG, non contrast head CT, abdomen/pelvis with IV contrast, & was given Zosyn, vancomycin, Tylenol suppository, and IV fluids ordered. Lactate was elevated and given IVF bolus. Had normal lipase, troponin, EKG showed sinus tach, had no leucocytosis, undetectable alcohol, ct head unremarkable, CXR subtle density in left lung ? Atelectasis versus early pneumonia, ct abdomen showed gall stones with no cholecystis. His mental status improved though remained tachycardic.UA was diff to interpret suggested colonization, later Cx came back negative. A repeat lactate was negative. He declined admission and was sent home on bactrim 3 days to cover possible MRSA and gram neg based on prior culture. Suspicion for pneumonia was low. He returned to [facility] er 24 for catheter malfunction and urinary retention with elevated BP. His catheter was replaced with resolution of symptoms and BP improved and was discharged home. They felt later might have been a case of food poisoning as recovered from that pretty quickly and all tests did not indicate source of fever or vomiting    He was born and brought up in [place], his dad was the first . Patient was a  and a .  28 years. Lives with third wife. Only has a dog now, cat . Current wife Donald is a retired nurse, reported has maybe 11 total children from his prior relationships    Here today to establish care. With wife donald  New to this provider.    Diabetes ot controlled HB A1c was 9/7 in April, is on metformin 1000 mg bid( GFR > 35),  jardiance 25 mg  and ozempic 0.25 wkly . asa & statin. Reports fs bid 184 to 210. CGM was not covered would like it resent. Will check labs today. For med monitoring and adjustment then make further recommendations reported was to see eye soon for a DM eye check. Reports hx of early cataract. Foot exam done today no sore.     HLD on Crestor 20 mg. Will check lipids  today    HTN  on verapamil 120 + 1/2 of 40 mg  & coreg 12.5 mg bid.   No hx of Afib or a flutter  No leg swelling   Not on a diuretic  No hx of Heart failure    Has microalbuminuria    CKD 3, hx of anemia due to CKD, Drinks 6 to 8 glasses of water a day. Will check labs today     Sinus tachycardia pulse 100 to 105. appears chronic. Reports drinking enough water. No fever currently  he feels well.     Hx of multinodular goiter, reports had one nodule removed in past was benign. No obstructive symptoms. Will check TSH given pulse    Reviewed recent hx of fever, AMS, nausea & vomiting on 7/18[facility] workup did not reveal UTI or pneumonia no COVID testing done. Declined admission,improved rapidly with IVF and discharged on emperic bactrim 3 days given prior MRSA UTI in past. Final Cx urine and blood reported negative. Feels well since.   Ct abdomen had shown mild distal esophageal wall thickening, unchanged. Potentially esophagitis. But reported no symptoms. Mild bibasilar atelectasis. Interval decrease in prior right pleural effusion,  quite small. Numerous tiny stones in the gallbladder. No biliary dilatation. Mild steatosis.  No hydronephrosis. Bladder thick-walled but decompressed containing a Trejo catheter. Pancreas, bowels kidneys normal  Normal caliber appendix. No bowel obstruction.No significant retroperitoneal adenopathy. No abdominal aortic aneurysm. Patent portal vein.   Significant prostatic hypertrophy. No free fluid. No acute bony abnormalities.   Ct head showed no acute process just chronic ischemic small vessel disease not new. Cxr and labs were normal. Reports no falls & still driving had his license renewed in June    Has hx of BPH with urinary retention & a chronic indwelling catheter changed monthly & wears a leg bag. [facility] for plugged trejo. This was changed. He sees [facility] and has an upcoming apt with them. Of note had hx of MRSA UTI in  2022.     Hx of chronic gingivitis and sees the dentist    Discussed rsv and shingles to get at the pharmacy      Review of Systems  Constitutional, HEENT, cardiovascular, pulmonary, GI, , musculoskeletal, neuro, skin, endocrine and psych systems are negative, except as otherwise noted.      Objective    /78 (BP Location: Right arm, Patient Position: Sitting, Cuff Size: Adult Regular)   Pulse 105   Temp 97.3  F (36.3  C) (Temporal)   Resp 18   Ht 1.829 m (6')   Wt 91.8 kg (202 lb 4.8 oz)   SpO2 99%   BMI 27.44 kg/m    Body mass index is 27.44 kg/m .  Physical Exam   GENERAL: alert, no distress, and over weight  EYES: Eyes grossly normal to inspection, PERRL and conjunctivae and sclerae normal, glasses   HENT: ear canals and TM's normal, nose and mouth without ulcers or lesions  NECK: no adenopathy, no asymmetry, masses, or scars  RESP: lungs clear to auscultation - no rales, rhonchi or wheezes  CV: tachycardia, normal S1 S2, no S3 or S4, no murmur, click or rub, peripheral pulses strong, and no peripheral edema  ABDOMEN: soft, non tender, no hepatosplenomegaly, no masses and bowel sounds normal  MS: no gross musculoskeletal defects noted, no edema, leg bag attached to right lower leg clear urine in bag  SKIN: no suspicious lesions or rashes  NEURO: Normal strength and tone, mentation intact and speech normal  PSYCH: mentation appears normal, affect normal/bright  Diabetic foot exam: normal DP and PT pulses, no trophic changes or ulcerative lesions, normal sensory exam, normal monofilament exam, and nail exam normal nails without lesions    Results for orders placed or performed in visit on 07/25/24   Comprehensive metabolic panel     Status: Abnormal   Result Value Ref Range    Sodium 136 135 - 145 mmol/L    Potassium 4.1 3.4 - 5.3 mmol/L    Carbon Dioxide (CO2) 22 22 - 29 mmol/L    Anion Gap 14 7 - 15 mmol/L    Urea Nitrogen 13.4 8.0 - 23.0 mg/dL    Creatinine 1.28 (H) 0.67 - 1.17 mg/dL    GFR  Estimate 58 (L) >60 mL/min/1.73m2    Calcium 8.8 8.8 - 10.4 mg/dL    Chloride 100 98 - 107 mmol/L    Glucose 209 (H) 70 - 99 mg/dL    Alkaline Phosphatase 75 40 - 150 U/L    AST 15 0 - 45 U/L    ALT 14 0 - 70 U/L    Protein Total 7.5 6.4 - 8.3 g/dL    Albumin 4.0 3.5 - 5.2 g/dL    Bilirubin Total 0.6 <=1.2 mg/dL    Patient Fasting > 8hrs? No    TSH with free T4 reflex     Status: Normal   Result Value Ref Range    TSH 1.30 0.30 - 4.20 uIU/mL   Lipid panel reflex to direct LDL Non-fasting     Status: Abnormal   Result Value Ref Range    Cholesterol 76 <200 mg/dL    Triglycerides 139 <150 mg/dL    Direct Measure HDL 21 (L) >=40 mg/dL    LDL Cholesterol Calculated 27 <=100 mg/dL    Non HDL Cholesterol 55 <130 mg/dL    Patient Fasting > 8hrs? No     Narrative    Cholesterol  Desirable:  <200 mg/dL    Triglycerides  Normal:  Less than 150 mg/dL  Borderline High:  150-199 mg/dL  High:  200-499 mg/dL  Very High:  Greater than or equal to 500 mg/dL    Direct Measure HDL  Female:  Greater than or equal to 50 mg/dL   Male:  Greater than or equal to 40 mg/dL    LDL Cholesterol  Desirable:  <100mg/dL  Above Desirable:  100-129 mg/dL   Borderline High:  130-159 mg/dL   High:  160-189 mg/dL   Very High:  >= 190 mg/dL    Non HDL Cholesterol  Desirable:  130 mg/dL  Above Desirable:  130-159 mg/dL  Borderline High:  160-189 mg/dL  High:  190-219 mg/dL  Very High:  Greater than or equal to 220 mg/dL   Albumin Random Urine Quantitative with Creat Ratio     Status: Abnormal   Result Value Ref Range    Creatinine Urine mg/dL 42.3 mg/dL    Albumin Urine mg/L 205.0 mg/L    Albumin Urine mg/g Cr 484.63 (H) 0.00 - 17.00 mg/g Cr   Hemoglobin A1c     Status: Abnormal   Result Value Ref Range    Hemoglobin A1C 8.3 (H) 0.0 - 5.6 %   Ferritin     Status: Normal   Result Value Ref Range    Ferritin 188 31 - 409 ng/mL   Vitamin B12     Status: Normal   Result Value Ref Range    Vitamin B12 459 232 - 1,245 pg/mL   CBC with platelets and  differential     Status: None   Result Value Ref Range    WBC Count 6.7 4.0 - 11.0 10e3/uL    RBC Count 4.91 4.40 - 5.90 10e6/uL    Hemoglobin 14.2 13.3 - 17.7 g/dL    Hematocrit 43.6 40.0 - 53.0 %    MCV 89 78 - 100 fL    MCH 28.9 26.5 - 33.0 pg    MCHC 32.6 31.5 - 36.5 g/dL    RDW 13.2 10.0 - 15.0 %    Platelet Count 365 150 - 450 10e3/uL    % Neutrophils 65 %    % Lymphocytes 24 %    % Monocytes 7 %    % Eosinophils 3 %    % Basophils 0 %    % Immature Granulocytes 0 %    Absolute Neutrophils 4.3 1.6 - 8.3 10e3/uL    Absolute Lymphocytes 1.6 0.8 - 5.3 10e3/uL    Absolute Monocytes 0.5 0.0 - 1.3 10e3/uL    Absolute Eosinophils 0.2 0.0 - 0.7 10e3/uL    Absolute Basophils 0.0 0.0 - 0.2 10e3/uL    Absolute Immature Granulocytes 0.0 <=0.4 10e3/uL   CBC with Platelets & Differential     Status: None    Narrative    The following orders were created for panel order CBC with Platelets & Differential.  Procedure                               Abnormality         Status                     ---------                               -----------         ------                     CBC with platelets and d...[[id number]]                      Final result                 Please view results for these tests on the individual orders.     No results found for this or any previous visit (from the past 24 hour(s)).        Signed Electronically by: Aimee Jones MD

## 2024-07-26 ENCOUNTER — TELEPHONE (OUTPATIENT)
Dept: FAMILY MEDICINE | Facility: CLINIC | Age: 79
End: 2024-07-26

## 2024-07-26 RX ORDER — ROSUVASTATIN CALCIUM 20 MG/1
20 TABLET, COATED ORAL DAILY
Qty: 90 TABLET | Refills: 1 | Status: SHIPPED | OUTPATIENT
Start: 2024-07-26

## 2024-07-26 RX ORDER — SEMAGLUTIDE 0.68 MG/ML
0.5 INJECTION, SOLUTION SUBCUTANEOUS
Qty: 3 ML | Refills: 2 | Status: SHIPPED | OUTPATIENT
Start: 2024-07-26

## 2024-07-26 NOTE — RESULT ENCOUNTER NOTE
Call patient and his wife about the results and then mail them results.  CBC is normal no anemia noted.  Vitamin B12 levels are adequate.  Ferritin levels are normal.  Thyroid function is normal.  Hemoglobin A1c is improved to 8.3 but still not at goal increase Ozempic 2.5 mg once a week and recheck diabetes in 3 months as discussed.  Currently kidney function still allows to continue for metformin.  At same dose.  Microalbumin remains significantly elevated slightly improved from before continue Jardiance 25 mg but recommend given mild worsening of kidney function to see a nephrologist to take over kidney care.  Cholesterol looks goal though HDL is very low increase physical activity and continue Crestor at current dose.  Electrolytes and liver tests are fine.  Glucose is elevated due to diabetes.  Kidney function is slightly worse estimated kidney function is 58%.  Avoid anti-inflammatories stay well-hydrated continue Jardiance and recommend seeing nephrology.  Currently looks like has enough Crestor Jardiance metformin on file but I will order this so it is under my name.  I will send a new prescription for the increased dose of Ozempic.  Blood pressure is fine the pulse is elevated for now continue on current dosing of verapamil and carvedilol but in the future may need to make changes.  The only  medication I will not prescribe is the Flomax I will defer this to the urologist to manage since he has a chronic indwelling catheter in place it seems a little unusual to also be on Flomax and I will defer that to his regular urologist  [place] [facility] to prescribe.  They should contact the urologist regarding this medication.

## 2024-07-26 NOTE — LETTER
July 26, 2024      David Gutierrez .  [address]  [place] [address]        Dear ,    We are writing to inform you of your test results.    CBC is normal no anemia noted.  Vitamin B12 levels are adequate.  Ferritin levels are normal.  Thyroid function is normal.  Hemoglobin A1c is improved to 8.3 but still not at goal increase Ozempic 0.5 mg once a week and recheck diabetes in 3 months as discussed.  Currently kidney function still allows to continue for metformin.  At same dose.  Microalbumin remains significantly elevated slightly improved from before continue Jardiance 25 mg but recommend given mild worsening of kidney function to see a nephrologist to take over kidney care.  Cholesterol looks goal though HDL is very low increase physical activity and continue Crestor at current dose.  Electrolytes and liver tests are fine.  Glucose is elevated due to diabetes.  Kidney function is slightly worse estimated kidney function is 58%.  Avoid anti-inflammatories stay well-hydrated continue Jardiance and recommend seeing nephrology.  Currently looks like has enough Crestor Jardiance metformin on file but I will order this so it is under my name.  I will send a new prescription for the increased dose of Ozempic.  Blood pressure is fine the pulse is elevated for now continue on current dosing of verapamil and carvedilol but in the future may need to make changes.  The only  medication I will not prescribe is the Flomax I will defer this to the urologist to manage since he has a chronic indwelling catheter in place it seems a little unusual to also be on Flomax and I will defer that to his regular urologist  [place] [facility] to prescribe.  They should contact the urologist regarding this medication.    Resulted Orders   Comprehensive metabolic panel   Result Value Ref Range    Sodium 136 135 - 145 mmol/L    Potassium 4.1 3.4 - 5.3 mmol/L    Carbon Dioxide (CO2) 22 22 - 29 mmol/L    Anion  Gap 14 7 - 15 mmol/L    Urea Nitrogen 13.4 8.0 - 23.0 mg/dL    Creatinine 1.28 (H) 0.67 - 1.17 mg/dL    GFR Estimate 58 (L) >60 mL/min/1.73m2      Comment:      eGFR calculated using 2021 CKD-EPI equation.    Calcium 8.8 8.8 - 10.4 mg/dL      Comment:      Reference intervals for this test were updated on 7/16/2024 to reflect our healthy population more accurately. There may be differences in the flagging of prior results with similar values performed with this method. Those prior results can be interpreted in the context of the updated reference intervals.    Chloride 100 98 - 107 mmol/L    Glucose 209 (H) 70 - 99 mg/dL    Alkaline Phosphatase 75 40 - 150 U/L    AST 15 0 - 45 U/L    ALT 14 0 - 70 U/L    Protein Total 7.5 6.4 - 8.3 g/dL    Albumin 4.0 3.5 - 5.2 g/dL    Bilirubin Total 0.6 <=1.2 mg/dL    Patient Fasting > 8hrs? No    TSH with free T4 reflex   Result Value Ref Range    TSH 1.30 0.30 - 4.20 uIU/mL   Lipid panel reflex to direct LDL Non-fasting   Result Value Ref Range    Cholesterol 76 <200 mg/dL    Triglycerides 139 <150 mg/dL    Direct Measure HDL 21 (L) >=40 mg/dL    LDL Cholesterol Calculated 27 <=100 mg/dL    Non HDL Cholesterol 55 <130 mg/dL    Patient Fasting > 8hrs? No     Narrative    Cholesterol  Desirable:  <200 mg/dL    Triglycerides  Normal:  Less than 150 mg/dL  Borderline High:  150-199 mg/dL  High:  200-499 mg/dL  Very High:  Greater than or equal to 500 mg/dL    Direct Measure HDL  Female:  Greater than or equal to 50 mg/dL   Male:  Greater than or equal to 40 mg/dL    LDL Cholesterol  Desirable:  <100mg/dL  Above Desirable:  100-129 mg/dL   Borderline High:  130-159 mg/dL   High:  160-189 mg/dL   Very High:  >= 190 mg/dL    Non HDL Cholesterol  Desirable:  130 mg/dL  Above Desirable:  130-159 mg/dL  Borderline High:  160-189 mg/dL  High:  190-219 mg/dL  Very High:  Greater than or equal to 220 mg/dL   Albumin Random Urine Quantitative with Creat Ratio   Result Value Ref Range     Creatinine Urine mg/dL 42.3 mg/dL      Comment:      The reference ranges have not been established in urine creatinine. The results should be integrated into the clinical context for interpretation.    Albumin Urine mg/L 205.0 mg/L      Comment:      The reference ranges have not been established in urine albumin. The results should be integrated into the clinical context for interpretation.    Albumin Urine mg/g Cr 484.63 (H) 0.00 - 17.00 mg/g Cr      Comment:      Microalbuminuria is defined as an albumin:creatinine ratio of 17 to 299 for males and 25 to 299 for females. A ratio of albumin:creatinine of 300 or higher is indicative of overt proteinuria.  Due to biologic variability, positive results should be confirmed by a second, first-morning random or 24-hour timed urine specimen. If there is discrepancy, a third specimen is recommended. When 2 out of 3 results are in the microalbuminuria range, this is evidence for incipient nephropathy and warrants increased efforts at glucose control, blood pressure control, and institution of therapy with an angiotensin-converting-enzyme (ACE) inhibitor (if the patient can tolerate it).     Hemoglobin A1c   Result Value Ref Range    Hemoglobin A1C 8.3 (H) 0.0 - 5.6 %      Comment:      Normal <5.7%   Prediabetes 5.7-6.4%    Diabetes 6.5% or higher     Note: Adopted from ADA consensus guidelines.   Ferritin   Result Value Ref Range    Ferritin 188 31 - 409 ng/mL   Vitamin B12   Result Value Ref Range    Vitamin B12 459 232 - 1,245 pg/mL   CBC with platelets and differential   Result Value Ref Range    WBC Count 6.7 4.0 - 11.0 10e3/uL    RBC Count 4.91 4.40 - 5.90 10e6/uL    Hemoglobin 14.2 13.3 - 17.7 g/dL    Hematocrit 43.6 40.0 - 53.0 %    MCV 89 78 - 100 fL    MCH 28.9 26.5 - 33.0 pg    MCHC 32.6 31.5 - 36.5 g/dL    RDW 13.2 10.0 - 15.0 %    Platelet Count 365 150 - 450 10e3/uL    % Neutrophils 65 %    % Lymphocytes 24 %    % Monocytes 7 %    % Eosinophils 3 %    %  Basophils 0 %    % Immature Granulocytes 0 %    Absolute Neutrophils 4.3 1.6 - 8.3 10e3/uL    Absolute Lymphocytes 1.6 0.8 - 5.3 10e3/uL    Absolute Monocytes 0.5 0.0 - 1.3 10e3/uL    Absolute Eosinophils 0.2 0.0 - 0.7 10e3/uL    Absolute Basophils 0.0 0.0 - 0.2 10e3/uL    Absolute Immature Granulocytes 0.0 <=0.4 10e3/uL       If you have any questions or concerns, please call the clinic at the number listed above.       Sincerely,    Aimee Jones MD/glenda

## 2024-07-26 NOTE — TELEPHONE ENCOUNTER
Writer called and left message on patient's voicemail to call back and speak with a triage nurse.    ----- Message from Aimee Jones sent at 7/26/2024  7:23 AM CDT -----  Call patient and his significant other (CTC) about the results and then mail them results.  CBC is normal no anemia noted.  Vitamin B12 levels are adequate.  Ferritin levels are normal.  Thyroid function is normal.  Hemoglobin A1c is improved to 8.3 but still not at goal increase Ozempic 0.5 mg once a week and recheck diabetes in 3 months as discussed.  Currently kidney function still allows to continue for metformin.  At same dose.  Microalbumin remains significantly elevated slightly improved from before continue Jardiance 25 mg but recommend given mild worsening of kidney function to see a nephrologist to take over kidney care.  Cholesterol looks goal though HDL is very low increase physical activity and continue Crestor at current dose.  Electrolytes and liver tests are fine.  Glucose is elevated due to diabetes.  Kidney function is slightly worse estimated kidney function is 58%.  Avoid anti-inflammatories stay well-hydrated continue Jardiance and recommend seeing nephrology.  Currently looks like has enough Crestor Jardiance metformin on file but I will order this so it is under my name.  I will send a new prescription for the increased dose of Ozempic.  Blood pressure is fine the pulse is elevated for now continue on current dosing of verapamil and carvedilol but in the future may need to make changes.  The only  medication I will not prescribe is the Flomax I will defer this to the urologist to manage since he has a chronic indwelling catheter in place it seems a little unusual to also be on Flomax and I will defer that to his regular urologist  [place] [facility] to prescribe.  They should contact the urologist regarding this medication.    Sophie Burgess, ROSE MARIEN RN  [facility]

## 2024-07-26 NOTE — TELEPHONE ENCOUNTER
Support staff - please send results letter    Relayed results and POC to patient and significant other. Routing back to clinician to correct result note dosage for Ozempic. Already corrected in the note below.    ROSE MARIE NinoN, RN (she/her)  [facility] RN

## 2024-07-26 NOTE — TELEPHONE ENCOUNTER
Patient's significant other calling as she tried scheduling the nephrology referral and they stated there is no referral in place. Verified  number with caller.     Asked that she try calling them back and if they still cannot see the referral we can try re-sending it.     Writer does see a nephrology referral in place in patient's chart.     Trinidad Hicks RN  [facility]

## 2024-07-29 ENCOUNTER — TELEPHONE (OUTPATIENT)
Dept: NEPHROLOGY | Facility: CLINIC | Age: 79
End: 2024-07-29

## 2024-07-29 NOTE — TELEPHONE ENCOUNTER
Reached out to see if patient is available to come to see Nephrologist this afternoon at 1pm.  Patient reports he is having issues with his trejo and needs to take care of that first.  Updated that we have trejo bag at the office we can support him on if he is willing to come in.  Patient unable to make it today but to reach out if another appt is cancelled.  Deborah Carter RN, BSN, PHN  [facility] Nephrology Nurse Navigator  [phone]

## 2024-07-31 PROBLEM — R00.0 SINUS TACHYCARDIA: Status: ACTIVE | Noted: 2024-07-31

## 2024-07-31 PROBLEM — K05.10 CHRONIC GINGIVITIS: Status: ACTIVE | Noted: 2017-08-22

## 2024-07-31 PROBLEM — R33.8 BENIGN PROSTATIC HYPERPLASIA WITH URINARY RETENTION: Status: ACTIVE | Noted: 2024-07-31

## 2024-07-31 PROBLEM — K76.0 HEPATIC STEATOSIS: Status: ACTIVE | Noted: 2024-07-31

## 2024-07-31 PROBLEM — Z97.8 CHRONIC INDWELLING FOLEY CATHETER: Status: ACTIVE | Noted: 2024-07-31

## 2024-07-31 PROBLEM — N40.1 BENIGN PROSTATIC HYPERPLASIA WITH URINARY RETENTION: Status: ACTIVE | Noted: 2024-07-31

## 2024-07-31 PROBLEM — K80.20 CALCULUS OF GALLBLADDER WITHOUT CHOLECYSTITIS WITHOUT OBSTRUCTION: Status: ACTIVE | Noted: 2024-07-31

## 2024-08-01 ENCOUNTER — TELEPHONE (OUTPATIENT)
Dept: FAMILY MEDICINE | Facility: CLINIC | Age: 79
End: 2024-08-01

## 2024-08-01 NOTE — TELEPHONE ENCOUNTER
Current meds are the same as when Waylon was prescribing them.  blood pressure is controlled on current medication and cholesterol looks goal on it to.  Changing these meds to other options will cause a break in manage as we try to figure out what med would work for him.  Currently we know these medications are working for him.  I checked up-to-date and there is no concerning interactions between these 2 meds  I would encourage to continue taking them   If remains concerned I can refer to cardiologist     Encounters:   07/30/19 126/73     NPO Status: > 8 hrs    HCG (If Applicable):  negative    Anesthesia Evaluation  Patient summary reviewed and Nursing notes reviewed  Airway: Mallampati: I  TM distance: >3 FB   Neck ROM: full  Mouth opening: > = 3 FB Dental:          Pulmonary:       (-) COPD, asthma, sleep apnea and not a current smoker                           Cardiovascular:  Exercise tolerance: good (>4 METS),       (-) hypertension,  angina and  MURILLO                Neuro/Psych:      (-) seizures, TIA and CVA            ROS comment: See HPI GI/Hepatic/Renal:        (-) GERD, liver disease and no renal disease       Endo/Other:        (-) diabetes mellitus, hypothyroidism               Abdominal:           Vascular:     - DVT and PE. Anesthesia Plan      general     ASA 1       Induction: intravenous. MIPS: Prophylactic antiemetics administered. Anesthetic plan and risks discussed with patient. Plan discussed with CRNA.           Javier Lopez MD

## 2024-08-01 NOTE — TELEPHONE ENCOUNTER
Relayed the below message and POC from PCP. Yoanna verbalizes understanding and is okay with continuing on same medications as prescribed. Yoanna states she has no other questions or concerns at this time.    ROSE MARIE NinoN, RN (she/her)  [facility] RN

## 2024-08-01 NOTE — TELEPHONE ENCOUNTER
Dr. Jones --  Please review and advise: drug interaction.     Yoanna ([facility]) called regarding concerns of drug interactions between Verapamil and Rosuvastatin. Yoanna states that Waylon Arizmendi did not want patient taking these medications together.     Ok to LDM.     Thank you,   Sophie Burgess, ROSE MARIEN RN  [facility]

## 2024-08-05 ENCOUNTER — TELEPHONE (OUTPATIENT)
Dept: NEPHROLOGY | Facility: CLINIC | Age: 79
End: 2024-08-05

## 2024-08-05 DIAGNOSIS — N18.2 CHRONIC KIDNEY DISEASE, STAGE 2 (MILD): ICD-10-CM

## 2024-08-05 DIAGNOSIS — E11.9 TYPE 2 DIABETES, HBA1C GOAL < 7% (H): Primary | ICD-10-CM

## 2024-08-05 NOTE — TELEPHONE ENCOUNTER
Patient Contacted for the patient to call back and schedule the following:    Appointment type: New Nephrology  Provider: Dr. Trujillo  Return date: 11/13/2024 @ 8AM  Specialty phone number: [phone]  Additional appointment(s) needed: Labs Prior  Additonal Notes:esched 1/16/25 appt to 11/13/24 @ 8AM, gave pt incorrect appt info when contacted, please correct*

## 2024-08-05 NOTE — TELEPHONE ENCOUNTER
Update from  CLAUDIA moran to schedule appt.  Pre-CKD labs ordered per standing protocol.  Will fax orders once co-signed by provider.    Deborah Carter RN, BSN, PHN  [facility] Nephrology Nurse Navigator  [phone]

## 2024-08-05 NOTE — TELEPHONE ENCOUNTER
Called and spoke to spouse- who is requesting appt from 12-2pm at the latest.  Patient PCP is [facility] but in reviewing Nephrology providers for both systems neither have clinics that patient feels safe to go to in [place] [facility].   Rescheduled to Bambi moran with [facility]

## 2024-08-05 NOTE — TELEPHONE ENCOUNTER
Please reschedule to Gen Nephrology- Dr. Trujillo's New Neph on 11/13/24 0800.  Deborah Carter RN, BSN, PHN  [facility] Nephrology Nurse Navigator  [phone]

## 2024-08-26 ENCOUNTER — TELEPHONE (OUTPATIENT)
Dept: NEPHROLOGY | Facility: CLINIC | Age: 79
End: 2024-08-26

## 2024-08-26 NOTE — TELEPHONE ENCOUNTER
Reached out to patient and S.O. picked upYoanna.  Consent to Communicate is on file.  Patient is leaving for a meeting. Unavailable to make today or tomorrow appt availability.  Updated that Telephone consult appt is cancelled as required inperson at this time.  They are leaving for a meeting so they will call back to reschedule.  Future Nephrology appt cancelled and referring provider updated. Patient instructed to return call to schedule when they have availability.    Deborah Catrer RN, BSN, PHN  [facility] Ne[facility]rology Nurse Navigator  [phone]

## 2024-08-27 ENCOUNTER — TELEPHONE (OUTPATIENT)
Dept: NEPHROLOGY | Facility: CLINIC | Age: 79
End: 2024-08-27

## 2024-08-27 NOTE — TELEPHONE ENCOUNTER
Reached out to offer patient new virtual slot today.  S.O. Yoanna answered patient phone.  Consent to communicate on file.  Patient not able to make today's virtual appt.  Set for Inperson for Nov.  S.O. to return call if appt is too early in the day, patiet is aware of limited resources and consults out several months.  Verbalized understanding that pre-clinic labs need to be done up to 7 days before the appt.  Plan: Yoanna to return call if scheduled appt does not work and/or needs help to schedule lab appt before upcoming Nov consult.  Deborah Carter RN, BSN, PHN  [facility] Nephrology Nurse Navigator  [phone]

## 2024-10-01 DIAGNOSIS — I10 HYPERTENSION GOAL BP (BLOOD PRESSURE) < 140/90: ICD-10-CM

## 2024-10-01 RX ORDER — VERAPAMIL HYDROCHLORIDE 120 MG/1
120 CAPSULE, EXTENDED RELEASE ORAL AT BEDTIME
Qty: 30 CAPSULE | Refills: 0 | Status: SHIPPED | OUTPATIENT
Start: 2024-10-01

## 2024-10-04 ENCOUNTER — TELEPHONE (OUTPATIENT)
Dept: NEPHROLOGY | Facility: CLINIC | Age: 79
End: 2024-10-04
Payer: COMMERCIAL

## 2024-10-04 NOTE — TELEPHONE ENCOUNTER
Patient Contacted for the patient to call back and schedule the following:    Appointment type: Return Nephrology  Provider: Bambi Lagunas  Return date: Next avail  Specialty phone number: [phone]  Additional appointment(s) needed: Labs prior  Additonal Notes: 11/21 reschedule

## 2024-11-03 DIAGNOSIS — I10 HYPERTENSION GOAL BP (BLOOD PRESSURE) < 140/90: ICD-10-CM

## 2024-11-04 ENCOUNTER — OFFICE VISIT (OUTPATIENT)
Dept: FAMILY MEDICINE | Facility: CLINIC | Age: 79
End: 2024-11-04
Payer: COMMERCIAL

## 2024-11-04 ENCOUNTER — TELEPHONE (OUTPATIENT)
Dept: FAMILY MEDICINE | Facility: CLINIC | Age: 79
End: 2024-11-04

## 2024-11-04 VITALS
OXYGEN SATURATION: 100 % | SYSTOLIC BLOOD PRESSURE: 130 MMHG | HEART RATE: 99 BPM | HEIGHT: 72 IN | RESPIRATION RATE: 16 BRPM | TEMPERATURE: 97.5 F | BODY MASS INDEX: 26.09 KG/M2 | WEIGHT: 192.6 LBS | DIASTOLIC BLOOD PRESSURE: 80 MMHG

## 2024-11-04 DIAGNOSIS — K76.0 HEPATIC STEATOSIS: ICD-10-CM

## 2024-11-04 DIAGNOSIS — Z23 NEED FOR IMMUNIZATION AGAINST INFLUENZA: ICD-10-CM

## 2024-11-04 DIAGNOSIS — K80.20 CALCULUS OF GALLBLADDER WITHOUT CHOLECYSTITIS WITHOUT OBSTRUCTION: ICD-10-CM

## 2024-11-04 DIAGNOSIS — E04.2 MULTINODULAR GOITER: ICD-10-CM

## 2024-11-04 DIAGNOSIS — E11.9 TYPE 2 DIABETES, HBA1C GOAL < 7% (H): ICD-10-CM

## 2024-11-04 DIAGNOSIS — R00.0 SINUS TACHYCARDIA: ICD-10-CM

## 2024-11-04 DIAGNOSIS — E78.5 HYPERLIPIDEMIA WITH TARGET LDL LESS THAN 100: ICD-10-CM

## 2024-11-04 DIAGNOSIS — I10 HYPERTENSION GOAL BP (BLOOD PRESSURE) < 140/90: ICD-10-CM

## 2024-11-04 DIAGNOSIS — Z00.00 HEALTH CARE MAINTENANCE: ICD-10-CM

## 2024-11-04 DIAGNOSIS — Z97.8 CHRONIC INDWELLING FOLEY CATHETER: ICD-10-CM

## 2024-11-04 DIAGNOSIS — N40.1 BENIGN PROSTATIC HYPERPLASIA WITH URINARY RETENTION: ICD-10-CM

## 2024-11-04 DIAGNOSIS — N18.2 CHRONIC KIDNEY DISEASE, STAGE 2 (MILD): ICD-10-CM

## 2024-11-04 DIAGNOSIS — E11.9 TYPE 2 DIABETES, HBA1C GOAL < 7% (H): Primary | ICD-10-CM

## 2024-11-04 DIAGNOSIS — R80.9 MICROALBUMINURIA DUE TO TYPE 2 DIABETES MELLITUS (H): ICD-10-CM

## 2024-11-04 DIAGNOSIS — E11.29 MICROALBUMINURIA DUE TO TYPE 2 DIABETES MELLITUS (H): ICD-10-CM

## 2024-11-04 DIAGNOSIS — Z23 NEED FOR COVID-19 VACCINE: ICD-10-CM

## 2024-11-04 DIAGNOSIS — Z23 NEED FOR SHINGLES VACCINE: ICD-10-CM

## 2024-11-04 DIAGNOSIS — Z29.11 NEED FOR VACCINATION AGAINST RESPIRATORY SYNCYTIAL VIRUS: ICD-10-CM

## 2024-11-04 DIAGNOSIS — R33.8 BENIGN PROSTATIC HYPERPLASIA WITH URINARY RETENTION: ICD-10-CM

## 2024-11-04 DIAGNOSIS — K05.10 CHRONIC GINGIVITIS: ICD-10-CM

## 2024-11-04 PROBLEM — D63.1 ANEMIA DUE TO STAGE 3A CHRONIC KIDNEY DISEASE (H): Status: RESOLVED | Noted: 2024-11-04 | Resolved: 2024-11-04

## 2024-11-04 PROBLEM — N18.31 ANEMIA DUE TO STAGE 3A CHRONIC KIDNEY DISEASE (H): Status: RESOLVED | Noted: 2024-11-04 | Resolved: 2024-11-04

## 2024-11-04 PROBLEM — N18.31 ANEMIA DUE TO STAGE 3A CHRONIC KIDNEY DISEASE (H): Status: ACTIVE | Noted: 2024-11-04

## 2024-11-04 PROBLEM — D63.1 ANEMIA DUE TO STAGE 3A CHRONIC KIDNEY DISEASE (H): Status: ACTIVE | Noted: 2024-11-04

## 2024-11-04 LAB
EST. AVERAGE GLUCOSE BLD GHB EST-MCNC: 157 MG/DL
HBA1C MFR BLD: 7.1 % (ref 0–5.6)

## 2024-11-04 PROCEDURE — 99417 PROLNG OP E/M EACH 15 MIN: CPT | Performed by: FAMILY MEDICINE

## 2024-11-04 PROCEDURE — 36415 COLL VENOUS BLD VENIPUNCTURE: CPT | Performed by: FAMILY MEDICINE

## 2024-11-04 PROCEDURE — 91320 SARSCV2 VAC 30MCG TRS-SUC IM: CPT | Performed by: FAMILY MEDICINE

## 2024-11-04 PROCEDURE — 80053 COMPREHEN METABOLIC PANEL: CPT | Performed by: FAMILY MEDICINE

## 2024-11-04 PROCEDURE — 90480 ADMN SARSCOV2 VAC 1/ONLY CMP: CPT | Performed by: FAMILY MEDICINE

## 2024-11-04 PROCEDURE — 90662 IIV NO PRSV INCREASED AG IM: CPT | Performed by: FAMILY MEDICINE

## 2024-11-04 PROCEDURE — 82043 UR ALBUMIN QUANTITATIVE: CPT | Performed by: FAMILY MEDICINE

## 2024-11-04 PROCEDURE — 83036 HEMOGLOBIN GLYCOSYLATED A1C: CPT | Performed by: FAMILY MEDICINE

## 2024-11-04 PROCEDURE — 82570 ASSAY OF URINE CREATININE: CPT | Performed by: FAMILY MEDICINE

## 2024-11-04 PROCEDURE — 90471 IMMUNIZATION ADMIN: CPT | Performed by: FAMILY MEDICINE

## 2024-11-04 PROCEDURE — 99215 OFFICE O/P EST HI 40 MIN: CPT | Mod: 25 | Performed by: FAMILY MEDICINE

## 2024-11-04 RX ORDER — VERAPAMIL HYDROCHLORIDE 40 MG/1
TABLET ORAL
Qty: 90 TABLET | Refills: 0 | Status: SHIPPED | OUTPATIENT
Start: 2024-11-04

## 2024-11-04 RX ORDER — CARVEDILOL 12.5 MG/1
12.5 TABLET ORAL 2 TIMES DAILY WITH MEALS
Qty: 180 TABLET | Refills: 3 | Status: SHIPPED | OUTPATIENT
Start: 2024-11-04

## 2024-11-04 RX ORDER — VERAPAMIL HYDROCHLORIDE 120 MG/1
120 CAPSULE, EXTENDED RELEASE ORAL AT BEDTIME
Qty: 30 CAPSULE | Refills: 0 | Status: SHIPPED | OUTPATIENT
Start: 2024-11-04

## 2024-11-04 RX ORDER — ROSUVASTATIN CALCIUM 20 MG/1
20 TABLET, COATED ORAL DAILY
Qty: 90 TABLET | Refills: 1 | Status: SHIPPED | OUTPATIENT
Start: 2024-11-04

## 2024-11-04 RX ORDER — SEMAGLUTIDE 0.68 MG/ML
INJECTION, SOLUTION SUBCUTANEOUS
Qty: 3 ML | Refills: 2 | OUTPATIENT
Start: 2024-11-04

## 2024-11-04 RX ORDER — SEMAGLUTIDE 0.68 MG/ML
0.75 INJECTION, SOLUTION SUBCUTANEOUS
Qty: 3 ML | Refills: 3 | Status: SHIPPED | OUTPATIENT
Start: 2024-11-04 | End: 2024-11-06 | Stop reason: DRUGHIGH

## 2024-11-04 ASSESSMENT — PAIN SCALES - GENERAL: PAINLEVEL_OUTOF10: NO PAIN (0)

## 2024-11-04 NOTE — PROGRESS NOTES
The below note was dictated using voice recognition. Although reviewed after completion, some word and grammatical error may remain .       Assessment & Plan     Type 2 diabetes, Hb A1c goal < 7% (H)  Diabetes currently improved with hemoglobin A1c 7.1 on metformin 1000 mg bid( GFR > 35),  Jardiance 25 mg  and ozempic 0.5 wkly, asa & statin. Reports fs bid 180 to 200.  CGM was not covered. DM eye check apparently due in February. Reports hx of early cataract. Foot exam done in July was normal.  Weight is down about 10 pounds since last seen but sugars still elevated and agreeable to increasing Ozempic to 0.75 mg weekly and rechecking in 3 months    History of microalbuminuria suspected due to diabetes and blood pressure. On Jardiance 25 mg.  Knows to avoid anti-inflammatories and stay hydrated.  To see nephrology in December.     CKD 3, resolved reported anemia due to CKD, Drinks 6 to 8 glasses of water a day. GFR 58% & trending down  & referred to nephrology CBC in July was normal.  Has appointment to see nephrologist in December.     HTN  stable on verapamil 120 + 1/2 of 40 mg  & coreg 12.5 mg bid. Has no known hx of Afib or a flutter, just sinus tachycardia, no history of heart failure and no leg swelling is not on a diuretic.  CT abdomen done in 2024 showed interval decrease in prior right pleural effusion that was quite small.  Initially blood pressure 140 x 80 to recheck 130 x 80.  As expected to loose more weight, deferred changing this unconventional blood pressure management for now    HLD on Crestor 20 mg.  In July LDL was goal HDL was low, CT head in summer 2024 showed chronic ischemic small vessel disease that was not new.  Continued on same dosing and encourage increased physical activity.      Hepatic steatosis.  Liver function test normal, on a statin.  Working on weight loss with Ozempic.    Sinus tachycardia pulse ranges 99 205 currently in the 90's.  Feels well asymptomatic and prior testing showed  normal CBC, B12, ferritin, TSH.  Is on verapamil and Coreg.     Hx of multinodular goiter, reports had one nodule removed in past that was benign. No obstructive symptoms.  Thyroid function was normal this year.    Asymptomatic numerous tiny stones in the gallbladder with no biliary dilatation noted incidentally on CT abdomen in July 2024.  Asymptomatic mild distal esophageal wall thickening unchanged noted on CT abdomen done in 2024.    Hx of BPH with urinary retention & a chronic indwelling catheter changed monthly & wears a leg bag. Was recent in [facility] ER on 7/24 for plugged trejo.  CT abdomen done for fever and altered mental status in the summer of 2024 showed bladder was thick-walled but decompressed containing a Trejo catheter.  Kidneys looked normal.  Had no retroperitoneal adenopathy.  Had significant prostatic hypertrophy no free fluid or acute bony abnormalities.  Had hx of MRSA UTI in 2022.  Under care of urology at [facility]. On 9/19 /24 seen by urologist at [facility] and was to continue with urinary catheterization Flomax discontinued PSA 5.9 noted normal for age secondary to prostatic enlargement and advise recheck ultrasound and BMP yearly with urology and after 5 years of catheterization to consider annual cystoscopy.  Was to continue with monthly cath changes.  Last cath changed 10/18/2024.  Wears a leg bag.    Hx of chronic gingivitis and  to see his dentist  next week    Resolved episode of febrile altered mental status some of 2024.  Has a small cataract.  Reports no falls and still driving reporting had his license renewed June 2024.  Desiring DMV form filled for disability parking.  I have only seen him twice but he has order of stale urine every time he is seen.  Is repetitive as visits unknown if due to anxiety or due to an underlying cognitive issue.  CT head does show chronic small vessel ischemic disease.  Wife apparently has no concerns.  Difficult to assess cognitive  status at this point.  Is counseled to only drive on well lit roads not highways during daytime in familiar locations only.  And to assess safe driving every year.  DMV form filled for 1 year and original given to patient to take to the DMV office copy put in epic and will reassess yearly.    Given high dose flu and COVID shot today      Discussed to get the RSV and shingles vaccine at the pharmacy    Return in 3 months for blood pressure and diabetes check.  - Comprehensive metabolic panel; Future  - Albumin Random Urine Quantitative with Creat Ratio; Future  - Hemoglobin A1c; Future  - Comprehensive metabolic panel  - Albumin Random Urine Quantitative with Creat Ratio  - Hemoglobin A1c  - metFORMIN (GLUCOPHAGE) 1000 MG tablet; TAKE ONE TABLET BY MOUTH TWICE A DAY WITH MEALS  - semaglutide (OZEMPIC, 0.25 OR 0.5 MG/DOSE,) 2 MG/3ML pen; Inject 0.75 mg subcutaneously every 7 days.  - empagliflozin (JARDIANCE) 25 MG TABS tablet; Take 1 tablet (25 mg) by mouth daily.    Microalbuminuria due to type 2 diabetes mellitus (H)  History of microalbuminuria suspected due to diabetes and blood pressure. On Jardiance 25 mg.  Knows to avoid anti-inflammatories and stay hydrated.  To see nephrology in December.   - empagliflozin (JARDIANCE) 25 MG TABS tablet; Take 1 tablet (25 mg) by mouth daily.    Chronic kidney disease, stage 2 (mild)  CKD 3, resolved reported anemia due to CKD, Drinks 6 to 8 glasses of water a day. GFR 58% & trending down  & referred to nephrology CBC in July was normal.  Has appointment to see nephrologist in December.  - empagliflozin (JARDIANCE) 25 MG TABS tablet; Take 1 tablet (25 mg) by mouth daily.    Hypertension goal BP (blood pressure) < 140/90  HTN  stable on verapamil 120 + 1/2 of 40 mg  & coreg 12.5 mg bid. Has no known hx of Afib or a flutter, just sinus tachycardia, no history of heart failure and no leg swelling is not on a diuretic.  CT abdomen done in 2024 showed interval decrease in prior  right pleural effusion that was quite small.  Initially blood pressure 140 x 80 to recheck 130 x 80.  As expected to loose more weight, deferred changing this unconventional blood pressure management for now  - carvedilol (COREG) 12.5 MG tablet; Take 1 tablet (12.5 mg) by mouth 2 times daily (with meals).  - verapamil ER (VERELAN) 120 MG 24 hr capsule; Take 1 capsule (120 mg) by mouth at bedtime.  - verapamil (CALAN) 40 MG tablet; Take 20 mg (half -tablet) every morning with Verapamil  mg for total of 140 mg daily    Hyperlipidemia with target LDL less than 100  HLD on Crestor 20 mg.  In July LDL was goal HDL was low, CT head in summer 2024 showed chronic ischemic small vessel disease that was not new.  Continued on same dosing and encourage increased physical activity.    - rosuvastatin (CRESTOR) 20 MG tablet; Take 1 tablet (20 mg) by mouth daily.    Hepatic steatosis  Hepatic steatosis.  Liver function test normal, on a statin.  Working on weight loss with Ozempic.    Sinus tachycardia  Sinus tachycardia pulse ranges 99 205 currently in the 90's.  Feels well asymptomatic and prior testing showed normal CBC, B12, ferritin, TSH.  Is on verapamil and Coreg.    Multinodular goiter  Hx of multinodular goiter, reports had one nodule removed in past that was benign. No obstructive symptoms.  Thyroid function was normal this year.    Calculus of gallbladder without cholecystitis without obstruction  Asymptomatic numerous tiny stones in the gallbladder with no biliary dilatation noted incidentally on CT abdomen in July 2024.  Asymptomatic mild distal esophageal wall thickening unchanged noted on CT abdomen done in 2024.    Benign prostatic hyperplasia with urinary retention  Chronic indwelling Trejo catheter  Hx of BPH with urinary retention & a chronic indwelling catheter changed monthly & wears a leg bag. Was recent in [facility] ER on 7/24 for plugged trejo.  CT abdomen done for fever and altered mental  status in the summer of 2024 showed bladder was thick-walled but decompressed containing a Mathews catheter.  Kidneys looked normal.  Had no retroperitoneal adenopathy.  Had significant prostatic hypertrophy no free fluid or acute bony abnormalities.  Had hx of MRSA UTI in 2022.  Under care of urology at [facility]. On 9/19 /24 seen by urologist at [facility] and was to continue with urinary catheterization Flomax discontinued PSA 5.9 noted normal for age secondary to prostatic enlargement and advise recheck ultrasound and BMP yearly with urology and after 5 years of catheterization to consider annual cystoscopy.  Was to continue with monthly cath changes.  Last cath changed 10/18/2024.  Wears a leg bag.    Chronic gingivitis  Hx of chronic gingivitis and  to see his dentist  next week    Health care maintenance  Resolved episode of febrile altered mental status some of 2024.  Has a small cataract.  Reports no falls and still driving reporting had his license renewed June 2024.  Desiring DMV form filled for disability parking.  I have only seen him twice but he has order of stale urine every time he is seen.  Is repetitive as visits unknown if due to anxiety or due to an underlying cognitive issue.  CT head does show chronic small vessel ischemic disease.  Wife apparently has no concerns.  Difficult to assess cognitive status at this point.  Is counseled to only drive on well lit roads not highways during daytime in familiar locations only.  And to assess safe driving every year.  DMV form filled for 1 year and original given to patient to take to the DMV office copy put in epic and will reassess yearly.  Given high dose flu and COVID shot today   Discussed to get the RSV and shingles vaccine at the pharmacy  Return in 3 months for blood pressure and diabetes check.  - REVIEW OF HEALTH MAINTENANCE PROTOCOL ORDERS    Need for immunization against influenza  - INFLUENZA HIGH DOSE, TRIVALENT, PF (FLUZONE)    Need  for COVID-19 vaccine  - COVID-19 12+ (PFIZER)    Need for shingles vaccine  - RSV vaccine, bivalent, ABRYSVO, injection; Inject 0.5 mLs into the muscle once for 1 dose. Pharmacist administered    Need for vaccination against respiratory syncytial virus  - zoster vaccine recombinant adjuvanted (SHINGRIX) injection; Inject 0.5 mLs into the muscle once for 1 dose. Pharmacist administered    Blood sugar testing frequency justification:  Uncontrolled diabetes, Adjustment of medication(s), Risk of hypoglycemia with medication(s), and Patient modifying lifestyle changes (diet, exercise) with blood sugars    Work on weight loss  Regular exercise  See Patient Instructions  Review of the result(s) of each unique test - epic, careeverywhere  Diagnosis or treatment significantly limited by social determinants of health - social cognitive  Ordering of each unique test  Prescription drug management  66 minutes spent by me on the date of the encounter doing chart review, history and exam, documentation and further activities per the note  The longitudinal plan of care for the diagnosis(es)/condition(s) as documented were addressed during this visit. Due to the added complexity in care, I will continue to support David in the subsequent management and with ongoing continuity of care.        Genia Hernandez is a 79 year old, presenting for the following health issues:  Diabetes        11/4/2024    12:54 PM   Additional Questions   Roomed by Radha   Accompanied by wife     History of Present Illness       Diabetes:   He presents for follow up of diabetes.  He is checking home blood glucose two times daily.   He checks blood glucose before and after meals.  Blood glucose is never over 200 and never under 70.  When his blood glucose is low, the patient is asymptomatic for confusion, blurred vision, lethargy and reports not feeling dizzy, shaky, or weak.   He has no concerns regarding his diabetes at this time.   He  is not experiencing numbness or burning in feet, excessive thirst, blurry vision, weight changes or redness, sores or blisters on feet.           Hypertension: He presents for follow up of hypertension.  He does check blood pressure  regularly outside of the clinic. Outpatient blood pressures have not been over 140/90. He follows a low salt diet.     He eats 2-3 servings of fruits and vegetables daily.He consumes 0 sweetened beverage(s) daily.He exercises with enough effort to increase his heart rate 20 to 29 minutes per day.  He exercises with enough effort to increase his heart rate 4 days per week.   He is taking medications regularly.     CURRENTLY  Diabetes currently improved with hemoglobin A1c 7.1 on metformin 1000 mg bid( GFR > 35),  Jardiance 25 mg  and ozempic 0.5 wkly, asa & statin. Reports fs bid 180 to 200.  CGM was not covered. DM eye check apparently due in February. Reports hx of early cataract. Foot exam done in July was normal.  Weight is down about 10 pounds since last seen but sugars still elevated and agreeable to increasing Ozempic to 0.75 mg weekly and rechecking in 3 months    History of microalbuminuria suspected due to diabetes and blood pressure. On Jardiance 25 mg.  Knows to avoid anti-inflammatories and stay hydrated.  To see nephrology in December.     CKD 3, resolved reported anemia due to CKD, Drinks 6 to 8 glasses of water a day. GFR 58% & trending down  & referred to nephrology CBC in July was normal.  Has appointment to see nephrologist in December.     HTN  stable on verapamil 120 + 1/2 of 40 mg  & coreg 12.5 mg bid. Has no known hx of Afib or a flutter, just sinus tachycardia, no history of heart failure and no leg swelling is not on a diuretic.  CT abdomen done in 2024 showed interval decrease in prior right pleural effusion that was quite small.  Initially blood pressure 140 x 80 to recheck 130 x 80.  As will lose more weight deferred changing this unconventional blood pressure  management for now    HLD on Crestor 20 mg.  In July LDL was goal HDL was low, CT head in summer 2024 showed chronic ischemic small vessel disease that was not new.  Continued on same dosing and encourage increase physical activity.      Hepatic steatosis.  Liver function test normal, on a statin.  Working on weight loss with Ozempic.    Sinus tachycardia pulse ranges 99 205 currently in the 90's.  Feels well asymptomatic and prior testing showed normal CBC, B12, ferritin, TSH.  Is on verapamil and Coreg.     Hx of multinodular goiter, reports had one nodule removed in past that was benign. No obstructive symptoms.  Thyroid function was normal this year.    Asymptomatic numerous tiny stones in the gallbladder with no biliary dilatation noted incidentally on CT abdomen in July 2024.  Asymptomatic mild distal esophageal wall thickening unchanged noted on CT abdomen done in 2024.    Hx of BPH with urinary retention & a chronic indwelling catheter changed monthly & wears a leg bag. Was recent in [facility] ER on 7/24 for plugged trejo.  CT abdomen done for fever and altered mental status in the summer of 2024 showed bladder was thick-walled but decompressed containing a Trejo catheter.  Kidneys looked normal.  Had no retroperitoneal adenopathy.  Had significant prostatic hypertrophy no free fluid or acute bony abnormalities.  Had hx of MRSA UTI in 2022.  Under care of urology at [facility]. On 9/19 /24 seen by urologist at [facility] and was to continue with urinary catheterization Flomax discontinued PSA 5.9 noted normal for age secondary to prostatic enlargement and advise recheck ultrasound and BMP yearly with urology and after 5 years of catheterization to consider annual cystoscopy.  Was to continue with monthly cath changes.  Last cath changed 10/18/2024.  Wears a leg bag.    Hx of chronic gingivitis and  to see his dentist  next week    Resolved episode of febrile altered mental status some of 2024.   Has a small cataract.  Reports no falls and still driving reporting had his license renewed June 2024.  Desiring DMV form filled for disability parking.  Counseled to only drive on well lit roads not highways during daytime in familiar locations only.  And to assess safe driving every year     Will do high dose flu and COVID shot today      Discussed to get the RSV and shingles vaccine at the pharmacy    BACKGROUND  79-year-old gentleman with history of uncontrolled diabetes, currently  improved on Jardiance, Ozempic 0.5 and metformin, hyperlipidemia on Crestor, hypertension previously on lisinopril now on verapamil and Coreg, hx of sinus tachycardia bit does not have history of A-fib or flutter no history of ischemic heart disease or MI, pulse in the 100's, has hx of microalbuminuria, chronic kidney disease stage II reported history of anemia due to chronic kidney disease, unclear if has seen nephrology in the past, hx of BI 2022, no anemia noted on recent labs, multinodular goiter, reported had a nodule removed in 2003 was benign,  hx of urinary retention has a chronic indwelling Mathews catheter, with leg bag, history of possible BPH under care of urology at [facility] has appointment with them, hx of prior MRSA positive UTI, and noted was seen in ER 7/25 for malfunctioning Mathews, unclear why also on Flomax, hxof circumcision age 10,  asymptomatic gallstones noted on ct, hx of chronic gingivitis, family history of GI cancer noted in epic but patient denies that his dad had a hx of colorectal cancer so not sure how accurate the information is, hx of fall and head injury 10/15/23 when seen at health [facility] er and ct face, c spine, head showed no acute hemorrhage or fracture, mild to mod vol loss and presumed choric small vessel ischemic changes of brain, repeat ct face and head in 1/2024 showed no new findings, 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     Seen at [facility] er on 7/18/24 brought in by EMS for weakness, confusion, and vomiting. Noted had been well. Had been to [employer] earlier& later wife reported he became newly confused and had difficulty driving. He was apparently pulled over by the police, denied alcohol use, and was followed home for his safety. Apparently was too weak to ambulate when returning home prompting EMS call. He was febrile for EMS at 100.7 orally. EMS fingerstick 215. EMS BPs 180's systolic. No interventions performed by EMS. His trejo had been changed 3 days prior ( changed monthly). Vital signs on arrival with hypertension, fever, tachycardia.he appeared encephalopathic with confusion to date, recent events, and current president. He appeared globally weak and had difficulty with performing tasks and participating with neurological exam. He was moving all extremities. Had no obvious facial droop or pronator drift. No nuchal rigidity. Trejo was changed out. New urine cultured. initial workup included CBC ( normal) , BMP( na 134), LFT's ( normal)  lipase ( normal) , troponin ( normal) , ethanol level ( normal) , blood cultures x2, point of care lactate, troponin, EKG, non contrast head CT, abdomen/pelvis with IV contrast, & was given Zosyn, vancomycin, Tylenol suppository, and IV fluids ordered. Lactate was elevated and given IVF bolus. Had normal lipase, troponin, EKG showed sinus tach, had no leucocytosis, undetectable alcohol, ct head unremarkable, CXR subtle density in left lung ? Atelectasis versus early pneumonia, ct abdomen showed gall stones with no cholecystis. His mental status improved though remained tachycardic.UA was diff to interpret suggested colonization, later Cx came back negative. A repeat lactate was negative. He declined admission and was sent home on  bactrim 3 days to cover possible MRSA and gram neg based on prior culture. Suspicion for pneumonia was low. He returned to [facility] er 7/24/24 for catheter malfunction and urinary retention with elevated BP. His catheter was replaced with resolution of symptoms and BP improved and was discharged home. They felt later might have been a case of food poisoning as recovered from that pretty quickly and all tests did not indicate source of fever or vomiting    Seen 7/25/24 to establish care with wife Yoanna  New to this provider. Noted Diabetes not controlled with HB A1c 9.7 in April, was on metformin 1000 mg bid( GFR > 35),  Jardiance 25 mg  and ozempic 0.25 wkly .asa & statin. Reported fs bid 184 to 210. CGM was not covered & desired order be resent. Encouraged a DM eye check. Reported hx of early cataract. Foot exam done was unremarkable. Hemoglobin A1c came back improved to 8.3 but still not at goal, Ozempic increased to 0.5 mg once a week. Kidney function slightly worse estimated GFR 58%.  Normal electrolytes and liver tests.  Continued on Jardiance metformin and Crestor. HLD on Crestor 20 mg. LDL was goal, HDL very low & encouraged to increase physical activity and continue Crestor at current dosing. HTN noted stable on verapamil 120 + 1/2 of 40 mg  & coreg 12.5 mg bid. Had no known of Afib or a flutter, just sinus tachycardia, No leg swelling. Not on a diuretic. No hx of Heart failure. Since BP under control was continued on this regimen but discussed in the future may need to make changes.to optimize care. Had a hx of microalbuminuria. Microalbumin remained significantly elevated, slightly improved from before, & continued on Jardiance 25 mg but recommended given mild worsening of kidney function to see a nephrologist to take over kidney care. Hx of CKD 3, hx of reported anemia due to CKD, Drank 6 to 8 glasses of water a came back showing no anemia. Sinus tachycardia pulse 100 to 105. appeared chronic.  Reported drinking enough water. Noted no fever & felt well. CBC showed no anemia. Vitamin B 12 was adequate. Ferritin l normal. & Thyroid function normal. To continue to monitor. Hx of multinodular goiter, reported had one nodule removed in past that was benign. Noted no obstructive symptoms. Reviewed recent hx of fever, AMS, nausea & vomiting on 7/18, seen in [facility] er, workup did not reveal UTI or pneumonia & no COVID testing done. Declined admission,improved rapidly with IVF and discharged on emperic bactrim 3 days given due to prior MRSA UTI in past. Final Cx urine and blood reported negative. Ct abdomen had shown mild distal esophageal wall thickening, unchanged. Potentially esophagitis. But reported no symptoms. Mild bibasilar atelectasis. Interval decrease in prior right pleural effusion, quite small. Numerous tiny stones in the gallbladder. No biliary dilatation. Mild steatosis.  No hydronephrosis. Bladder thick-walled but decompressed containing a Trejo catheter. Pancreas, bowels kidneys were normal  Normal caliber appendix. No bowel obstruction. No significant retroperitoneal adenopathy. No abdominal aortic aneurysm. Patent portal vein. Noted significant prostatic hypertrophy. No free fluid. No acute bony abnormalities. Ct head showed no acute process just chronic ischemic small vessel disease not new. CXR and labs were normal. Reported no falls & still driving, reported  had his license renewed in June 2024. Unclear etiology of now resolved febrile AMS episode. To continue to monitor. Noted had a hx of BPH with urinary retention & a chronic indwelling catheter changed monthly & wore a leg bag. Was recently in [facility] ER on 7/24 for plugged trejo. This was changed. He  reported seeing [facility] / urology at [facility] and had an upcoming apt with them. Of note had hx of MRSA UTI in 2022. Deferred Flomax management to his urologist since he had a chronic indwelling catheter in place  it seemed a little unusual to also be on Flomax. They were advised to contact the urologist regarding this medication. Hx of chronic gingivitis and encouraged to see his dentist regularly. Discussed RSV and shingles & could get at the pharmacy. Was to follow up in 3 months.    Nephrologist tried to give him an appointment sooner but times did not work with him and his wife and changed frequently and not till December now per patient choice.  On 8/23/2024 noted catheter changed at [facility].  On 9/19 seen by urologist at [facility] and was to continue with urinary catheterization Flomax discontinued PSA 5.9 noted normal for age secondary to prostatic enlargement and advise recheck ultrasound and BMP yearly with urology and after 5 years of catheterization to consider annual cystoscopy.  Was to continue with monthly cath changes.  Last cath change 10/18/2024.    Review of Systems  Constitutional, HEENT, cardiovascular, pulmonary, GI, , musculoskeletal, neuro, skin, endocrine and psych systems are negative, except as otherwise noted.      Objective    /80 (BP Location: Left arm, Patient Position: Sitting, Cuff Size: Adult Regular)   Pulse 99   Temp 97.5  F (36.4  C) (Temporal)   Resp 16   Ht 1.829 m (6')   Wt 87.4 kg (192 lb 9.6 oz)   SpO2 100%   BMI 26.12 kg/m    Body mass index is 26.12 kg/m .  Physical Exam   GENERAL: alert, no distress, and over weight  EYES: Eyes grossly normal to inspection, PERRL and conjunctivae and sclerae normal, glasses  HENT: ear canals and TM's normal, nose and mouth without ulcers or lesions  NECK: no adenopathy, no asymmetry, or scars, has a goiter  RESP: lungs clear to auscultation - no rales, rhonchi or wheezes  CV: regular rate and rhythm, normal S1 S2, no S3 or S4, no murmur, click or rub, no peripheral edema  ABDOMEN: soft, non tender has a leg bag attached to right lower leg  MS: no gross musculoskeletal defects noted, no edema  SKIN: no suspicious lesions  or rashes  NEURO: Normal strength and tone, mentation intact and speech normal  PSYCH: mentation appears normal, concentration poor, tangential, anxious, speech pressured, appearance disheveled, and smells of urine.  Repetitive at times,    Results for orders placed or performed in visit on 11/04/24   Hemoglobin A1c     Status: Abnormal   Result Value Ref Range    Estimated Average Glucose 157 (H) <117 mg/dL    Hemoglobin A1C 7.1 (H) 0.0 - 5.6 %     Results for orders placed or performed in visit on 11/04/24 (from the past 24 hours)   Hemoglobin A1c   Result Value Ref Range    Estimated Average Glucose 157 (H) <117 mg/dL    Hemoglobin A1C 7.1 (H) 0.0 - 5.6 %           Signed Electronically by: Aimee Jones MD

## 2024-11-04 NOTE — TELEPHONE ENCOUNTER
Yoanna (CTC on file) returning the call. States they were just in clinic so not sure why we would be calling. Writer does not see any documented outreach on file. Informed Yoanna that I suspect it was the after visit survey.     Did review the A1C results since they are back and advised that they will get a full lab result call once the rest are back.     Trinidad Hicks RN  [facility]

## 2024-11-04 NOTE — LETTER
November 4, 2024      David E Matt Claros.  [address]  [place] [address]        Dear ,    We are writing to inform you of your test results.    Results within acceptable limits.  -A1C (test of diabetes control the last 2-3 months) is improved to 7.1 Please return in 3 months to clinic when we will recheck your A1C test     Resulted Orders   Hemoglobin A1c   Result Value Ref Range    Estimated Average Glucose 157 (H) <117 mg/dL    Hemoglobin A1C 7.1 (H) 0.0 - 5.6 %      Comment:      Normal <5.7%   Prediabetes 5.7-6.4%    Diabetes 6.5% or higher     Note: Adopted from ADA consensus guidelines.       If you have any questions or concerns, please call the clinic at the number listed above.       Sincerely,      Aimee Jones MD

## 2024-11-04 NOTE — RESULT ENCOUNTER NOTE
Results within acceptable limits.  -A1C (test of diabetes control the last 2-3 months) is improved to 7.1 Please return in 3 months to clinic when we will recheck your A1C test

## 2024-11-04 NOTE — LETTER
November 5, 2024      David Gutierrez .  [address] [place] [address]        Dear ,    We are writing to inform you of your test results.    -Liver and gallbladder tests (ALT,AST, Alk phos,bilirubin) are normal.   -Kidney function (GFR) is stable   -Sodium is normal.   -Potassium is normal.   -Calcium is normal.   -Glucose is elevated due to your diabetes.   -Microalbumin (urine protein) level is elevated. This is suggestive of early damage to your kidneys from high blood pressure and diabetes.  ADVISE: avoiding anti-inflamatory agents such as ibuprofen (Advil, Motrin) or naproxen (Aleve) as much as possible, keeping your blood pressure in a normal range, and continue Jardiance and see nephrology as discussed     Resulted Orders   Comprehensive metabolic panel   Result Value Ref Range    Sodium 138 135 - 145 mmol/L    Potassium 4.3 3.4 - 5.3 mmol/L    Carbon Dioxide (CO2) 27 22 - 29 mmol/L    Anion Gap 11 7 - 15 mmol/L    Urea Nitrogen 16.7 8.0 - 23.0 mg/dL    Creatinine 1.09 0.67 - 1.17 mg/dL    GFR Estimate 70 >60 mL/min/1.73m2      Comment:      eGFR calculated using 2021 CKD-EPI equation.    Calcium 9.1 8.8 - 10.4 mg/dL      Comment:      Reference intervals for this test were updated on 7/16/2024 to reflect our healthy population more accurately. There may be differences in the flagging of prior results with similar values performed with this method. Those prior results can be interpreted in the context of the updated reference intervals.    Chloride 100 98 - 107 mmol/L    Glucose 160 (H) 70 - 99 mg/dL    Alkaline Phosphatase 82 40 - 150 U/L    AST 14 0 - 45 U/L    ALT 16 0 - 70 U/L    Protein Total 7.7 6.4 - 8.3 g/dL    Albumin 4.2 3.5 - 5.2 g/dL    Bilirubin Total 0.9 <=1.2 mg/dL   Albumin Random Urine Quantitative with Creat Ratio   Result Value Ref Range    Creatinine Urine mg/dL 43.0 mg/dL      Comment:      The reference ranges have not been established in urine creatinine. The  results should be integrated into the clinical context for interpretation.    Albumin Urine mg/L 283.0 mg/L      Comment:      The reference ranges have not been established in urine albumin. The results should be integrated into the clinical context for interpretation.    Albumin Urine mg/g Cr 658.14 (H) 0.00 - 17.00 mg/g Cr      Comment:      Microalbuminuria is defined as an albumin:creatinine ratio of 17 to 299 for males and 25 to 299 for females. A ratio of albumin:creatinine of 300 or higher is indicative of overt proteinuria.  Due to biologic variability, positive results should be confirmed by a second, first-morning random or 24-hour timed urine specimen. If there is discrepancy, a third specimen is recommended. When 2 out of 3 results are in the microalbuminuria range, this is evidence for incipient nephropathy and warrants increased efforts at glucose control, blood pressure control, and institution of therapy with an angiotensin-converting-enzyme (ACE) inhibitor (if the patient can tolerate it).     Hemoglobin A1c   Result Value Ref Range    Estimated Average Glucose 157 (H) <117 mg/dL    Hemoglobin A1C 7.1 (H) 0.0 - 5.6 %      Comment:      Normal <5.7%   Prediabetes 5.7-6.4%    Diabetes 6.5% or higher     Note: Adopted from ADA consensus guidelines.       If you have any questions or concerns, please call the clinic at the number listed above.       Sincerely,      Aimee Jones MD

## 2024-11-05 LAB
ALBUMIN SERPL BCG-MCNC: 4.2 G/DL (ref 3.5–5.2)
ALP SERPL-CCNC: 82 U/L (ref 40–150)
ALT SERPL W P-5'-P-CCNC: 16 U/L (ref 0–70)
ANION GAP SERPL CALCULATED.3IONS-SCNC: 11 MMOL/L (ref 7–15)
AST SERPL W P-5'-P-CCNC: 14 U/L (ref 0–45)
BILIRUB SERPL-MCNC: 0.9 MG/DL
BUN SERPL-MCNC: 16.7 MG/DL (ref 8–23)
CALCIUM SERPL-MCNC: 9.1 MG/DL (ref 8.8–10.4)
CHLORIDE SERPL-SCNC: 100 MMOL/L (ref 98–107)
CREAT SERPL-MCNC: 1.09 MG/DL (ref 0.67–1.17)
CREAT UR-MCNC: 43 MG/DL
EGFRCR SERPLBLD CKD-EPI 2021: 70 ML/MIN/1.73M2
GLUCOSE SERPL-MCNC: 160 MG/DL (ref 70–99)
HCO3 SERPL-SCNC: 27 MMOL/L (ref 22–29)
MICROALBUMIN UR-MCNC: 283 MG/L
MICROALBUMIN/CREAT UR: 658.14 MG/G CR (ref 0–17)
POTASSIUM SERPL-SCNC: 4.3 MMOL/L (ref 3.4–5.3)
PROT SERPL-MCNC: 7.7 G/DL (ref 6.4–8.3)
SODIUM SERPL-SCNC: 138 MMOL/L (ref 135–145)

## 2024-11-05 NOTE — RESULT ENCOUNTER NOTE
Please mail them results     -Liver and gallbladder tests (ALT,AST, Alk phos,bilirubin) are normal.  -Kidney function (GFR) is stable  -Sodium is normal.  -Potassium is normal.  -Calcium is normal.  -Glucose is elevated due to your diabetes.  -Microalbumin (urine protein) level is elevated. This is suggestive of early damage to your kidneys from high blood pressure and diabetes.  ADVISE: avoiding anti-inflamatory agents such as ibuprofen (Advil, Motrin) or naproxen (Aleve) as much as possible, keeping your blood pressure in a normal range, and continue Jardiance and see nephrology as discussed

## 2024-11-06 ENCOUNTER — TELEPHONE (OUTPATIENT)
Dept: FAMILY MEDICINE | Facility: CLINIC | Age: 79
End: 2024-11-06
Payer: COMMERCIAL

## 2024-11-06 DIAGNOSIS — E11.9 TYPE 2 DIABETES, HBA1C GOAL < 7% (H): Primary | ICD-10-CM

## 2024-11-06 RX ORDER — VERAPAMIL HYDROCHLORIDE 120 MG/1
120 CAPSULE, EXTENDED RELEASE ORAL AT BEDTIME
Qty: 30 CAPSULE | Refills: 0 | OUTPATIENT
Start: 2024-11-06

## 2024-11-06 NOTE — TELEPHONE ENCOUNTER
Partner Yoanna calling (CTC on file)  Needs medication rx updated for Ozempic - cannot fill a 0.75mg dose and need either 0.5mg or 1.0mg prescribed  Reviewed results via phone. Yoanna verbalized understanding and will follow up as advised with nephrology.    Routing to PCP for medication update.    ROSE MARIE NinoN, RN (she/her)  [facility] RN

## 2024-11-06 NOTE — TELEPHONE ENCOUNTER
Relayed updated dose to Silvia Ellison, ROSE MARIEN, RN (she/her)  [facility] RN

## 2024-11-06 NOTE — TELEPHONE ENCOUNTER
Pt was seen and rx refilled by PCP.    ROSE MARIE NinoN, RN (she/her)  [facility] RN

## 2024-11-06 NOTE — CONFIDENTIAL NOTE
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

## 2024-11-06 NOTE — TELEPHONE ENCOUNTER
Dose adjusted and new rx was sent today by PCP.  ROSE MARIE NinoN, RN (she/her)  [facility] RN

## 2024-11-14 ENCOUNTER — PATIENT OUTREACH (OUTPATIENT)
Dept: GERIATRIC MEDICINE | Facility: CLINIC | Age: 79
End: 2024-11-14
Payer: COMMERCIAL

## 2024-11-14 NOTE — PROGRESS NOTES
[facility] Care Coordination Contact    Member became effective with [facility] on 11/01/2024 with [facility] MSC+.  Previous Health Plan: [facility] MSC+  Previous Care System: [facility]  Previous care coordinators name and number: Josselin Stoneymary  Romaine Type: N/A  UTF received: No: Requested on 11/14/2024 via [employer] email messaging.  Mailed welcome letter and [facility] When to Contact Your Care Coordinator  Address/Phone discrepancy: none  [place] Choices but not prepped for visit, please task CMS if needed.    Merna Ceballos  Case Management Specialist  [facility]  [phone]

## 2024-11-14 NOTE — LETTER
November 14, 2024    ANDRADE ARANDA JR.  [address]  [place] [address]      Dear Andrade:    As a member of [facility] ([facility] you are provided a care coordinator. I will be your new care coordinator as of 11/01/2024. I will be calling you soon to see how you are doing and determine your needs.    If you have any questions, please feel free to call me at [phone]. If you reach my voice mail, please leave a message and your phone number. If you are hearing impaired, please call the [place] Relay at 476 or [phone] ([email or web address]-cm-[email or web address] relay service).    I look forward to speaking with you soon.    Sincerely,        Patricia Hayward RN, BSN, PHN    E-mail: [email or web address]  Phone: [phone]      [facility]           MSC+ [facility]  [id number] DHS Approved ([id number])  [id number] (11/18)

## 2024-11-18 ENCOUNTER — PATIENT OUTREACH (OUTPATIENT)
Dept: GERIATRIC MEDICINE | Facility: CLINIC | Age: 79
End: 2024-11-18
Payer: COMMERCIAL

## 2024-11-18 NOTE — Clinical Note
Little Jones, I am the community care coordinator through [facility] for David. He is new to our program, but he declined a home visit. All my notes can be found in Epic. Please feel free to contact me with any questions or concerns. Patricia Hayward RN, BSN, PHN [facility] Care Coordinator [phone]

## 2024-11-18 NOTE — PROGRESS NOTES
[facility] Care Coordination Contact      [facility] Refusal Telephone Assessment    Member refused home visit HRA on 11/18/24 (reason: Member doesn't want anyone coming out to his home. He reports he doesn't need anything.).    ER visits: Yes -  [facility]   Hospitalizations: No  Health concerns: None reported  Falls/Injuries: No  ADL/IADL Dependencies: Unknown, did not report anything        Member currently receiving the following home care services:   None known  Member currently receiving the following community resources:  None known  Informal support(s):      Follow-Up Plan: Member informed of future contact, plan to f/u with member with a 6 month telephone assessment and offer a home visit.  Contact information shared with member and family, encouraged member to call with any questions or concerns at any time.    This CC note routed to PCP, Aimee Jones.    Patricia Hayward, RN, BSN, PHN  [facility] Care Coordinator  [phone]

## 2024-11-26 DIAGNOSIS — N18.2 CHRONIC KIDNEY DISEASE, STAGE 2 (MILD): Primary | ICD-10-CM

## 2024-12-04 ENCOUNTER — TELEPHONE (OUTPATIENT)
Dept: NEPHROLOGY | Facility: CLINIC | Age: 79
End: 2024-12-04
Payer: COMMERCIAL

## 2024-12-04 NOTE — TELEPHONE ENCOUNTER
[facility] Call Center    Phone Message    May a detailed message be left on voicemail: no     Reason for Call: Other: pt calling needs to reschedule appt, looked and no schedule, please reach out to pt     Action Taken: Other: neph    Travel Screening: Not Applicable     Date of Service:

## 2024-12-05 ENCOUNTER — PRE VISIT (OUTPATIENT)
Dept: NEPHROLOGY | Facility: CLINIC | Age: 79
End: 2024-12-05

## 2024-12-05 NOTE — TELEPHONE ENCOUNTER
Writer called patient and spoke with Yoanna ([facility]). Yoanna reported that patient is very particular to where he is willing to go taking into consideration crime and distance and familiarity. Yoanna also stated that the last appointment, not today, was canceled by clinic.  Writer informed Yoanna:    Referral is good for a year.   Other options were [place] and [place]. Patient unwilling to go to these locations.   Contact insurance company to inquire of other nephrology locations.     Yoanna was agreeable to calling insurance company. Will let us know with FAX number.     Sophie Burgess, ROSE MARIEN RN  [facility]

## 2024-12-05 NOTE — TELEPHONE ENCOUNTER
Recommend they see a nephrologist recommended has canceled or rescheduled many times the closest location is what was given there might be others closer but not covered by the insurance.  They can arrange for overall lift to take them to appointment

## 2024-12-05 NOTE — TELEPHONE ENCOUNTER
General Call    Contacts       Contact Date/Time Type Contact Phone/Fax    12/04/2024 01:26 PM CST Phone (Incoming) David Gutierrez Jr. (Self) [phone] (H)          Reason for Call:  Significant (Chris) CTC on file called requesting another referral to a nearby nephrology closer to the [facility].  RN shared with Gal, she can ask from the referral if there is a closer location to [place] patient can go to.  Gal reported she has asked but there is none in the area other than [facility].    What are your questions or concerns:  The referral placed is too far.    Date of last appointment with provider: 11/4/2024    Okay to leave a detailed message?: Yes at Cell number on file:    Telephone Information:   Mobile [phone]

## 2024-12-06 ENCOUNTER — TELEPHONE (OUTPATIENT)
Dept: FAMILY MEDICINE | Facility: CLINIC | Age: 79
End: 2024-12-06
Payer: COMMERCIAL

## 2024-12-06 NOTE — TELEPHONE ENCOUNTER
General Call    Contacts       Contact Date/Time Type Contact Phone/Fax    12/06/2024 10:27 AM CST Phone (Incoming) BERNIEJORDEN (Emergency Contact) [phone] (H)     CTC on file          Reason for Call:  Send Nephrology referral to [facility]    What are your questions or concerns:      Jorden (CTC on file) is calling on behalf of patient requesting for [facility] Care Team to help fax the Nephrology referral to [facility] (as this is much closer to patient).    [facility]  Phone: [phone]  Fax number: [phone]    Writer informed Jorden, that writer is not physically at the  clinic but will route message to [facility] Care Team to assist in faxing over Nephrology referral to requested location. Jorden verbalizes understanding and has no further questions.    Date of last appointment with provider: 11/04/2024    Okay to leave a detailed message?: Yes at Home number on file [phone] (home)    Routing message to [facility] Pool to assist with task.    Anne Marie Arcos, BSN, RN, PHN   [facility]

## 2025-01-07 ENCOUNTER — PATIENT OUTREACH (OUTPATIENT)
Dept: GERIATRIC MEDICINE | Facility: CLINIC | Age: 80
End: 2025-01-07
Payer: MEDICARE

## 2025-01-07 NOTE — LETTER
January 7, 2025    ANDRADE ARANDA JR.  [address] [place] [address]      Dear Andrade,    Welcome to [facility] health program. My name is Patricia Hayward RN, BSN, PHN. I am your [facility] care coordinator. You're eligible for care coordination through your [facility] Product.    As your care coordinator, we ll:  Meet to go over your care coordination benefits  Talk about your physical and mental health care needs   Review your preventative care needs  Create a plan that meets your needs with the services you choose    What happens next?  I ll call you soon to introduce myself and tell you more about my role. We ll then plan time to go over your health and safety needs. Our goal is to keep you as healthy and independent as possible.    [facility] combines the benefits you may already have receive from Medical Assistance, Medicare and the Prescription Drug Coverage Program. Soon you'll receive a new member identification (ID) card from [facility]. Use this card whenever you get health services.    The [facility] care coordination program is voluntary and offered to you at no cost. If you wish to stop being in the care coordination program or have questions, call me at [phone]. If you reach my voicemail, leave a message and your phone number. TTY users, call the [place] Relay at 509 or [phone] (nlnxni-wq-sljgkg relay service).    Sincerely,    Patricia Hayward RN, BSN, PHN    E-mail: [email or web address]  Phone: [phone]      [facility]") is a health plan that contracts with both Medicare and the [place] Medical Assistance (Medicaid) program to provide benefits of both programs to enrollees. Enrollment in [facility] depends on contract renewal.    [id number] accepted   [id number]         [id number] (07/2022)

## 2025-01-07 NOTE — PROGRESS NOTES
[facility] Care Coordination Contact    Member changed health plan products from [facility] MSC+ to are [facility] effective 1/1/2025. CC to complete items on Product Change/ Health Plan Change check list including MMIS entry, as applicable. CMS mailed Welcome Letter, supporting documents, [place] and other required tasks.    Carley Mayer  Care Management Specialist  [facility]  [phone]

## 2025-01-13 ENCOUNTER — PATIENT OUTREACH (OUTPATIENT)
Dept: GERIATRIC MEDICINE | Facility: CLINIC | Age: 80
End: 2025-01-13
Payer: MEDICARE

## 2025-01-13 NOTE — PROGRESS NOTES
[facility] Care Coordination Contact      [facility] Health Plan or Product Change    CC received notification that member's health plan or health plan product changed from [facility] MSC+ to are M[facility]O effective 1/1/24.  CC contacted member and discussed change and face-to-face visit was offered. Member declined need for home visit.     Required referral authorization information communicated to CMS: No  Writer reviewed the following with member:  ER visits: No  Hospitalizations: No  TCU stays: No  Significant health status changes: None  Falls/Injuries: No  ADL/IADL changes: No  Changes in services: No    Follow-Up Plan: Member informed of future contact, plan to f/u with member with at next regularly scheduled contact.  Contact information shared with member and family, encouraged member to call with any questions or concerns.    Patricia Hayward RN, BSN, PHN  [facility] Care Coordinator  [phone]

## 2025-01-27 ENCOUNTER — OFFICE VISIT (OUTPATIENT)
Dept: CARDIOLOGY | Facility: CLINIC | Age: 80
End: 2025-01-27
Payer: COMMERCIAL

## 2025-01-27 VITALS
HEIGHT: 73 IN | SYSTOLIC BLOOD PRESSURE: 138 MMHG | DIASTOLIC BLOOD PRESSURE: 86 MMHG | HEART RATE: 96 BPM | WEIGHT: 194 LBS | RESPIRATION RATE: 16 BRPM | BODY MASS INDEX: 25.71 KG/M2

## 2025-01-27 DIAGNOSIS — N18.2 CHRONIC KIDNEY DISEASE, STAGE 2 (MILD): ICD-10-CM

## 2025-01-27 DIAGNOSIS — R00.0 TACHYCARDIA: ICD-10-CM

## 2025-01-27 DIAGNOSIS — I10 HYPERTENSION GOAL BP (BLOOD PRESSURE) < 140/90: Primary | ICD-10-CM

## 2025-01-27 DIAGNOSIS — E11.9 TYPE 2 DIABETES, HBA1C GOAL < 7% (H): ICD-10-CM

## 2025-01-27 PROCEDURE — G2211 COMPLEX E/M VISIT ADD ON: HCPCS | Performed by: STUDENT IN AN ORGANIZED HEALTH CARE EDUCATION/TRAINING PROGRAM

## 2025-01-27 PROCEDURE — 99204 OFFICE O/P NEW MOD 45 MIN: CPT | Performed by: STUDENT IN AN ORGANIZED HEALTH CARE EDUCATION/TRAINING PROGRAM

## 2025-01-27 RX ORDER — TELMISARTAN 40 MG/1
40 TABLET ORAL DAILY
Qty: 90 TABLET | Refills: 3 | Status: SHIPPED | OUTPATIENT
Start: 2025-01-27

## 2025-01-27 NOTE — LETTER
1/27/2025    Aimee Jones MD  [address]  [place] [address]    RE: David Gutierrez Jr.       Dear Colleague,     I had the pleasure of seeing David Gutierrez Jr. in the [facility].    [facility] HEART CARE   [address]  [place] [address]   [email or web address]   OFFICE: [phone]     CARDIOLOGY CLINIC NOTE     Thank you, Aimee Head, for asking the [facility] Heart Care team to see Mr. David Gutierrez Jr. to evaluate New Patient        History of Present Illness   Mr. David Gutierrez Jr. is a 79 year old male with a significant past history of HTN, DM2,who presents for evaluation of his HTN.  The patient notes he has been doing well overall.  He denies any chest symptoms, no exertional dyspnea, no lightheadedness or syncope.  He takes verapamil and carvedilol for BP.  He would very much like to be only on one antihypertensive.  It seems his regimen was changed at one point in time as he used to be on lisinopril which was swapped for verapamil for reasons that are unclear.  This was by his previous PCP.  Carvedilol was also started along the way.  He is known to have a higher resting heart rate but he has no symptoms related to this.         Medications  Allergies   Current Outpatient Medications   Medication Sig Dispense Refill     alcohol swab prep pads Use to swab area of injection/checo as directed. 100 each 3     aspirin (ASA) 81 MG tablet Take 1 tablet (81 mg) by mouth daily 90 tablet 3     blood glucose (NO BRAND SPECIFIED) test strip Use to test blood sugar 2 times daily before breakfast and at dinner. Preferred blood glucose meter OR supplies to accompany: Blood Glucose Monitor Brands: per insurance. 100 strip 6     blood glucose calibration (NO BRAND SPECIFIED) solution To accompany: Blood Glucose Monitor Brands: per insurance. 1 each 1     blood glucose monitoring (NO BRAND SPECIFIED) meter device kit Use to  "test blood sugar 2 times daily before breakfast and at dinner. Preferred blood glucose meter OR supplies to accompany: Blood Glucose Monitor Brands: per insurance. 1 kit 0     carvedilol (COREG) 12.5 MG tablet Take 1 tablet (12.5 mg) by mouth 2 times daily (with meals). 180 tablet 3     Continuous Glucose Sensor (FREESTYLE HARPAL 2 SENSOR) MISC 1 each every 14 days Use 1 sensor every 14 days. Use to read blood sugars per 's instructions. (Patient not taking: Reported on 11/4/2024) 2 each 5     empagliflozin (JARDIANCE) 25 MG TABS tablet Take 1 tablet (25 mg) by mouth daily. 90 tablet 1     metFORMIN (GLUCOPHAGE) 1000 MG tablet TAKE ONE TABLET BY MOUTH TWICE A DAY WITH MEALS 180 tablet 1     rosuvastatin (CRESTOR) 20 MG tablet Take 1 tablet (20 mg) by mouth daily. 90 tablet 1     Semaglutide, 1 MG/DOSE, (OZEMPIC) 4 MG/3ML pen Inject 1 mg subcutaneously every 7 days. 9 mL 1     thin (NO BRAND SPECIFIED) lancets Use with lanceting device. To accompany: Blood Glucose Monitor Brands: per insurance. 100 each 6     verapamil (CALAN) 40 MG tablet Take 20 mg (half -tablet) every morning with Verapamil  mg for total of 140 mg daily 90 tablet 0     verapamil ER (VERELAN) 120 MG 24 hr capsule TAKE ONE CAPSULE BY MOUTH EVERY NIGHT AT BEDTIME 90 capsule 0      Allergies   Allergen Reactions     No Known Drug Allergy         Physical Examination Review of Systems   Vitals: Ht 1.854 m (6' 1\")   Wt 88 kg (194 lb)   BMI 25.60 kg/m    BMI= Body mass index is 25.6 kg/m .  Wt Readings from Last 3 Encounters:   01/27/25 88 kg (194 lb)   11/04/24 87.4 kg (192 lb 9.6 oz)   07/25/24 91.8 kg (202 lb 4.8 oz)       General: pleasant male. No acute distress.   Neck: No JVD  Lungs: clear to auscultation  COR:  regular rate and rhythm, No murmurs, rubs, or gallops  Extrem: No edema        Please refer above for cardiac ROS details.       Past History     Family History:   Family History   Problem Relation Age of Onset     " Breast Cancer Mother          age 51     C.A.D. Father      Hypertension Father      Diabetes Sister      Diabetes Brother      C.A.D. Maternal Grandfather         Social History:   Social History     Socioeconomic History     Marital status: Single     Spouse name: Not on file     Number of children: Not on file     Years of education: Not on file     Highest education level: Not on file   Occupational History     Not on file   Tobacco Use     Smoking status: Former     Current packs/day: 0.00     Types: Cigarettes     Quit date: 1995     Years since quittin.0     Smokeless tobacco: Never     Tobacco comments:     social   Vaping Use     Vaping status: Never Used   Substance and Sexual Activity     Alcohol use: Yes     Comment: rare     Drug use: No     Sexual activity: Not Currently   Other Topics Concern     Parent/sibling w/ CABG, MI or angioplasty before 65F 55M? Yes   Social History Narrative    2024; born [place] dad was first . Patient was a  and a .  28 years. Lives with third wife. Only has a dog now, cat . Current wife Yoanna is a retired nurse has several children from his prior relationships            Dairy/d 2-3 servings/d. Caffeine 0 servings/dExercise 5-7 x weekSunscreen used - NoSeatbelts used - YesWorking smoke/CO detectors in the home - YesGuns stored in the home - NoSelf Breast Exams - NASelf Testicular Exam - NoEye Exam up to date - YesDental Exam up to date - YesPap Smear up to date - NAMammogram up to date - NAPSA up to date - NADexa Scan up to date - NAFlex Sig / Colonoscopy up to date - YesImmunizations up to date - YesAbuse: Current or Past(Physical, Sexual or Emotional)- NoDo you feel safe in your environment - YesM. Nace, CMA     Social Drivers of Health     Financial Resource Strain: Low Risk  (2024)    Financial Resource Strain      Within the past 12 months, have you or your family members you live  with been unable to get utilities (heat, electricity) when it was really needed?: No   Food Insecurity: Low Risk  (4/30/2024)    Food Insecurity      Within the past 12 months, did you worry that your food would run out before you got money to buy more?: No      Within the past 12 months, did the food you bought just not last and you didn t have money to get more?: No   Transportation Needs: Low Risk  (4/30/2024)    Transportation Needs      Within the past 12 months, has lack of transportation kept you from medical appointments, getting your medicines, non-medical meetings or appointments, work, or from getting things that you need?: No   Physical Activity: Insufficiently Active (4/30/2024)    Exercise Vital Sign      Days of Exercise per Week: 6 days      Minutes of Exercise per Session: 20 min   Stress: No Stress Concern Present (4/30/2024)    Citizen of [place] of Occupational Health - Occupational Stress Questionnaire      Feeling of Stress : Not at all   Social Connections: Unknown (4/30/2024)    Social Connection and Isolation Panel [NHANES]      Frequency of Communication with Friends and Family: Not on file      Frequency of Social Gatherings with Friends and Family: Never      Attends Islam Services: Not on file      Active Member of Clubs or Organizations: Not on file      Attends Club or Organization Meetings: Not on file      Marital Status: Not on file   Interpersonal Safety: Unknown (7/18/2024)    Received from [facility]    Humiliation, Afraid, Rape, and Kick questionnaire      Fear of Current or Ex-Partner: Not on file      Emotionally Abused: No      Physically Abused: No      Sexually Abused: No   Housing Stability: Low Risk  (4/30/2024)    Housing Stability      Do you have housing? : Yes      Are you worried about losing your housing?: No            Lab Results    Chemistry/lipid CBC Cardiac Enzymes/BNP/TSH/INR   Lab Results   Component Value Date    CHOL 76 07/25/2024    HDL 21 (L)  07/25/2024    TRIG 139 07/25/2024    BUN 16.7 11/04/2024     11/04/2024    CO2 27 11/04/2024    Lab Results   Component Value Date    WBC 6.7 07/25/2024    HGB 14.2 07/25/2024    HCT 43.6 07/25/2024    MCV 89 07/25/2024     07/25/2024    Lab Results   Component Value Date    TSH 1.30 07/25/2024          Cardiac Problems and Cardiac Diagnostics     Most Recent Cardiac testing:  [facility]  7/18/2024  Sinus tachycardia   Nonspecific T wave abnormality   Abnormal ECG     ECHO 10/16/2023 ([facility])    1. Sinus tachycardia during study.     2. Normal LV size with mildly increased wall thickness. Visually   estimated   LVEF 50-55%. No regional wall motion abnormalities. (low normal function).   Indeterminate diastolic function.     3. Normal RV size and systolic function.     4. Mild LA enlargement.     5. No significant valvular abnormalities.     6. Compared to the prior study from 11/2022, the current study reveals   patient is tachycardic.          Assessment/Recommendations   Assessment:    Mr. David Gutierrez Jr. is a 79 year old male with a significant past history of HTN, DM2,who presents for evaluation of his HTN.      HTN   - BP reasonably well controlled however do worry about the combination of two AVNB agents as well as the lack of ACE/ARB especially given microalbuminuria.   - Will stop verapamil.  Start telmisartan 40mg qdaily.  Check BMP in 1 week.  Send BP log in 3-4 weeks   - Suspect he will need 2 agents for his BP going forward but will see what kind of response we get with telmisartan.     Sinus tachycardia   - Seemingly higher resting HR in the high 90s   - Does have not symptoms from this.  Suspect he may have IST   - Will get an echocardiogram to ensure no other underlying cardiac process is playing a role.    RTC 6 months    The longitudinal plan of care for the diagnosis(es)/condition(s) as documented were addressed during this visit. Due to the added complexity in  care, I will continue to support David in the subsequent management and with ongoing continuity of care.           Beau Claudio DO [facility]  Non-invasive Cardiologist  [facility] Care         Thank you for allowing me to participate in the care of your patient.      Sincerely,     Beau Claudio DO     [facility] Heart Care  cc:   Referred MD Chema  No address on file

## 2025-01-27 NOTE — PATIENT INSTRUCTIONS
It was a pleasure meeting you today    In summary:     - Stop verapamil   - Start telmisartan 40mg once daily.  Check a BMP labwork in 1 week   - Continue carvedilol 12.5mg twice daily for now   - Send in a BP log in 1 month      Please call my nurse Chintan Copeland at [phone] if you have any questions or issues.    We will schedule a follow up visit in  6 months      Beau Claudio DO [facility]  Non-invasive Cardiologist  [facility]

## 2025-01-27 NOTE — PROGRESS NOTES
[facility] HEART CARE   [address]  [place] [address]   [email or web address]   OFFICE: [phone]     CARDIOLOGY CLINIC NOTE     Thank you, Aimee Head, for asking the [facility] Heart Care team to see Mr. David uGtierrez Jr. to evaluate New Patient        History of Present Illness   Mr. David Gutierrez Jr. is a 79 year old male with a significant past history of HTN, DM2,who presents for evaluation of his HTN.  The patient notes he has been doing well overall.  He denies any chest symptoms, no exertional dyspnea, no lightheadedness or syncope.  He takes verapamil and carvedilol for BP.  He would very much like to be only on one antihypertensive.  It seems his regimen was changed at one point in time as he used to be on lisinopril which was swapped for verapamil for reasons that are unclear.  This was by his previous PCP.  Carvedilol was also started along the way.  He is known to have a higher resting heart rate but he has no symptoms related to this.         Medications  Allergies   Current Outpatient Medications   Medication Sig Dispense Refill    alcohol swab prep pads Use to swab area of injection/checo as directed. 100 each 3    aspirin (ASA) 81 MG tablet Take 1 tablet (81 mg) by mouth daily 90 tablet 3    blood glucose (NO BRAND SPECIFIED) test strip Use to test blood sugar 2 times daily before breakfast and at dinner. Preferred blood glucose meter OR supplies to accompany: Blood Glucose Monitor Brands: per insurance. 100 strip 6    blood glucose calibration (NO BRAND SPECIFIED) solution To accompany: Blood Glucose Monitor Brands: per insurance. 1 each 1    blood glucose monitoring (NO BRAND SPECIFIED) meter device kit Use to test blood sugar 2 times daily before breakfast and at dinner. Preferred blood glucose meter OR supplies to accompany: Blood Glucose Monitor Brands: per insurance. 1 kit 0    carvedilol (COREG) 12.5 MG tablet Take 1 tablet  "(12.5 mg) by mouth 2 times daily (with meals). 180 tablet 3    Continuous Glucose Sensor (FREESTYLE HARPAL 2 SENSOR) MISC 1 each every 14 days Use 1 sensor every 14 days. Use to read blood sugars per 's instructions. (Patient not taking: Reported on 2024) 2 each 5    empagliflozin (JARDIANCE) 25 MG TABS tablet Take 1 tablet (25 mg) by mouth daily. 90 tablet 1    metFORMIN (GLUCOPHAGE) 1000 MG tablet TAKE ONE TABLET BY MOUTH TWICE A DAY WITH MEALS 180 tablet 1    rosuvastatin (CRESTOR) 20 MG tablet Take 1 tablet (20 mg) by mouth daily. 90 tablet 1    Semaglutide, 1 MG/DOSE, (OZEMPIC) 4 MG/3ML pen Inject 1 mg subcutaneously every 7 days. 9 mL 1    thin (NO BRAND SPECIFIED) lancets Use with lanceting device. To accompany: Blood Glucose Monitor Brands: per insurance. 100 each 6    verapamil (CALAN) 40 MG tablet Take 20 mg (half -tablet) every morning with Verapamil  mg for total of 140 mg daily 90 tablet 0    verapamil ER (VERELAN) 120 MG 24 hr capsule TAKE ONE CAPSULE BY MOUTH EVERY NIGHT AT BEDTIME 90 capsule 0      Allergies   Allergen Reactions    No Known Drug Allergy         Physical Examination Review of Systems   Vitals: Ht 1.854 m (6' 1\")   Wt 88 kg (194 lb)   BMI 25.60 kg/m    BMI= Body mass index is 25.6 kg/m .  Wt Readings from Last 3 Encounters:   25 88 kg (194 lb)   24 87.4 kg (192 lb 9.6 oz)   24 91.8 kg (202 lb 4.8 oz)       General: pleasant male. No acute distress.   Neck: No JVD  Lungs: clear to auscultation  COR:  regular rate and rhythm, No murmurs, rubs, or gallops  Extrem: No edema        Please refer above for cardiac ROS details.       Past History     Family History:   Family History   Problem Relation Age of Onset    Breast Cancer Mother          age 51    C.A.D. Father     Hypertension Father     Diabetes Sister     Diabetes Brother     C.A.D. Maternal Grandfather         Social History:   Social History     Socioeconomic History    Marital " status: Single     Spouse name: Not on file    Number of children: Not on file    Years of education: Not on file    Highest education level: Not on file   Occupational History    Not on file   Tobacco Use    Smoking status: Former     Current packs/day: 0.00     Types: Cigarettes     Quit date: 1995     Years since quittin.0    Smokeless tobacco: Never    Tobacco comments:     social   Vaping Use    Vaping status: Never Used   Substance and Sexual Activity    Alcohol use: Yes     Comment: rare    Drug use: No    Sexual activity: Not Currently   Other Topics Concern    Parent/sibling w/ CABG, MI or angioplasty before 65F 55M? Yes   Social History Narrative    2024; [place] dad was first . Patient was a  and a .  28 years. Lives with third wife. Only has a dog now, cat . Current wife Yoanna is a retired nurse has several children from his prior relationships            Dairy/d 2-3 servings/d. Caffeine 0 servings/dExercise 5-7 x weekSunscreen used - NoSeatbelts used - YesWorking smoke/CO detectors in the home - YesGuns stored in the home - NoSelf Breast Exams - NASelf Testicular Exam - NoEye Exam up to date - YesDental Exam up to date - YesPap Smear up to date - NAMammogram up to date - NAPSA up to date - NADexa Scan up to date - NAFlex Sig / Colonoscopy up to date - YesImmunizations up to date - YesAbuse: Current or Past(Physical, Sexual or Emotional)- NoDo you feel safe in your environment - YesM. Nace, CMA     Social Drivers of Health     Financial Resource Strain: Low Risk  (2024)    Financial Resource Strain     Within the past 12 months, have you or your family members you live with been unable to get utilities (heat, electricity) when it was really needed?: No   Food Insecurity: Low Risk  (2024)    Food Insecurity     Within the past 12 months, did you worry that your food would run out before you got money to buy more?:  No     Within the past 12 months, did the food you bought just not last and you didn t have money to get more?: No   Transportation Needs: Low Risk  (4/30/2024)    Transportation Needs     Within the past 12 months, has lack of transportation kept you from medical appointments, getting your medicines, non-medical meetings or appointments, work, or from getting things that you need?: No   Physical Activity: Insufficiently Active (4/30/2024)    Exercise Vital Sign     Days of Exercise per Week: 6 days     Minutes of Exercise per Session: 20 min   Stress: No Stress Concern Present (4/30/2024)    [place] of Occupational Health - Occupational Stress Questionnaire     Feeling of Stress : Not at all   Social Connections: Unknown (4/30/2024)    Social Connection and Isolation Panel [NHANES]     Frequency of Communication with Friends and Family: Not on file     Frequency of Social Gatherings with Friends and Family: Never     Attends Confucianism Services: Not on file     Active Member of Clubs or Organizations: Not on file     Attends Club or Organization Meetings: Not on file     Marital Status: Not on file   Interpersonal Safety: Unknown (7/18/2024)    Received from [facility]    Humiliation, Afraid, Rape, and Kick questionnaire     Fear of Current or Ex-Partner: Not on file     Emotionally Abused: No     Physically Abused: No     Sexually Abused: No   Housing Stability: Low Risk  (4/30/2024)    Housing Stability     Do you have housing? : Yes     Are you worried about losing your housing?: No            Lab Results    Chemistry/lipid CBC Cardiac Enzymes/BNP/TSH/INR   Lab Results   Component Value Date    CHOL 76 07/25/2024    HDL 21 (L) 07/25/2024    TRIG 139 07/25/2024    BUN 16.7 11/04/2024     11/04/2024    CO2 27 11/04/2024    Lab Results   Component Value Date    WBC 6.7 07/25/2024    HGB 14.2 07/25/2024    HCT 43.6 07/25/2024    MCV 89 07/25/2024     07/25/2024    Lab Results   Component  Value Date    TSH 1.30 07/25/2024          Cardiac Problems and Cardiac Diagnostics     Most Recent Cardiac testing:  [facility]  7/18/2024  Sinus tachycardia   Nonspecific T wave abnormality   Abnormal ECG     ECHO 10/16/2023 ([facility])    1. Sinus tachycardia during study.     2. Normal LV size with mildly increased wall thickness. Visually   estimated   LVEF 50-55%. No regional wall motion abnormalities. (low normal function).   Indeterminate diastolic function.     3. Normal RV size and systolic function.     4. Mild LA enlargement.     5. No significant valvular abnormalities.     6. Compared to the prior study from 11/2022, the current study reveals   patient is tachycardic.          Assessment/Recommendations   Assessment:    Mr. David Gutierrez Jr. is a 79 year old male with a significant past history of HTN, DM2,who presents for evaluation of his HTN.      HTN   - BP reasonably well controlled however do worry about the combination of two AVNB agents as well as the lack of ACE/ARB especially given microalbuminuria.   - Will stop verapamil.  Start telmisartan 40mg qdaily.  Check BMP in 1 week.  Send BP log in 3-4 weeks   - Suspect he will need 2 agents for his BP going forward but will see what kind of response we get with telmisartan.     Sinus tachycardia   - Seemingly higher resting HR in the high 90s   - Does have not symptoms from this.  Suspect he may have IST   - Will get an echocardiogram to ensure no other underlying cardiac process is playing a role.    RTC 6 months    The longitudinal plan of care for the diagnosis(es)/condition(s) as documented were addressed during this visit. Due to the added complexity in care, I will continue to support David in the subsequent management and with ongoing continuity of care.           Beau lCaudio, DO [facility]  Non-invasive Cardiologist  [facility]

## 2025-02-04 ENCOUNTER — OFFICE VISIT (OUTPATIENT)
Dept: FAMILY MEDICINE | Facility: CLINIC | Age: 80
End: 2025-02-04
Payer: COMMERCIAL

## 2025-02-04 VITALS
DIASTOLIC BLOOD PRESSURE: 80 MMHG | HEART RATE: 104 BPM | SYSTOLIC BLOOD PRESSURE: 160 MMHG | WEIGHT: 192 LBS | BODY MASS INDEX: 26.01 KG/M2 | HEIGHT: 72 IN | OXYGEN SATURATION: 99 % | TEMPERATURE: 97.6 F | RESPIRATION RATE: 20 BRPM

## 2025-02-04 DIAGNOSIS — E11.29 MICROALBUMINURIA DUE TO TYPE 2 DIABETES MELLITUS (H): ICD-10-CM

## 2025-02-04 DIAGNOSIS — K05.10 CHRONIC GINGIVITIS: ICD-10-CM

## 2025-02-04 DIAGNOSIS — Z97.8 CHRONIC INDWELLING FOLEY CATHETER: ICD-10-CM

## 2025-02-04 DIAGNOSIS — E11.9 TYPE 2 DIABETES, HBA1C GOAL < 7% (H): Primary | ICD-10-CM

## 2025-02-04 DIAGNOSIS — R46.89 CLOTHING DISHEVELED: ICD-10-CM

## 2025-02-04 DIAGNOSIS — R33.8 BENIGN PROSTATIC HYPERPLASIA WITH URINARY RETENTION: ICD-10-CM

## 2025-02-04 DIAGNOSIS — I10 HYPERTENSION GOAL BP (BLOOD PRESSURE) < 140/90: ICD-10-CM

## 2025-02-04 DIAGNOSIS — K22.89 ESOPHAGEAL THICKENING: ICD-10-CM

## 2025-02-04 DIAGNOSIS — K80.20 CALCULUS OF GALLBLADDER WITHOUT CHOLECYSTITIS WITHOUT OBSTRUCTION: ICD-10-CM

## 2025-02-04 DIAGNOSIS — K76.0 HEPATIC STEATOSIS: ICD-10-CM

## 2025-02-04 DIAGNOSIS — Z23 NEED FOR SHINGLES VACCINE: ICD-10-CM

## 2025-02-04 DIAGNOSIS — R00.0 SINUS TACHYCARDIA: ICD-10-CM

## 2025-02-04 DIAGNOSIS — R80.9 MICROALBUMINURIA DUE TO TYPE 2 DIABETES MELLITUS (H): ICD-10-CM

## 2025-02-04 DIAGNOSIS — E78.5 HYPERLIPIDEMIA WITH TARGET LDL LESS THAN 100: ICD-10-CM

## 2025-02-04 DIAGNOSIS — E04.2 MULTINODULAR GOITER: ICD-10-CM

## 2025-02-04 DIAGNOSIS — N40.1 BENIGN PROSTATIC HYPERPLASIA WITH URINARY RETENTION: ICD-10-CM

## 2025-02-04 DIAGNOSIS — N18.31 STAGE 3A CHRONIC KIDNEY DISEASE (H): ICD-10-CM

## 2025-02-04 LAB
BASOPHILS # BLD AUTO: 0 10E3/UL (ref 0–0.2)
BASOPHILS NFR BLD AUTO: 1 %
EOSINOPHIL # BLD AUTO: 0.2 10E3/UL (ref 0–0.7)
EOSINOPHIL NFR BLD AUTO: 3 %
ERYTHROCYTE [DISTWIDTH] IN BLOOD BY AUTOMATED COUNT: 13.4 % (ref 10–15)
EST. AVERAGE GLUCOSE BLD GHB EST-MCNC: 151 MG/DL
HBA1C MFR BLD: 6.9 % (ref 0–5.6)
HCT VFR BLD AUTO: 46.6 % (ref 40–53)
HGB BLD-MCNC: 14.8 G/DL (ref 13.3–17.7)
IMM GRANULOCYTES # BLD: 0 10E3/UL
IMM GRANULOCYTES NFR BLD: 0 %
LYMPHOCYTES # BLD AUTO: 1.4 10E3/UL (ref 0.8–5.3)
LYMPHOCYTES NFR BLD AUTO: 25 %
MCH RBC QN AUTO: 28.7 PG (ref 26.5–33)
MCHC RBC AUTO-ENTMCNC: 31.8 G/DL (ref 31.5–36.5)
MCV RBC AUTO: 90 FL (ref 78–100)
MONOCYTES # BLD AUTO: 0.6 10E3/UL (ref 0–1.3)
MONOCYTES NFR BLD AUTO: 11 %
NEUTROPHILS # BLD AUTO: 3.4 10E3/UL (ref 1.6–8.3)
NEUTROPHILS NFR BLD AUTO: 60 %
PLATELET # BLD AUTO: 335 10E3/UL (ref 150–450)
RBC # BLD AUTO: 5.16 10E6/UL (ref 4.4–5.9)
WBC # BLD AUTO: 5.7 10E3/UL (ref 4–11)

## 2025-02-04 PROCEDURE — 80061 LIPID PANEL: CPT | Performed by: FAMILY MEDICINE

## 2025-02-04 PROCEDURE — 84443 ASSAY THYROID STIM HORMONE: CPT | Performed by: FAMILY MEDICINE

## 2025-02-04 PROCEDURE — 99214 OFFICE O/P EST MOD 30 MIN: CPT | Performed by: FAMILY MEDICINE

## 2025-02-04 PROCEDURE — 36415 COLL VENOUS BLD VENIPUNCTURE: CPT | Performed by: FAMILY MEDICINE

## 2025-02-04 PROCEDURE — 80053 COMPREHEN METABOLIC PANEL: CPT | Performed by: FAMILY MEDICINE

## 2025-02-04 PROCEDURE — G2211 COMPLEX E/M VISIT ADD ON: HCPCS | Performed by: FAMILY MEDICINE

## 2025-02-04 PROCEDURE — 83036 HEMOGLOBIN GLYCOSYLATED A1C: CPT | Performed by: FAMILY MEDICINE

## 2025-02-04 PROCEDURE — 85025 COMPLETE CBC W/AUTO DIFF WBC: CPT | Performed by: FAMILY MEDICINE

## 2025-02-04 RX ORDER — TELMISARTAN 80 MG/1
80 TABLET ORAL DAILY
Qty: 90 TABLET | Refills: 3 | Status: SHIPPED | OUTPATIENT
Start: 2025-02-04

## 2025-02-04 NOTE — PROGRESS NOTES
The below note was dictated using voice recognition. Although reviewed after completion, some word and grammatical error may remain .        Assessment & Plan     Type 2 diabetes, Hb A1c goal < 7% (H)  Diabetes currently improved with hemoglobin A1c 6.9 on metformin 1000 mg bid( GFR > 35),  Jardiance 25 mg  and ozempic 1 mg wkly, asa & statin. CGM was not covered.  Fingerstick log not brought in.  Reports has a diabetes eye check next week.  Gives a history of early cataracts.  Foot exam done in July had been normal and reports no new concerns today.  Weight stable to November 2024 at 192 lbs. Will wait to see rest of labs not back yet from today before refilling any meds that need refilling.    History of microalbuminuria/  CKD 2 to  3 suspected due to diabetes and HTN. On Jardiance 25 mg and ARB.  Higher resolved anemia attributed in the past to chronic kidney disease.  Knows to avoid anti-inflammatories and stay hydrated.  Now under care of nephrology, seen by them in December & had lab work showing elevated microalbumin of 658 in December and seen by them recently on 30 January 2025 and continued on same meds.  To follow-up with them in 6 months.  Labs pending from today, awaiting to see kidney function since started ARB a week ago. Last creatinine was 1.2 in December.  Will be increasing Micardis to 80 and will need to recheck kidney function again in the near future.  Currently no contraindication to continue metformin.    HTN currently on Coreg 12.5 twice a day and Micardis 40 mg daily.  Previously stable on verapamil 120 + 1/2 of 40 mg  & coreg 12.5 mg bid. Had no known hx of Afib or a flutter, just sinus tachycardia, no history of heart failure and no leg swelling, not on a diuretic.  CT abdomen done in 2024 showed interval decrease in prior right pleural effusion that had been quite small.  Seen by cardiology on 27 January & they noted while BP reasonably well controlled there was concern about the  combination of two AVNB agents as well as the lack of ACE/ARB especially given microalbuminuria.  His verapamil was stopped and was started on telmisartan 40 mg daily  &was advised to recheck BMP in 1 week and send blood pressure log to cardiology in 3 to 4 weeks. They suspect he would continue to need 2 agents for his blood pressure but see the response he got with the telmisartan first.  Cardiology noted is asymptomatic sinus tachycardia with resting heart rates in the high 90s.  Was advised to get an echocardiogram to ensure had no underlying cardiac process playing a role in his tachycardia. He was to return to [facility] in 6 months.  Today blood pressure initially 203 x 109.  Recheck 196 x 98.  Recheck on right and left arms manually was 140 to 160 x 82 to 90.  Asymptomatic.  He was anxious.  Wife reports home readings are low at 128 x 72.  Home readings not brought in and encouraged to return for blood pressure check with the nurse and bring home her machine in for verification.  Was able to communicate with cardiology through [employer] agreeable to increasing Micardis to 80 mg. Advised to take 2 tablets of the 40 mg times the last 10 they have at home and once that is done may  the new prescription for 80 mg tablet 1 a day.  He is to return in a few weeks to the nurse for a blood pressure check and bring his home machine with to see how he is doing on current increased dosing.  Plan to recheck BMP at that time as well.  Awaiting kidney function to fill rest of meds.    HLD on Crestor 20 mg. In July 2024 LDL was goal HDL was low, CT head in summer 2024 showed chronic ischemic small vessel disease that was not new.  Encourage increased physical activity.       Hepatic steatosis.  Liver function tests have been normal, is on a statin. Has had some weight loss with Ozempic.  Weight currently stable to November 2024     Sinus tachycardia, resting heart rate ranges 90 to 105.  Asymptomatic and prior testing showed  normal CBC, B12, ferritin, TSH.  Had been on verapamil and Coreg till end of January, when a week ago seen by cardiology who discontinued the verapamil due to being on 2 AV cruz blockers and started him on an ARB and continued him on the Coreg 12.5 twice a day.  Ports no symptoms of palpitations.  Is to get an echocardiogram on the 20th for cardiology and go from there     Hx of multinodular goiter, reports had one nodule removed in past that was benign. No obstructive symptoms.  Thyroid function was normal in 2024 & pending from today.     Asymptomatic numerous tiny stones in the gallbladder with no biliary dilatation noted incidentally on CT abdomen in July 2024.    Asymptomatic mild distal esophageal wall thickening unchanged noted on CT abdomen done in 2024.  CBC came back normal     Hx of BPH with urinary retention & a chronic indwelling catheter since November 2022, changed monthly & wears a leg bag. Was seen at [facility] on 7/24/24 for plugged trejo.  CT abdomen done for fever and altered mental status in the summer of 2024 showed bladder was thick-walled but decompressed containing a Trejo catheter.  Kidneys looked normal.  Had no retroperitoneal adenopathy.  Had significant prostatic hypertrophy no free fluid or acute bony abnormalities.  Had hx of MRSA UTI in 2022.  [facility] and was to continue with urinary catheterization Flomax discontinued PSA 5.9 noted normal for age secondary to prostatic enlargement and advised recheck ultrasound and BMP yearly with urology and after 5 years of catheterization to consider annual cystoscopy ( 2027).  Unclear if has had an ultrasound with urology in the recent past.  Is to continue with monthly cath changes.  Last cath changed jan 2025 and notes will get changed again on 10 February. Wears a leg bag. Counseled on good hygiene and cath care.     Hx of chronic gingivitis and seeing  his dentist for the    Hx of resolved episode of febrile altered mental status summer of 2024.  Has a small cataract.  Reports no falls and still driving reporting had his license renewed June 2024. I have only seen him 3 times so far but he looks like he wears the same clothes and it is very stained and he smells of stale urine.  His nails are long and has not cut them in weeks if not months.  He tends to be repetitive and talkative which could be due to anxiety but unknown if he has an underlying cognitive issue.  I noted in 2023 a neuro psych evaluation was ordered but never done.  This may be something to consider in the future.  A care coordinator referral was placed at last visit but he declined any help or in person eval /in-home evaluation.  His wife who is with him today also appears to have stained clothing and difficulty walking. Difficult to assess cognitive status at this point.  Last visit disability parking form filled and was counseled to only drive on well lit roads not highways during daytime in familiar locations only.  To assess if safe to drive every year.      Vaccines reviewed.  Up-to-date with flu COVID and RSV.  Did have Shingrix No. 1 on 12/11/2024 and advised could get Shingrex #2 anytime after 5 February at his pharmacy.  They plan to do it in June.  Return to see me in June but will make further recommendations based on pending labs and upcoming nurse blood pressure check excetra  - Comprehensive metabolic panel; Future  - Lipid panel reflex to direct LDL Fasting; Future  - Hemoglobin A1c; Future  - TSH with free T4 reflex; Future  - Comprehensive metabolic panel  - Lipid panel reflex to direct LDL Fasting  - Hemoglobin A1c  - TSH with free T4 reflex    Microalbuminuria due to type 2 diabetes mellitus (H)  Chronic kidney disease, stage 2 (mild)  History of microalbuminuria/  CKD 2 to  3 suspected due to diabetes and HTN. On Jardiance 25 mg and ARB.  Higher resolved anemia attributed in  the past to chronic kidney disease.  Knows to avoid anti-inflammatories and stay hydrated.  Now under care of nephrology, seen by them in December & had lab work showing elevated microalbumin of 658 in December and seen by them recently on 30 January 2025 and continued on same meds.  To follow-up with them in 6 months.  Labs pending from today, awaiting to see kidney function since started ARB a week ago. Last creatinine was 1.2 in December.  Will be increasing Micardis to 80 and will need to recheck kidney function again in the near future.  Currently no contraindication to continue metformin.  - CBC with Platelets & Differential; Future  - Comprehensive metabolic panel; Future  - CBC with Platelets & Differential  - Comprehensive metabolic panel    Hypertension goal BP (blood pressure) < 140/90  HTN currently on Coreg 12.5 twice a day and Micardis 40 mg daily.  Previously stable on verapamil 120 + 1/2 of 40 mg  & coreg 12.5 mg bid. Had no known hx of Afib or a flutter, just sinus tachycardia, no history of heart failure and no leg swelling, not on a diuretic.  CT abdomen done in 2024 showed interval decrease in prior right pleural effusion that had been quite small.  Seen by cardiology on 27 January & they noted while BP reasonably well controlled there was concern about the combination of two AVNB agents as well as the lack of ACE/ARB especially given microalbuminuria.  His verapamil was stopped and was started on telmisartan 40 mg daily  &was advised to recheck BMP in 1 week and send blood pressure log to cardiology in 3 to 4 weeks. They suspect he would continue to need 2 agents for his blood pressure but see the response he got with the telmisartan first.  Cardiology noted is asymptomatic sinus tachycardia with resting heart rates in the high 90s.  Was advised to get an echocardiogram to ensure had no underlying cardiac process playing a role in his tachycardia. He was to return to [facility] in 6 months.  Today  blood pressure initially 203 x 109.  Recheck 196 x 98.  Recheck on right and left arms manually was 140 to 160 x 82 to 90.  Asymptomatic.  He was anxious.  Wife reports home readings are low at 128 x 72.  Home readings not brought in and encouraged to return for blood pressure check with the nurse and bring home her machine in for verification.  Was able to communicate with cardiology through [employer] agreeable to increasing Micardis to 80 mg. Advised to take 2 tablets of the 40 mg times the last 10 they have at home and once that is done may  the new prescription for 80 mg tablet 1 a day.  He is to return in a few weeks to the nurse for a blood pressure check and bring his home machine with to see how he is doing on current increased dosing.  Plan to recheck BMP at that time as well.  Awaiting kidney function to fill rest of meds.  - Comprehensive metabolic panel; Future  - Lipid panel reflex to direct LDL Fasting; Future  - TSH with free T4 reflex; Future  - Comprehensive metabolic panel  - Lipid panel reflex to direct LDL Fasting  - TSH with free T4 reflex  - telmisartan (MICARDIS) 80 MG tablet; Take 1 tablet (80 mg) by mouth daily.    Hyperlipidemia with target LDL less than 100  HLD on Crestor 20 mg. In July 2024 LDL was goal HDL was low, CT head in summer 2024 showed chronic ischemic small vessel disease that was not new.  Encourage increased physical activity.    - Lipid panel reflex to direct LDL Fasting; Future  - TSH with free T4 reflex; Future  - Lipid panel reflex to direct LDL Fasting  - TSH with free T4 reflex    Hepatic steatosis  Hepatic steatosis.  Liver function tests have been normal, is on a statin. Has had some weight loss with Ozempic.  Weight currently stable to November 2024  - Lipid panel reflex to direct LDL Fasting; Future  - Lipid panel reflex to direct LDL Fasting    Sinus tachycardia  Sinus tachycardia, resting heart rate ranges 90 to 105.  Asymptomatic and prior testing showed  normal CBC, B12, ferritin, TSH.  Had been on verapamil and Coreg till end of January, when a week ago seen by cardiology who discontinued the verapamil due to being on 2 AV cruz blockers and started him on an ARB and continued him on the Coreg 12.5 twice a day.  Ports no symptoms of palpitations.  Is to get an echocardiogram on the 20th for cardiology and go from there  - CBC with Platelets & Differential; Future  - TSH with free T4 reflex; Future  - CBC with Platelets & Differential  - TSH with free T4 reflex    Multinodular goiter  Hx of multinodular goiter, reports had one nodule removed in past that was benign. No obstructive symptoms.  Thyroid function was normal in 2024 & pending from today.  - TSH with free T4 reflex; Future  - TSH with free T4 reflex    Calculus of gallbladder without cholecystitis without obstruction  Asymptomatic numerous tiny stones in the gallbladder with no biliary dilatation noted incidentally on CT abdomen in July 2024.  - Comprehensive metabolic panel; Future  - Comprehensive metabolic panel    Esophageal thickening  Asymptomatic mild distal esophageal wall thickening unchanged noted on CT abdomen done in 2024.  CBC came back normal  - CBC with Platelets & Differential; Future  - CBC with Platelets & Differential    Benign prostatic hyperplasia with urinary retention  Chronic indwelling Trejo catheter  Hx of BPH with urinary retention & a chronic indwelling catheter since November 2022, changed monthly & wears a leg bag. Was seen at [facility] on 7/24/24 for plugged trejo.  CT abdomen done for fever and altered mental status in the summer of 2024 showed bladder was thick-walled but decompressed containing a Trejo catheter.  Kidneys looked normal.  Had no retroperitoneal adenopathy.  Had significant prostatic hypertrophy no free fluid or acute bony abnormalities.  Had hx of MRSA UTI in 2022.  Under care of urology at [facility]. On 9/19 /24 seen by urologist at  [facility] and was to continue with urinary catheterization Flomax discontinued PSA 5.9 noted normal for age secondary to prostatic enlargement and advised recheck ultrasound and BMP yearly with urology and after 5 years of catheterization to consider annual cystoscopy ( 2027).  Unclear if has had an ultrasound with urology in the recent past.  Is to continue with monthly cath changes.  Last cath changed jan 2025 and notes will get changed again on 10 February. Wears a leg bag. Counseled on good hygiene and cath care.    Chronic gingivitis  Hx of chronic gingivitis and seeing his dentist for the    Clothing disheveled  Hx of resolved episode of febrile altered mental status summer of 2024.  Has a small cataract.  Reports no falls and still driving reporting had his license renewed June 2024. I have only seen him 3 times so far but he looks like he wears the same clothes and it is very stained and he smells of stale urine.  His nails are long and has not cut them in weeks if not months.  He tends to be repetitive and talkative which could be due to anxiety but unknown if he has an underlying cognitive issue.  I noted in 2023 a neuro psych evaluation was ordered but never done.  This may be something to consider in the future.  A care coordinator referral was placed at last visit but he declined any help or in person eval /in-home evaluation.  His wife who is with him today also appears to have stained clothing and difficulty walking. Difficult to assess cognitive status at this point.  Last visit disability parking form filled and was counseled to only drive on well lit roads not highways during daytime in familiar locations only.  To assess if safe to drive every year.    - Primary Care - Care Coordination Referral; Future    Need for shingles vaccine  Vaccines reviewed.  Up-to-date with flu COVID and RSV.  Did have Shingrix No. 1 on 12/11/2024 and advised could get Shingrex #2 anytime after 5 February at his  "pharmacy.  They plan to do it in June.  Return to see me in June but will make further recommendations based on pending labs and upcoming nurse blood pressure check excetra    BMI  Estimated body mass index is 26.29 kg/m  as calculated from the following:    Height as of this encounter: 1.82 m (5' 11.65\").    Weight as of this encounter: 87.1 kg (192 lb).   Weight management plan: Discussed healthy diet and exercise guidelines  Blood sugar testing frequency justification:  Risk of hypoglycemia with medication(s)  Regular exercise  See Patient Instructions  The longitudinal plan of care for the diagnosis(es)/condition(s) as documented were addressed during this visit. Due to the added complexity in care, I will continue to support David in the subsequent management and with ongoing continuity of care.        Genia Hernandez is a 79 year old, presenting for the following health issues:  Follow Up, Diabetes, and Hypertension        2/4/2025     2:48 PM   Additional Questions   Roomed by brady   Accompanied by wife     History of Present Illness       CKD: He is not using over the counter pain medicine.     Diabetes:   He presents for follow up of diabetes.  He is checking home blood glucose three times daily.   He checks blood glucose before meals and after meals.  Blood glucose is never over 200 and never under 70.  When his blood glucose is low, the patient is asymptomatic for confusion, blurred vision, lethargy and reports not feeling dizzy, shaky, or weak.   He has no concerns regarding his diabetes at this time.   He is not experiencing numbness or burning in feet, excessive thirst, blurry vision, weight changes or redness, sores or blisters on feet. The patient has not had a diabetic eye exam in the last 12 months.          Hypertension: He presents for follow up of hypertension.  He does check blood pressure  regularly outside of the clinic. Outpatient blood pressures have not been over 140/90. He " follows a low salt diet.     He eats 2-3 servings of fruits and vegetables daily.He consumes 0 sweetened beverage(s) daily.He exercises with enough effort to increase his heart rate 20 to 29 minutes per day.  He exercises with enough effort to increase his heart rate 3 or less days per week.   He is taking medications regularly.     CURRENTLY  Diabetes currently improved with hemoglobin A1c 6.9 on metformin 1000 mg bid( GFR > 35),  Jardiance 25 mg  and ozempic 1 mg wkly, asa & statin. CGM was not covered.  Fingerstick log not brought in.  Reports has a diabetes eye check next week.  Gives a history of early cataracts.  Foot exam done in July had been normal and reports no new concerns today.  Weight stable to November 2024 at 192 lbs. Will wait to see rest of labs not back yet from today before refilling any meds that need refilling.    History of microalbuminuria suspected due to diabetes and HTN. On Jardiance 25 mg and ARB.  Knows to avoid anti-inflammatories and stay hydrated.  Now under care of nephrology seen by them in December had lab work showing elevated microalbumin of 658 in December and seen by them recently on 30 January.  To follow-up with them in 6 months.    CKD 2 to  3, prior resolved anemia due to CKD, Drinks 6 to 8 glasses of water a day.  Care of nephrology.  Kidney function since started ARB a week ago pending.  Last creatinine was 1.2 in December.  Currently no contraindication to continue metformin.    HTN currently on Coreg 12.5 twice a day and Micardis 40 mg daily.  Previously stable on verapamil 120 + 1/2 of 40 mg  & coreg 12.5 mg bid. Had no known hx of Afib or a flutter, just sinus tachycardia, no history of heart failure and no leg swelling, not on a diuretic.  CT abdomen done in 2024 showed interval decrease in prior right pleural effusion that had been quite small.  Seen by cardiology on 27 January & they noted while BP reasonably well controlled there was concern about the combination  of two AVNB agents as well as the lack of ACE/ARB especially given microalbuminuria.  His verapamil was stopped and was started on telmisartan 40 mg daily was advised to recheck BMP in 1 week and send blood pressure log to cardiology in 3 to 4 weeks. They suspect he would continue to need 2 agents for his blood pressure but see the response he got with the telmisartan first.  Cardiology noted is asymptomatic sinus tachycardia with resting heart rates in the high 90s.  Was advised to get an echocardiogram to ensure had no underlying cardiac process playing a role in his tachycardia. [place] [facility] [place] [facility] [place].  Today blood pressure initially 203 x 109.  Recheck 196 x 98.  Recheck on right and left arms manually was 140 to 160 x 82 to 90.  Asymptomatic.  He was anxious.  Wife reports home readings are low at 128 x 72.  Home readings not brought in and encouraged to return for blood pressure check with the nurse and bring home her machine in for verification    HLD on Crestor 20 mg. In July 2024 LDL was goal HDL was low, CT head in summer 2024 showed chronic ischemic small vessel disease that was not new.  Encourage increased physical activity.       Hepatic steatosis.  Liver function tests have been normal, is on a statin. Has had some weight loss with Ozempic.     Sinus tachycardia, resting heart rate ranges 90 to 105.  Asymptomatic and prior testing showed normal CBC, B12, ferritin, TSH.  Has been on verapamil and Coreg since end of January a week ago taken off verapamil and put on an ARB and continued on Coreg 12.5 twice a day.  Has had no change in pulse.  Is to get an echocardiogram on the 20th for cardiology and go from there     Hx of multinodular goiter, reports had one nodule removed in past that was benign. No obstructive symptoms.  Thyroid function was normal in 2024 & pending from today.     Asymptomatic numerous tiny stones in the gallbladder with no biliary dilatation noted incidentally on CT  abdomen in July 2024.    Asymptomatic mild distal esophageal wall thickening unchanged noted on CT abdomen done in 2024.     Hx of BPH with urinary retention & a chronic indwelling catheter since November 2022, changed monthly & wears a leg bag. Was seen at [facility] on 7/24/24 for plugged trejo.  CT abdomen done for fever and altered mental status in the summer of 2024 showed bladder was thick-walled but decompressed containing a Trejo catheter.  Kidneys looked normal.  Had no retroperitoneal adenopathy.  Had significant prostatic hypertrophy no free fluid or acute bony abnormalities.  Had hx of MRSA UTI in 2022.  Under care of urology at [facility]. On 9/19 /24 seen by urologist at [facility] and was to continue with urinary catheterization Flomax discontinued PSA 5.9 noted normal for age secondary to prostatic enlargement and advised recheck ultrasound and BMP yearly with urology and after 5 years of catheterization to consider annual cystoscopy ( 2027).  Unclear if has had an ultrasound with urology in the recent past.  Is to continue with monthly cath changes.  Last cath changed jan 2025 and notes will get changed again on 10 February. Wears a leg bag.     Hx of chronic gingivitis and seeing his dentist for the    Hx of resolved episode of febrile altered mental status summer of 2024.  Has a small cataract.  Reports no falls and still driving reporting had his license renewed June 2024. I have only seen him 3 times so far but he looks like he wears the same clothes and it is very stained and he smells of stale urine.  His nails are long and has not cut them in weeks if not months.  He tends to be repetitive and talkative which could be due to anxiety but unknown if he has an underlying cognitive issue.  I noted in 2023 a neuro psych evaluation was ordered but never done.  This may be something to consider in the future.  A care coordinator referral was placed at last visit but he declined any  help or in person eval /in-home evaluation.  His wife who is with him today also appears to have stained clothing and difficulty walking. Difficult to assess cognitive status at this point.  Last visit disability parking form filled and was counseled to only drive on well lit roads not highways during daytime in familiar locations only.  To assess if safe to drive every year.      Axes reviewed.  Up-to-date with flu COVID and RSV.  Did have Shingrix No. 1 on 12/11/2024 and advised could get the second 1 anytime after 5 February at his pharmacy.  They plan to do it in June.    BACKGROUND  79-year-old gentleman with history of uncontrolled diabetes, currently  improved on Jardiance, Ozempic 0.5 and metformin, hyperlipidemia on Crestor, hypertension, on Micardis and Coreg twice a day, previously on lisinopril then on verapamil but seen by cardiology in January 2025 and changed to Micardis due to risk of being on 2 AV cruz receptor blockers, hx of asymptomatic sinus tachycardia, no history of A-fib or flutter no history of ischemic heart disease or MI, pulse in the 100's, has hx of microalbuminuria, chronic kidney disease stage II reported history of anemia due to chronic kidney disease, now under care of nephrology, hx of BI 2022, no anemia noted on recent labs, multinodular goiter, reported had a nodule removed in 2003 was benign,  hx of urinary retention has a chronic indwelling Mathews catheter, with leg bag, history of possible BPH under care of urology at [facility] has appointment with them, hx of prior MRSA positive UTI, and noted was seen in ER 7/25 for malfunctioning Mathews, unclear why also on Flomax, hxof circumcision age 10,  asymptomatic gallstones noted on ct, hx of chronic gingivitis, family history of GI cancer noted in epic but patient denies that his dad had a hx of colorectal cancer so not sure how accurate the information is, hx of fall and head injury 10/15/23 when seen at health [facility] er and ct face,  c spine, head showed no acute hemorrhage or fracture, mild to mod vol loss and presumed choric small vessel ischemic changes of brain, repeat ct face and head in 1/2024 showed no new findings, 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     Seen at [facility] er on 7/18/24 brought in by EMS for weakness, confusion, and vomiting. Noted had been well. Had been to [employer] earlier& later wife reported he became newly confused and had difficulty driving. He was apparently pulled over by the police, denied alcohol use, and was followed home for his safety. Apparently was too weak to ambulate when returning home prompting EMS call. He was febrile for EMS at 100.7 orally. EMS fingerstick 215. EMS BPs 180's systolic. No interventions performed by EMS. His trejo had been changed 3 days prior ( changed monthly). Vital signs on arrival with hypertension, fever, tachycardia.he appeared encephalopathic with confusion to date, recent events, and current president. He appeared globally weak and had difficulty with performing tasks and participating with neurological exam. He was moving all extremities. Had no obvious facial droop or pronator drift. No nuchal rigidity. Trejo was changed out. New urine cultured. initial workup included CBC ( normal) , BMP( na 134), LFT's ( normal)  lipase ( normal) , troponin ( normal) , ethanol level ( normal) , blood cultures x2, point of care lactate, troponin, EKG, non contrast head CT, abdomen/pelvis with IV contrast, & was given Zosyn, vancomycin, Tylenol suppository, and IV fluids ordered. Lactate was elevated and given IVF bolus. Had normal lipase, troponin, EKG showed sinus tach, had no leucocytosis, undetectable alcohol, ct head unremarkable, CXR subtle density in left lung ?  Atelectasis versus early pneumonia, ct abdomen showed gall stones with no cholecystis. His mental status improved though remained tachycardic.UA was diff to interpret suggested colonization, later Cx came back negative. A repeat lactate was negative. He declined admission and was sent home on bactrim 3 days to cover possible MRSA and gram neg based on prior culture. Suspicion for pneumonia was low. He returned to [facility] er 7/24/24 for catheter malfunction and urinary retention with elevated BP. His catheter was replaced with resolution of symptoms and BP improved and was discharged home. They felt later might have been a case of food poisoning as recovered from that pretty quickly and all tests did not indicate source of fever or vomiting     Seen 7/25/24 to establish care with wife Yoanna  New to this provider. Noted Diabetes not controlled with HB A1c 9.7 in April, was on metformin 1000 mg bid( GFR > 35),  Jardiance 25 mg  and ozempic 0.25 wkly .asa & statin. Reported fs bid 184 to 210. CGM was not covered & desired order be resent. Encouraged a DM eye check. Reported hx of early cataract. Foot exam done was unremarkable. Hemoglobin A1c came back improved to 8.3 but still not at goal, Ozempic increased to 0.5 mg once a week. Kidney function slightly worse estimated GFR 58%.  Normal electrolytes and liver tests.  Continued on Jardiance metformin and Crestor. HLD on Crestor 20 mg. LDL was goal, HDL very low & encouraged to increase physical activity and continue Crestor at current dosing. HTN noted stable on verapamil 120 + 1/2 of 40 mg  & coreg 12.5 mg bid. Had no known of Afib or a flutter, just sinus tachycardia, No leg swelling. Not on a diuretic. No hx of Heart failure. Since BP under control was continued on this regimen but discussed in the future may need to make changes.to optimize care. Had a hx of microalbuminuria. Microalbumin remained significantly elevated, slightly improved from before, &  continued on Jardiance 25 mg but recommended given mild worsening of kidney function to see a nephrologist to take over kidney care. Hx of CKD 3, hx of reported anemia due to CKD, Drank 6 to 8 glasses of water a came back showing no anemia. Sinus tachycardia pulse 100 to 105. appeared chronic. Reported drinking enough water. Noted no fever & felt well. CBC showed no anemia. Vitamin B 12 was adequate. Ferritin l normal. & Thyroid function normal. To continue to monitor. Hx of multinodular goiter, reported had one nodule removed in past that was benign. Noted no obstructive symptoms. Reviewed recent hx of fever, AMS, nausea & vomiting on 7/18, seen in [facility] er, workup did not reveal UTI or pneumonia & no COVID testing done. Declined admission,improved rapidly with IVF and discharged on emperic bactrim 3 days given due to prior MRSA UTI in past. Final Cx urine and blood reported negative. Ct abdomen had shown mild distal esophageal wall thickening, unchanged. Potentially esophagitis. But reported no symptoms. Mild bibasilar atelectasis. Interval decrease in prior right pleural effusion, quite small. Numerous tiny stones in the gallbladder. No biliary dilatation. Mild steatosis.  No hydronephrosis. Bladder thick-walled but decompressed containing a Mathews catheter. Pancreas, bowels kidneys were normal  Normal caliber appendix. No bowel obstruction. No significant retroperitoneal adenopathy. No abdominal aortic aneurysm. Patent portal vein. Noted significant prostatic hypertrophy. No free fluid. No acute bony abnormalities. Ct head showed no acute process just chronic ischemic small vessel disease not new. CXR and labs were normal. Reported no falls & still driving, reported  had his license renewed in June 2024. Unclear etiology of now resolved febrile AMS episode. To continue to monitor. Noted had a hx of BPH with urinary retention & a chronic indwelling catheter changed monthly & wore a leg bag. [facility] trejo. This was changed. He  reported [facility] [place] [facility] and had an upcoming apt with them. Of note had hx of MRSA UTI in 2022. Deferred Flomax management to his urologist since he had a chronic indwelling catheter in place it seemed a little unusual to also be on Flomax. They were advised to contact the urologist regarding this medication. Hx of chronic gingivitis and encouraged to see his dentist regularly. Discussed RSV and shingles & could get at the pharmacy. Was to follow up in 3 months.     Nephrologist tried to give him an appointment sooner but times did not work with him and his wife and changed frequently and not till December now per patient choice.  On 8/23/2024 noted catheter changed at [facility].  On 9/19 seen by urologist at [facility] and was to continue with urinary catheterization Flomax discontinued PSA 5.9 noted normal for age secondary to prostatic enlargement and advise recheck ultrasound and BMP yearly with urology and after 5 years of catheterization to consider annual cystoscopy.  Was to continue with monthly cath changes.  Last cath change 10/18/2024.    Seen 11/4/24 Diabetes improved with hemoglobin A1c 7.1 on metformin 1000 mg bid( GFR > 35), Jardiance 25 mg  and ozempic 0.5 wkly, asa & statin. Reported fs bid 180 to 200.  CGM was not covered. DM eye check apparently due in February. Reported hx of early cataract. Foot exam done in July had been normal.  Weight was down about 10 pounds since last seen but sugars still elevated and agreeable to increasing Ozempic to 0.75 mg weekly and rechecking in 3 months.  It could not be increased to 0.75 so increase to 1 mg.    History of microalbuminuria suspected due to diabetes and blood pressure. On Jardiance 25 mg.  Knew to avoid anti-inflammatories and stay hydrated. To see nephrology in December. CKD 3, resolved reported anemia due to CKD, Drinking 6 to 8 glasses of  water a day. GFR 58% & trending down  & referred to nephrology CBC in July had been normal. Had appointment to see nephrologist in December. HTN  stable on verapamil 120 + 1/2 of 40 mg  & coreg 12.5 mg bid. Had no known hx of Afib or a flutter, just sinus tachycardia, no history of heart failure and no leg swelling, was not on a diuretic. CT abdomen done in 2024 showed interval decrease in prior right pleural effusion that was quite small.  Initially blood pressure 140 x 80 to recheck 130 x 80.  As expected to loose more weight, deferred changing this unconventional blood pressure management at the time.  HLD on Crestor 20 mg.  In July LDL was goal HDL was low, CT head in summer 2024 showed chronic ischemic small vessel disease that was not new.  Continued on same dosing and encouraged increased physical activity. Hepatic steatosis.  Liver function test normal, on a statin.  Working on weight loss with Ozempic. Sinus tachycardia pulse ranged 99 to 105 mostly in the 90's.  Felt well asymptomatic and prior testing showed normal CBC, B12, ferritin, TSH. Was on verapamil and Coreg. Hx of multinodular goiter, reported had one nodule removed in past that was benign. No obstructive symptoms.  Thyroid function was normal. Asymptomatic numerous tiny stones in the gallbladder with no biliary dilatation noted incidentally on CT abdomen in July 2024. Asymptomatic mild distal esophageal wall thickening unchanged noted on CT abdomen done in 2024.   Hx of BPH with urinary retention & a chronic indwelling catheter changed monthly & wore a leg bag. Was seen at [facility] ER on 7/2024 for plugged trejo. CT abdomen done for fever and altered mental status in the summer of 2024 showed bladder was thick-walled but decompressed containing a Trejo catheter.  Kidneys looked normal.  Had no retroperitoneal adenopathy.  Had significant prostatic hypertrophy no free fluid or acute bony abnormalities.  Had hx of MRSA UTI in 2022.  Under  care of urology at [facility]. On 9/19 /24 seen by urologist at [facility] and was to continue with urinary catheterization, Flomax discontinued. PSA 5.9 noted normal for age secondary to prostatic enlargement and advised recheck ultrasound and BMP yearly with urology and after 5 years of catheterization to consider annual cystoscopy.  Was to continue with monthly cath changes.  Last cath changed 10/18/2024. Wore a leg bag. Hx of chronic gingivitis and to see his dentist in 1 week.   Resolved episode of febrile altered mental status summer of 2024.  Had a small cataract. Reported no falls and still driving, reporting had his license renewed June 2024. Desiring DMV form filled for disability parking. I had only seen him twice but he noted to have dishevel sustained clothing smelling of stale urine and seemed to be repetitive at times unknown if due to anxiety or due to an underlying cognitive issue.  CT head did show chronic small vessel ischemic disease.  Wife apparently had no concerns.  Difficult to assess cognitive status. Was counseled to only drive on well lit roads not highways during daytime in familiar locations only.  And to assess if safe to drive every year.  DMV form filled for 1 year and original given to patient to take to the DMV office copy put in epic and to reassess yearly. Given high dose flu and COVID shot. Discussed to get the RSV and shingles vaccine at the pharmacy. & was to return in 3 months for blood pressure and diabetes check.  BMP came back stable and microalbumin is elevated encouraged to see nephrology.  Care coordinator try to get a hold of him and offered him in-home services but apparently patient declined in-home evaluation.  On 11/15 seen at [facility] for cath changed by urology had a antimicrobial 18 [place] cath placed.  He canceled the [place] nephrology appointment given and chose to see someone closer to his home in [place].  [facility] by [facility] [place] on 12/10/2024.  Had RSV on 12/11 and Shingrix No. 1.  Had a cath change 12/13/24.  Labs for nephrology showed microalbumin elevated at 658 creatinine 1.2 hemoglobin 13.  Notes had a cath change on 1/10.  Seen by cardiology at [place] on 1/27 and while blood pressure was reasonably controlled agreed that being on 2 AV cruz or blockers was probably not safe continued on Coreg twice a day and started on Micardis and verapamil discontinued.  Was to get a BMP at follow-up with PCP at appointment on the fourth and to send a blood pressure log into cardiology.  Was advised told to return in 6 months.  Seen by nephrology 1/30 for CKD 2 hypertension microalbuminuria BPH and noted blood pressure at that time was improved.  Was continued on same dose and advised her blood pressure was elevated to increase to 80 mg and if proteinuria persisted after being on medication started by cardiology they could add a pill or a known.  No dose adjustment was needed for the metformin and was advised to follow-up with them in 6 months      Review of Systems  Constitutional, HEENT, cardiovascular, pulmonary, GI, , musculoskeletal, neuro, skin, endocrine and psych systems are negative, except as otherwise noted.      Objective    BP (!) 196/98   Pulse 104   Temp 97.6  F (36.4  C) (Temporal)   Resp 20   Ht 1.82 m (5' 11.65\")   Wt 87.1 kg (192 lb)   SpO2 99%   BMI 26.29 kg/m    Body mass index is 26.29 kg/m .  Physical Exam   GENERAL: alert and no distress  EYES: Eyes grossly normal to inspection, PERRL and conjunctivae and sclerae normal  HENT: ear canals and TM's normal, nose and mouth without ulcers or lesions  NECK: no adenopathy, no asymmetry, masses, or scars  RESP: lungs clear to auscultation - no rales, rhonchi or wheezes  CV: regular rate and rhythm, normal S1 S2, no S3 or S4, no murmur, click or rub, no peripheral edema  ABDOMEN: soft, non tender  MS: no gross musculoskeletal defects noted, no " edema, purple cath bag attached to left lower extremity  SKIN: no suspicious lesions or rashes  NEURO: Normal strength and tone, sensory exam grossly normal, and repetitive but answers appropriately  PSYCH: mentation appears normal, tangential, affect normal/bright, anxious, speech pressured, appearance disheveled, and loads are stained look like the same once he is found the last time and smell of stale urine hygiene appears poor    Results for orders placed or performed in visit on 02/04/25   Hemoglobin A1c     Status: Abnormal   Result Value Ref Range    Estimated Average Glucose 151 (H) <117 mg/dL    Hemoglobin A1C 6.9 (H) 0.0 - 5.6 %   CBC with platelets and differential     Status: None   Result Value Ref Range    WBC Count 5.7 4.0 - 11.0 10e3/uL    RBC Count 5.16 4.40 - 5.90 10e6/uL    Hemoglobin 14.8 13.3 - 17.7 g/dL    Hematocrit 46.6 40.0 - 53.0 %    MCV 90 78 - 100 fL    MCH 28.7 26.5 - 33.0 pg    MCHC 31.8 31.5 - 36.5 g/dL    RDW 13.4 10.0 - 15.0 %    Platelet Count 335 150 - 450 10e3/uL    % Neutrophils 60 %    % Lymphocytes 25 %    % Monocytes 11 %    % Eosinophils 3 %    % Basophils 1 %    % Immature Granulocytes 0 %    Absolute Neutrophils 3.4 1.6 - 8.3 10e3/uL    Absolute Lymphocytes 1.4 0.8 - 5.3 10e3/uL    Absolute Monocytes 0.6 0.0 - 1.3 10e3/uL    Absolute Eosinophils 0.2 0.0 - 0.7 10e3/uL    Absolute Basophils 0.0 0.0 - 0.2 10e3/uL    Absolute Immature Granulocytes 0.0 <=0.4 10e3/uL   CBC with Platelets & Differential     Status: None    Narrative    The following orders were created for panel order CBC with Platelets & Differential.  Procedure                               Abnormality         Status                     ---------                               -----------         ------                     CBC with platelets and d...[[id number]]                      Final result                 Please view results for these tests on the individual orders.     Results for orders placed or  performed in visit on 02/04/25 (from the past 24 hours)   CBC with Platelets & Differential    Narrative    The following orders were created for panel order CBC with Platelets & Differential.  Procedure                               Abnormality         Status                     ---------                               -----------         ------                     CBC with platelets and d...[[id number]]                      Final result                 Please view results for these tests on the individual orders.   Hemoglobin A1c   Result Value Ref Range    Estimated Average Glucose 151 (H) <117 mg/dL    Hemoglobin A1C 6.9 (H) 0.0 - 5.6 %   CBC with platelets and differential   Result Value Ref Range    WBC Count 5.7 4.0 - 11.0 10e3/uL    RBC Count 5.16 4.40 - 5.90 10e6/uL    Hemoglobin 14.8 13.3 - 17.7 g/dL    Hematocrit 46.6 40.0 - 53.0 %    MCV 90 78 - 100 fL    MCH 28.7 26.5 - 33.0 pg    MCHC 31.8 31.5 - 36.5 g/dL    RDW 13.4 10.0 - 15.0 %    Platelet Count 335 150 - 450 10e3/uL    % Neutrophils 60 %    % Lymphocytes 25 %    % Monocytes 11 %    % Eosinophils 3 %    % Basophils 1 %    % Immature Granulocytes 0 %    Absolute Neutrophils 3.4 1.6 - 8.3 10e3/uL    Absolute Lymphocytes 1.4 0.8 - 5.3 10e3/uL    Absolute Monocytes 0.6 0.0 - 1.3 10e3/uL    Absolute Eosinophils 0.2 0.0 - 0.7 10e3/uL    Absolute Basophils 0.0 0.0 - 0.2 10e3/uL    Absolute Immature Granulocytes 0.0 <=0.4 10e3/uL           Signed Electronically by: Aimee Jones MD

## 2025-02-04 NOTE — LETTER
February 5, 2025      David Gutierrez .  [address]  [place] [address]        Dear ,    We are writing to inform you of your test results.    Results within acceptable limits.  -Normal red blood cell (hgb) levels, normal white blood cell count and normal platelet levels.   -A1C (test of diabetes control the last 2-3 months) is at your goal. Please continue with your current plan. Also, you should make an appointment to see me and recheck your A1C test in 6 months. .     Resulted Orders   Hemoglobin A1c   Result Value Ref Range    Estimated Average Glucose 151 (H) <117 mg/dL    Hemoglobin A1C 6.9 (H) 0.0 - 5.6 %      Comment:      Normal <5.7%   Prediabetes 5.7-6.4%    Diabetes 6.5% or higher     Note: Adopted from ADA consensus guidelines.   CBC with platelets and differential   Result Value Ref Range    WBC Count 5.7 4.0 - 11.0 10e3/uL    RBC Count 5.16 4.40 - 5.90 10e6/uL    Hemoglobin 14.8 13.3 - 17.7 g/dL    Hematocrit 46.6 40.0 - 53.0 %    MCV 90 78 - 100 fL    MCH 28.7 26.5 - 33.0 pg    MCHC 31.8 31.5 - 36.5 g/dL    RDW 13.4 10.0 - 15.0 %    Platelet Count 335 150 - 450 10e3/uL    % Neutrophils 60 %    % Lymphocytes 25 %    % Monocytes 11 %    % Eosinophils 3 %    % Basophils 1 %    % Immature Granulocytes 0 %    Absolute Neutrophils 3.4 1.6 - 8.3 10e3/uL    Absolute Lymphocytes 1.4 0.8 - 5.3 10e3/uL    Absolute Monocytes 0.6 0.0 - 1.3 10e3/uL    Absolute Eosinophils 0.2 0.0 - 0.7 10e3/uL    Absolute Basophils 0.0 0.0 - 0.2 10e3/uL    Absolute Immature Granulocytes 0.0 <=0.4 10e3/uL       If you have any questions or concerns, please call the clinic at the number listed above.       Sincerely,      Aimee Jones MD    Electronically signed

## 2025-02-04 NOTE — PATIENT INSTRUCTIONS
Increase temsilartan ( miocardis) to 80 mg daily ( may take 2 tabs of 40 mg ) daily   Labs for nephrology today   Apt with nurse for BP check and he is to bring his home machine   Given a card to have eye doctor send us their notes   Apt with me in June

## 2025-02-04 NOTE — RESULT ENCOUNTER NOTE
Results within acceptable limits.  -Normal red blood cell (hgb) levels, normal white blood cell count and normal platelet levels.  -A1C (test of diabetes control the last 2-3 months) is at your goal. Please continue with your current plan. Also, you should make an appointment to see me and recheck your A1C test in 6 months. .

## 2025-02-05 ENCOUNTER — TELEPHONE (OUTPATIENT)
Dept: FAMILY MEDICINE | Facility: CLINIC | Age: 80
End: 2025-02-05

## 2025-02-05 ENCOUNTER — LAB (OUTPATIENT)
Dept: LAB | Facility: CLINIC | Age: 80
End: 2025-02-05
Payer: COMMERCIAL

## 2025-02-05 DIAGNOSIS — R00.0 TACHYCARDIA: ICD-10-CM

## 2025-02-05 DIAGNOSIS — I10 HYPERTENSION GOAL BP (BLOOD PRESSURE) < 140/90: ICD-10-CM

## 2025-02-05 DIAGNOSIS — N18.2 CHRONIC KIDNEY DISEASE, STAGE II (MILD): Primary | ICD-10-CM

## 2025-02-05 LAB
ALBUMIN SERPL BCG-MCNC: 4 G/DL (ref 3.5–5.2)
ALBUMIN SERPL BCG-MCNC: 4.1 G/DL (ref 3.5–5.2)
ALP SERPL-CCNC: 87 U/L (ref 40–150)
ALT SERPL W P-5'-P-CCNC: 26 U/L (ref 0–70)
ANION GAP SERPL CALCULATED.3IONS-SCNC: 12 MMOL/L (ref 7–15)
ANION GAP SERPL CALCULATED.3IONS-SCNC: 16 MMOL/L (ref 7–15)
AST SERPL W P-5'-P-CCNC: 24 U/L (ref 0–45)
BILIRUB SERPL-MCNC: 0.6 MG/DL
BUN SERPL-MCNC: 17.5 MG/DL (ref 8–23)
BUN SERPL-MCNC: 17.8 MG/DL (ref 8–23)
CALCIUM SERPL-MCNC: 9.4 MG/DL (ref 8.8–10.4)
CALCIUM SERPL-MCNC: 9.7 MG/DL (ref 8.8–10.4)
CHLORIDE SERPL-SCNC: 101 MMOL/L (ref 98–107)
CHLORIDE SERPL-SCNC: 101 MMOL/L (ref 98–107)
CHOLEST SERPL-MCNC: 67 MG/DL
CREAT SERPL-MCNC: 1.3 MG/DL (ref 0.67–1.17)
CREAT SERPL-MCNC: 1.34 MG/DL (ref 0.67–1.17)
CREAT UR-MCNC: 50.3 MG/DL
EGFRCR SERPLBLD CKD-EPI 2021: 55 ML/MIN/1.73M2
EGFRCR SERPLBLD CKD-EPI 2021: 57 ML/MIN/1.73M2
FASTING STATUS PATIENT QL REPORTED: NO
FASTING STATUS PATIENT QL REPORTED: NO
GLUCOSE SERPL-MCNC: 153 MG/DL (ref 70–99)
GLUCOSE SERPL-MCNC: 189 MG/DL (ref 70–99)
HBV CORE AB SERPL QL IA: NONREACTIVE
HBV SURFACE AB SERPL IA-ACNC: <3.5 M[IU]/ML
HBV SURFACE AB SERPL IA-ACNC: NONREACTIVE M[IU]/ML
HBV SURFACE AG SERPL QL IA: NONREACTIVE
HCO3 SERPL-SCNC: 25 MMOL/L (ref 22–29)
HCO3 SERPL-SCNC: 27 MMOL/L (ref 22–29)
HCV AB SERPL QL IA: NONREACTIVE
HDLC SERPL-MCNC: 23 MG/DL
HIV 1+2 AB+HIV1 P24 AG SERPL QL IA: NONREACTIVE
LDLC SERPL CALC-MCNC: 21 MG/DL
MICROALBUMIN UR-MCNC: 201 MG/L
MICROALBUMIN/CREAT UR: 399.6 MG/G CR (ref 0–17)
NONHDLC SERPL-MCNC: 44 MG/DL
PHOSPHATE SERPL-MCNC: 3.5 MG/DL (ref 2.5–4.5)
POTASSIUM SERPL-SCNC: 4.1 MMOL/L (ref 3.4–5.3)
POTASSIUM SERPL-SCNC: 4.5 MMOL/L (ref 3.4–5.3)
PROT SERPL-MCNC: 7.7 G/DL (ref 6.4–8.3)
SODIUM SERPL-SCNC: 140 MMOL/L (ref 135–145)
SODIUM SERPL-SCNC: 142 MMOL/L (ref 135–145)
TOTAL PROTEIN SERUM FOR ELP: 7.6 G/DL (ref 6.4–8.3)
TRIGL SERPL-MCNC: 114 MG/DL
TSH SERPL DL<=0.005 MIU/L-ACNC: 0.91 UIU/ML (ref 0.3–4.2)
VIT D+METAB SERPL-MCNC: 17 NG/ML (ref 20–50)

## 2025-02-05 PROCEDURE — 83516 IMMUNOASSAY NONANTIBODY: CPT

## 2025-02-05 PROCEDURE — 86704 HEP B CORE ANTIBODY TOTAL: CPT

## 2025-02-05 PROCEDURE — 82570 ASSAY OF URINE CREATININE: CPT

## 2025-02-05 PROCEDURE — 80069 RENAL FUNCTION PANEL: CPT

## 2025-02-05 PROCEDURE — 84165 PROTEIN E-PHORESIS SERUM: CPT | Performed by: PATHOLOGY

## 2025-02-05 PROCEDURE — 86706 HEP B SURFACE ANTIBODY: CPT

## 2025-02-05 PROCEDURE — 87389 HIV-1 AG W/HIV-1&-2 AB AG IA: CPT

## 2025-02-05 PROCEDURE — 87340 HEPATITIS B SURFACE AG IA: CPT

## 2025-02-05 PROCEDURE — 86038 ANTINUCLEAR ANTIBODIES: CPT

## 2025-02-05 PROCEDURE — 83521 IG LIGHT CHAINS FREE EACH: CPT

## 2025-02-05 PROCEDURE — 86803 HEPATITIS C AB TEST: CPT

## 2025-02-05 PROCEDURE — 99000 SPECIMEN HANDLING OFFICE-LAB: CPT

## 2025-02-05 PROCEDURE — 84155 ASSAY OF PROTEIN SERUM: CPT

## 2025-02-05 PROCEDURE — 86255 FLUORESCENT ANTIBODY SCREEN: CPT | Mod: 90

## 2025-02-05 PROCEDURE — 82306 VITAMIN D 25 HYDROXY: CPT

## 2025-02-05 PROCEDURE — 82043 UR ALBUMIN QUANTITATIVE: CPT

## 2025-02-05 PROCEDURE — 36415 COLL VENOUS BLD VENIPUNCTURE: CPT

## 2025-02-05 PROCEDURE — 86160 COMPLEMENT ANTIGEN: CPT

## 2025-02-05 PROCEDURE — 83520 IMMUNOASSAY QUANT NOS NONAB: CPT | Mod: 90

## 2025-02-05 PROCEDURE — 83876 ASSAY MYELOPEROXIDASE: CPT | Mod: 59

## 2025-02-05 RX ORDER — ROSUVASTATIN CALCIUM 20 MG/1
20 TABLET, COATED ORAL DAILY
Qty: 90 TABLET | Refills: 1 | Status: SHIPPED | OUTPATIENT
Start: 2025-02-05

## 2025-02-05 NOTE — TELEPHONE ENCOUNTER
----- Message from Aimee Jones sent at 2/5/2025 11:45 AM CST -----  Labs from my clinic apt yesterday showed   Normal electrolytes & liver tests  Glucose elevated due to DM  Creatinine is slightly up and kidney function down to 57 % similar to 6 months ago  Drink more water.  Discuss with his nephrologist for whom he got additional tests   & recheck BMP when returns for BP nurse apt as micardis dose started by cardiology increased to 80 mg yesterday.  Lipids goal , continue same dose of meds  Thyroid function is normal

## 2025-02-05 NOTE — RESULT ENCOUNTER NOTE
Labs from my clinic apt yesterday showed   Normal electrolytes & liver tests  Glucose elevated due to DM  Creatinine is slightly up and kidney function down to 57 % similar to 6 months ago  Drink more water.  Discuss with his nephrologist for whom he got additional tests   & recheck BMP when returns for BP nurse apt as micardis dose started by cardiology increased to 80 mg yesterday.  Lipids goal , continue same dose of meds  Thyroid function is normal

## 2025-02-05 NOTE — TELEPHONE ENCOUNTER
Left message to call back and ask to speak with an available nurse.    ROSE MARIE SkinnerN, RN-BC  [facility]

## 2025-02-05 NOTE — TELEPHONE ENCOUNTER
Yoanna called back. Consent to communicate on file.    Yoanna verbalized understanding and in agreement with plan.    Lab appt scheduled on 2/18/25 at 1445.  Visit date, time and location confirmed with Yoanna.    Patient already scheduled for BP check on 2/18/25 at 1500.    ROSE MARIE SkinnerN, RN-BC  [facility]

## 2025-02-05 NOTE — ADDENDUM NOTE
Addended by: BONIFACIO ARGUETA on: 2/5/2025 11:48 AM     Modules accepted: Orders    
MD Cho: 43-year-old male with left index finger paronychia and likely gout flare to right MTP joint.  Given NSAIDs, perform finger block, perform incision and drainage of paronychia.  Discharge with PCP follow-up.

## 2025-02-06 LAB
ALBUMIN SERPL ELPH-MCNC: 4 G/DL (ref 3.7–5.1)
ALPHA1 GLOB SERPL ELPH-MCNC: 0.3 G/DL (ref 0.2–0.4)
ALPHA2 GLOB SERPL ELPH-MCNC: 0.9 G/DL (ref 0.5–0.9)
ANA SER QL IF: NEGATIVE
B-GLOBULIN SERPL ELPH-MCNC: 1 G/DL (ref 0.6–1)
C3 SERPL-MCNC: 133 MG/DL (ref 81–157)
C4 SERPL-MCNC: 27 MG/DL (ref 13–39)
GAMMA GLOB SERPL ELPH-MCNC: 1.5 G/DL (ref 0.7–1.6)
KAPPA LC FREE SER-MCNC: 4.91 MG/DL (ref 0.33–1.94)
KAPPA LC FREE/LAMBDA FREE SER NEPH: 2.09 {RATIO} (ref 0.26–1.65)
LAMBDA LC FREE SERPL-MCNC: 2.35 MG/DL (ref 0.57–2.63)
LOCATION OF TASK: NORMAL
M PROTEIN SERPL ELPH-MCNC: 0 G/DL
PROT PATTERN SERPL ELPH-IMP: NORMAL

## 2025-02-07 LAB
BM IGG SER IF-ACNC: 0 AU/ML
MYELOPEROXIDASE AB SER IA-ACNC: 0.4 U/ML
MYELOPEROXIDASE AB SER IA-ACNC: NEGATIVE
PLA2R IGG SER IA-ACNC: <2 RU/ML
PROTEINASE3 AB SER IA-ACNC: <1 U/ML
PROTEINASE3 AB SER IA-ACNC: NEGATIVE

## 2025-02-26 ENCOUNTER — TELEPHONE (OUTPATIENT)
Dept: FAMILY MEDICINE | Facility: CLINIC | Age: 80
End: 2025-02-26

## 2025-02-26 NOTE — TELEPHONE ENCOUNTER
Usually we dont recommend doing UA unless having fever or symptoms given he has a chronic indwelling catheter and selina alwayss be colonized so checking a Ua when no symptoms will create a dilemma, it will show bacteria from just having a catheter in place & not necessarily be a true infection & treating when there is no infection will lead to resistance. as that will lead to resistance  If he has no fever or burning or confusion then likely no longer has an infection  He can discuss more with his urologist managing his catheter  He was admitted in er recently outside [place] and has apt with dr parekh next week  I dont see any culture result confirming he had an infection  Can you get those results ?   Please recommend daily bathing and fresh clean clothes daily and clean handling of his cath and bag daily to prevent infections  Needs to keep nails cropped short   And we may need to discuss coming off jardiance if keeps getting urinary symptoms and adjusting ozempic & metformin based on kidney function  Continue care with his nephrologist and I hope he sent his BP log to the cardiolgist to adjust his BP meds as requested by them since i last saw him  Would he like a home health referral to help with daily bathing and laundry cath care and med management. I can put that in to help prevent future infections.

## 2025-02-26 NOTE — TELEPHONE ENCOUNTER
"Relayed clinician plan of care to patient and significant other. Yoanna states, \"Well the last time this happened he didn't really have any symptoms. So she doesn't know for sure if he has an infection or not.\" Reiterated the impact of colonized growth and that testing is not recommended if patient is asymptomatic. David confirms he has no symptoms present and is in agreement with following up with urology and Dr. Hoff as scheduled.    Writer unable to locate culture results, but UA results from 2/18 pulled in from Care Everywhere.    HELEN Ellison, BSN, PHN, AMB-BC (she/her)  [facility] RN    "

## 2025-02-26 NOTE — TELEPHONE ENCOUNTER
Dr. Jones --    Please review and advise: follow-up UA due to UTI.   Pended below.     Patient requesting a follow-up UA. Patient would like this done for peace of mind that he no longer has a UTI. Patient has finished antibiotic and no symptoms.   Lab appointment scheduled for tomorrow afternoon, 11/27.   Ok to SONDRA.     Sophie Burgess, ROSE MARIEN RN  [facility]

## 2025-02-26 NOTE — TELEPHONE ENCOUNTER
Yeah the UA was not very revealing.   He was admitted with confusion and weakness and so if doesnt have those symptoms I presume he is clinically better

## 2025-03-18 ENCOUNTER — PATIENT OUTREACH (OUTPATIENT)
Dept: GERIATRIC MEDICINE | Facility: CLINIC | Age: 80
End: 2025-03-18
Payer: COMMERCIAL

## 2025-03-18 NOTE — PROGRESS NOTES
[facility] Care Coordination Contact  CC received notification of Emergency Room visit.  ER visit occurred on 3/16/25 at [facility]  with Dx of Mathews catheter malfuncation.    CC contacted significant other Yoanna  and reviewed discharge summary.  Member has a follow-up appointment with PCP: No: Offered Assistance with setting up a follow up appointment  Member has had a change in condition: No  New referrals placed: No  Home Visit Needed: No  Support Plan reviewed and updated.  PCP notified of ED visit via EMR.  Patricia Hayward RN, BSN, PHN  [facility] Care Coordinator  [phone]

## 2025-03-20 NOTE — TELEPHONE ENCOUNTER
Pls verify patient is scheduled with me vs Rache[facility]; patient requested to be scheduled with [place].  SW   .

## 2025-05-06 ENCOUNTER — OFFICE VISIT (OUTPATIENT)
Dept: FAMILY MEDICINE | Facility: CLINIC | Age: 80
End: 2025-05-06
Payer: COMMERCIAL

## 2025-05-06 ENCOUNTER — NURSE TRIAGE (OUTPATIENT)
Dept: FAMILY MEDICINE | Facility: CLINIC | Age: 80
End: 2025-05-06

## 2025-05-06 VITALS
DIASTOLIC BLOOD PRESSURE: 70 MMHG | TEMPERATURE: 97.1 F | SYSTOLIC BLOOD PRESSURE: 134 MMHG | HEART RATE: 110 BPM | OXYGEN SATURATION: 99 % | HEIGHT: 72 IN | RESPIRATION RATE: 16 BRPM | BODY MASS INDEX: 26.3 KG/M2 | WEIGHT: 194.2 LBS

## 2025-05-06 DIAGNOSIS — L98.9 SKIN LESION: Primary | ICD-10-CM

## 2025-05-06 PROCEDURE — 1126F AMNT PAIN NOTED NONE PRSNT: CPT | Performed by: FAMILY MEDICINE

## 2025-05-06 PROCEDURE — 3078F DIAST BP <80 MM HG: CPT | Performed by: FAMILY MEDICINE

## 2025-05-06 PROCEDURE — 99214 OFFICE O/P EST MOD 30 MIN: CPT | Performed by: FAMILY MEDICINE

## 2025-05-06 PROCEDURE — 3075F SYST BP GE 130 - 139MM HG: CPT | Performed by: FAMILY MEDICINE

## 2025-05-06 PROCEDURE — G2211 COMPLEX E/M VISIT ADD ON: HCPCS | Performed by: FAMILY MEDICINE

## 2025-05-06 RX ORDER — CEPHALEXIN 500 MG/1
500 CAPSULE ORAL 3 TIMES DAILY
Qty: 21 CAPSULE | Refills: 0 | Status: SHIPPED | OUTPATIENT
Start: 2025-05-06 | End: 2025-05-13

## 2025-05-06 ASSESSMENT — PAIN SCALES - GENERAL: PAINLEVEL_OUTOF10: NO PAIN (0)

## 2025-05-06 NOTE — TELEPHONE ENCOUNTER
"Yoanna ( - CTC on file), calling on behalf of patient. Yoanna put phone on speaker for RN to triage patient.  Reports patient shaved off his beard this weekend, developed a bump on his jaw line. Desires antibiotic treatment.  Lump is flesh-colored  \"Half the size of a pencil eraser.\"  Denies fever, redness, warmth, swelling.  Denies pain, itching.  Denies broken skin  Denies pus/fluid-filled.  Patient has been applying warm packs, bacitracin  Reports bump was initially larger, hard to the touch.  Bump has softened, become smaller with time.    Recommendations:  Discussed patient may not need to be seen yet - monitoring with home cares would be appropriate based on symptoms.  Patient wants to be seen.   Prefers PCP, but no appts today.  Scheduled with Dr. Garcia 5/6/25 at 1100.  Call back if new/worsening symptoms prior to appointment.    Yoanna verbalizes understanding and agreement with plan.    Josselin Russo RN  [facility]    Reason for Disposition   Patient wants to be seen    Additional Information   Negative: Sounds like a life-threatening emergency to the triager   Negative: Small growth, spot, bump, or pigmented area of skin (e.g., moles, skin tags, wart, melanoma, skin cancer)   Negative: Inguinal hernia previously diagnosed by a doctor (or NP/PA)   Negative: Followed a skin injury   Negative: Followed an insect bite and has localized swelling (e.g., small area of puffy or swollen skin)   Negative: Swelling of ankle joint   Negative: Swelling of entire face   Negative: Swelling of eyelid   Negative: Swelling of knee joint   Negative: Swelling of labia   Negative: Swelling of lymph node suspected   Negative: Swelling of rectum   Negative: Swelling of scrotum   Negative: Swelling of surgical incision   Negative: Swelling of vaccination site   Negative: Hernia suspected (bulge in groin or abdomen) and painful or vomiting   Negative: Swollen lump in groin and pulsating (like heartbeat)   " Negative: Patient sounds very sick or weak to the triager   Negative: SEVERE pain (e.g., excruciating)   Negative: Swelling is painful to touch and fever   Negative: Swelling is red and fever   Negative: Swelling is red and size > 2 inches (5.0 cm)   Negative: Swelling is painful to touch and no fever   Negative: Looks like a boil, infected sore, deep ulcer or other infected rash   Negative: Small swelling or lump present > 1 week   Negative: New-onset hernia suspected (reducible bulge in groin or abdomen; non-tender) and NO pain or vomiting    Protocols used: Skin Lump or Localized Swelling-A-[place]

## 2025-05-06 NOTE — PROGRESS NOTES
Assessment & Plan     Skin lesion  Appears more like a cyst but other etiology including infection, mass cannot be ruled out.  He has had a good luck in the past with antibiotics and I will try Keflex and see if it helps.  If it does not improve for better characteristics I would recommend doing soft tissue ultrasound and they agreed.  When to seek follow-up care discussed.  They understood.  - cephALEXin (KEFLEX) 500 MG capsule; Take 1 capsule (500 mg) by mouth 3 times daily for 7 days.  - US Head Neck Soft Tissue; Future     The longitudinal plan of care for the diagnosis(es)/condition(s) as documented were addressed during this visit. Due to the added complexity in care, I will continue to support David in the subsequent management and with ongoing continuity of care.    Genia Hernandez is a 79 year old, presenting for the following health issues:  lump on the right side of chin ( X 3 weeks)        5/6/2025    10:43 AM   Additional Questions   Roomed by Clement   Accompanied by Self     HPI      2 weeks now. Had beard for a long time and now shaved and noticed bump when shaved.   No change in size.   Using wash cloth, bacitracin and hydrogen peroxide.   No fever.   A while ago similar issue and antibiotics helped.         Objective    /70 (BP Location: Right arm, Patient Position: Sitting, Cuff Size: Adult Regular)   Pulse 110   Temp 97.1  F (36.2  C) (Temporal)   Resp 16   Ht 1.829 m (6')   Wt 88.1 kg (194 lb 3.2 oz)   SpO2 99%   BMI 26.34 kg/m    Body mass index is 26.34 kg/m .  Physical Exam   Right jaw around 5 cm x 3 cm nodular lesion without any fluctuation or tenderness. No any palpable gum lesion or abscess. No other cervical adenopathy.             Signed Electronically by: Jonathan Garcia MD, MD

## 2025-05-20 ENCOUNTER — TELEPHONE (OUTPATIENT)
Dept: FAMILY MEDICINE | Facility: CLINIC | Age: 80
End: 2025-05-20
Payer: COMMERCIAL

## 2025-05-20 NOTE — TELEPHONE ENCOUNTER
Call back received from SARA Lenz on file  She shares fax number [phone]  Faxed imaging order w/ face sheet  Copy kept in clinic

## 2025-05-20 NOTE — TELEPHONE ENCOUNTER
Yoanna (CTC on file) called. Reported pt saw Dr. Garcia about 3 weeks ago and Dr. Garcia ordered a U/S head neck soft tissue for pt. [facility] called them to schedule an appt for imaging, but they said it's too far for them. They preferred seeing [facility], so the [facility] [place] staff informed them that they would need a different/new order from the provider to get seen at [facility]. Writer advised Yoanna to call [facility] imaging center for the fax number and the clinic can fax the U/S order over.    Hao Winston, BSN, PHN, RN-[facility]

## 2025-05-21 ENCOUNTER — TELEPHONE (OUTPATIENT)
Dept: FAMILY MEDICINE | Facility: CLINIC | Age: 80
End: 2025-05-21
Payer: COMMERCIAL

## 2025-05-21 NOTE — TELEPHONE ENCOUNTER
General Call    Contacts       Contact Date/Time Type Contact Phone/Fax    05/21/2025 02:55 PM CDT Phone (Incoming) David Gutierrez Jr. (Self) [phone] (H)          Reason for Call:  Refill for Keflex?    What are your questions or concerns:      Patient saw Dr. Garcia on 5/06/2025 for a skin/cycst due to shaving. Patient was prescribed a Keflex and finished the full course but the bump has not completely gone away.    Yoanna, patient's significant other (CTC on file) calling on behalf of patient asking if Dr. Garcia would be willing to refill the Keflex for patient so that his symptoms will disappear?    Patient does have a follow-up visit scheduled for:    Jun 17, 2025 9:30 AM  (Arrive by 9:10 AM)  Provider Visit with Jonathan Garcia MD  [facility] ([facility] ) [phone]       Date of last appointment with provider: 5/06/2025    Okay to leave a detailed message?: Yes at Home number on file [phone] (home)    Routing message to Dr. Garcia to review and advise.    Anne Marie Arcos, ROSE MARIEN, RN, PHN   [facility]

## 2025-05-21 NOTE — TELEPHONE ENCOUNTER
Reviewed with PT and Yoanna.  Made appt for 5/23/2 for the clinic appt.  Helena Pimentel RN  [facility]/ [phone]

## 2025-05-21 NOTE — TELEPHONE ENCOUNTER
Friend, Yoanna RUIZ, called regarding imaging order faxed over.     Apparently, they did not receive.     Please reFAX.     Thank you.   ROSE MARIE ArellanoN RN  [facility]

## 2025-05-21 NOTE — TELEPHONE ENCOUNTER
Recurrent use of antibiotics are not appropriate without evaluation. Would recommend in person evaluation. He did not have typical infection last time and I am not entirely sure if this is the right treatment despite it working for him.

## 2025-07-09 ENCOUNTER — TELEPHONE (OUTPATIENT)
Dept: CARDIOLOGY | Facility: CLINIC | Age: 80
End: 2025-07-09
Payer: COMMERCIAL

## 2025-07-09 DIAGNOSIS — I10 HYPERTENSION GOAL BP (BLOOD PRESSURE) < 140/90: ICD-10-CM

## 2025-07-09 RX ORDER — TELMISARTAN 80 MG/1
80 TABLET ORAL DAILY
Qty: 90 TABLET | Refills: 3 | Status: SHIPPED | OUTPATIENT
Start: 2025-07-09

## 2025-07-09 NOTE — TELEPHONE ENCOUNTER
[facility] Call Center    Phone Message    May a detailed message be left on voicemail: yes     Reason for Call: Medication Refill Request    Has the patient contacted the pharmacy for the refill? Yes   Name of medication being requested: telmisartan 80mg  Provider who prescribed the medication: González  Pharmacy: [facility] - [place], [place] - [address]    Date medication is needed: asap    BP readings are doing great since new meds. Its been in the 130s over 70       Action Taken: Other: cardio    Travel Screening: Not Applicable     Date of Service:

## 2025-07-09 NOTE — TELEPHONE ENCOUNTER
Noted Rx was sent in back in January for 90 with 3 refills. Rx sent in for refill nonetheless. [place] [place]

## 2025-07-23 ENCOUNTER — PATIENT OUTREACH (OUTPATIENT)
Dept: GERIATRIC MEDICINE | Facility: CLINIC | Age: 80
End: 2025-07-23
Payer: COMMERCIAL

## 2025-07-23 NOTE — PROGRESS NOTES
[facility] Care Coordination Contact      [facility] Six-Month Telephone Assessment    6 month telephone assessment completed on 7/23/25 with David's BERHANE Lenz.    ER visits: Yes -  [facility]   Hospitalizations: No  TCU stays: No  Significant health status changes: None  Falls/Injuries: No  ADL/IADL changes: No  Changes in services: No    Caregiver Assessment follow up:  NA    Goals: See Support Plan for goal progress documentation.      Yoanna reports that member is doing well. He has a follow up with nephrology coming up. Member is doing well with the catheter. Member's sister recently passed away. Yoanna and member attend [employer] at [facility] in [place]. They are going to [employer] tomorrow and will have coffee afterwards.  Discussed [employer] card. They are taking advantage of all the supplemental benefits (groceries, OOP, Utilities, etc).    Will call member in 6 months for an annual health risk assessment.   Encouraged member to call CC with any questions or concerns in the meantime.    Patricia Hayward RN, BSN, PHN  [facility] Care Coordinator  [phone]

## 2025-07-23 NOTE — LETTER
July 24, 2025    ANDRADE ARANDA JR.  [address] [place] [address]    Dear Andrade:     I m your care coordinator. I ve been unable to reach you by phone. I am writing to ask you or your authorized representative to call me at [phone]. If you reach my voicemail, leave a message with your daytime phone number. Include a date and time that I can call you. If you are hearing impaired, call the [place] Relay at 513 or [phone] ([id number] relay service).    The reason I am trying to reach you is:     [] To schedule an assessment  [x] For your six (6)-month check-in  [] Other:      Please call me as soon as you receive this letter. I look forward to speaking with you.    Sincerely,    Patricia Hayward RN, BSN, PHN    E-mail: [email or web address]  Phone: [phone]      [employer]                                                                    <[id number]>    (08/2024)

## 2025-07-24 NOTE — PROGRESS NOTES
"[facility] Care Coordination Contact    Per CC, mailed client an \"Unable to Contact\" letter.    Karolyn Salas  Care Management Specialist  [facility]  [phone]          "

## 2025-07-29 ENCOUNTER — LAB (OUTPATIENT)
Dept: LAB | Facility: CLINIC | Age: 80
End: 2025-07-29
Payer: COMMERCIAL

## 2025-07-29 DIAGNOSIS — E11.29 MICROALBUMINURIA DUE TO TYPE 2 DIABETES MELLITUS (H): ICD-10-CM

## 2025-07-29 DIAGNOSIS — N18.2 CHRONIC KIDNEY DISEASE (CKD), STAGE II (MILD): ICD-10-CM

## 2025-07-29 DIAGNOSIS — R80.9 MICROALBUMINURIA DUE TO TYPE 2 DIABETES MELLITUS (H): ICD-10-CM

## 2025-07-29 LAB
ANION GAP SERPL CALCULATED.3IONS-SCNC: 11 MMOL/L (ref 7–15)
BUN SERPL-MCNC: 16.4 MG/DL (ref 8–23)
CALCIUM SERPL-MCNC: 9.3 MG/DL (ref 8.8–10.4)
CHLORIDE SERPL-SCNC: 103 MMOL/L (ref 98–107)
CREAT SERPL-MCNC: 1.32 MG/DL (ref 0.67–1.17)
CREAT UR-MCNC: 73.4 MG/DL
EGFRCR SERPLBLD CKD-EPI 2021: 55 ML/MIN/1.73M2
EST. AVERAGE GLUCOSE BLD GHB EST-MCNC: 154 MG/DL
GLUCOSE SERPL-MCNC: 139 MG/DL (ref 70–99)
HBA1C MFR BLD: 7 % (ref 0–5.6)
HCO3 SERPL-SCNC: 25 MMOL/L (ref 22–29)
MICROALBUMIN UR-MCNC: 273 MG/L
MICROALBUMIN/CREAT UR: 371.93 MG/G CR (ref 0–17)
POTASSIUM SERPL-SCNC: 4.3 MMOL/L (ref 3.4–5.3)
SODIUM SERPL-SCNC: 139 MMOL/L (ref 135–145)

## 2025-07-29 PROCEDURE — 82570 ASSAY OF URINE CREATININE: CPT

## 2025-07-29 PROCEDURE — 80048 BASIC METABOLIC PNL TOTAL CA: CPT

## 2025-07-29 PROCEDURE — 82043 UR ALBUMIN QUANTITATIVE: CPT

## 2025-07-29 PROCEDURE — 36415 COLL VENOUS BLD VENIPUNCTURE: CPT

## 2025-07-29 PROCEDURE — 83036 HEMOGLOBIN GLYCOSYLATED A1C: CPT

## 2025-08-08 DIAGNOSIS — E78.5 HYPERLIPIDEMIA WITH TARGET LDL LESS THAN 100: ICD-10-CM

## 2025-08-09 RX ORDER — ROSUVASTATIN CALCIUM 20 MG/1
20 TABLET, COATED ORAL DAILY
Qty: 90 TABLET | Refills: 2 | Status: SHIPPED | OUTPATIENT
Start: 2025-08-09

## 2025-08-27 ENCOUNTER — TELEPHONE (OUTPATIENT)
Dept: FAMILY MEDICINE | Facility: CLINIC | Age: 80
End: 2025-08-27
Payer: COMMERCIAL